# Patient Record
Sex: FEMALE | Race: WHITE | Employment: OTHER | URBAN - METROPOLITAN AREA
[De-identification: names, ages, dates, MRNs, and addresses within clinical notes are randomized per-mention and may not be internally consistent; named-entity substitution may affect disease eponyms.]

---

## 2017-04-10 ENCOUNTER — OFFICE VISIT (OUTPATIENT)
Dept: INTERNAL MEDICINE CLINIC | Age: 77
End: 2017-04-10

## 2017-04-10 VITALS
DIASTOLIC BLOOD PRESSURE: 69 MMHG | BODY MASS INDEX: 37.56 KG/M2 | WEIGHT: 220 LBS | OXYGEN SATURATION: 94 % | HEART RATE: 77 BPM | SYSTOLIC BLOOD PRESSURE: 132 MMHG | TEMPERATURE: 97.6 F | RESPIRATION RATE: 16 BRPM | HEIGHT: 64 IN

## 2017-04-10 DIAGNOSIS — R05.9 COUGH: ICD-10-CM

## 2017-04-10 DIAGNOSIS — J01.00 ACUTE NON-RECURRENT MAXILLARY SINUSITIS: Primary | ICD-10-CM

## 2017-04-10 PROCEDURE — 4040F PNEUMOC VAC/ADMIN/RCVD: CPT | Performed by: NURSE PRACTITIONER

## 2017-04-10 PROCEDURE — G8417 CALC BMI ABV UP PARAM F/U: HCPCS | Performed by: NURSE PRACTITIONER

## 2017-04-10 PROCEDURE — 1123F ACP DISCUSS/DSCN MKR DOCD: CPT | Performed by: NURSE PRACTITIONER

## 2017-04-10 PROCEDURE — 99213 OFFICE O/P EST LOW 20 MIN: CPT | Performed by: NURSE PRACTITIONER

## 2017-04-10 PROCEDURE — 1036F TOBACCO NON-USER: CPT | Performed by: NURSE PRACTITIONER

## 2017-04-10 PROCEDURE — G8427 DOCREV CUR MEDS BY ELIG CLIN: HCPCS | Performed by: NURSE PRACTITIONER

## 2017-04-10 PROCEDURE — 1090F PRES/ABSN URINE INCON ASSESS: CPT | Performed by: NURSE PRACTITIONER

## 2017-04-10 PROCEDURE — G8400 PT W/DXA NO RESULTS DOC: HCPCS | Performed by: NURSE PRACTITIONER

## 2017-04-10 RX ORDER — BENZONATATE 100 MG/1
100 CAPSULE ORAL 3 TIMES DAILY PRN
Qty: 24 CAPSULE | Refills: 1 | Status: SHIPPED | OUTPATIENT
Start: 2017-04-10 | End: 2017-04-18

## 2017-04-10 RX ORDER — AMOXICILLIN AND CLAVULANATE POTASSIUM 875; 125 MG/1; MG/1
1 TABLET, FILM COATED ORAL 2 TIMES DAILY
Qty: 20 TABLET | Refills: 0 | Status: SHIPPED | OUTPATIENT
Start: 2017-04-10 | End: 2017-04-20

## 2017-04-10 ASSESSMENT — ENCOUNTER SYMPTOMS
NAUSEA: 0
WHEEZING: 0
DIARRHEA: 0
COUGH: 1
SINUS PRESSURE: 1
VOMITING: 1

## 2017-04-24 ENCOUNTER — TELEPHONE (OUTPATIENT)
Dept: FAMILY MEDICINE CLINIC | Age: 77
End: 2017-04-24

## 2017-04-24 DIAGNOSIS — J32.9 CHRONIC SINUSITIS, UNSPECIFIED LOCATION: Primary | ICD-10-CM

## 2017-04-24 RX ORDER — TIZANIDINE 4 MG/1
4 TABLET ORAL EVERY 6 HOURS PRN
Qty: 20 TABLET | Refills: 0 | Status: SHIPPED | OUTPATIENT
Start: 2017-04-24 | End: 2017-05-02 | Stop reason: SDUPTHER

## 2017-04-24 RX ORDER — PREDNISONE 20 MG/1
20 TABLET ORAL DAILY
Qty: 5 TABLET | Refills: 0 | Status: SHIPPED | OUTPATIENT
Start: 2017-04-24 | End: 2017-04-29

## 2017-05-03 RX ORDER — TIZANIDINE 4 MG/1
TABLET ORAL
Qty: 20 TABLET | Refills: 0 | Status: SHIPPED | OUTPATIENT
Start: 2017-05-03 | End: 2017-05-05 | Stop reason: SDUPTHER

## 2017-05-06 RX ORDER — PROPRANOLOL HYDROCHLORIDE 120 MG/1
CAPSULE, EXTENDED RELEASE ORAL
Qty: 90 CAPSULE | Refills: 0 | Status: SHIPPED | OUTPATIENT
Start: 2017-05-06 | End: 2017-08-03 | Stop reason: SDUPTHER

## 2017-05-06 RX ORDER — LISINOPRIL AND HYDROCHLOROTHIAZIDE 12.5; 1 MG/1; MG/1
TABLET ORAL
Qty: 90 TABLET | Refills: 0 | Status: SHIPPED | OUTPATIENT
Start: 2017-05-06 | End: 2017-08-03 | Stop reason: SDUPTHER

## 2017-05-06 RX ORDER — TIZANIDINE 4 MG/1
TABLET ORAL
Qty: 20 TABLET | Refills: 0 | Status: SHIPPED | OUTPATIENT
Start: 2017-05-06 | End: 2017-05-08 | Stop reason: SDUPTHER

## 2017-05-08 RX ORDER — TIZANIDINE 4 MG/1
4 TABLET ORAL EVERY 6 HOURS PRN
Qty: 20 TABLET | Refills: 0 | Status: SHIPPED | OUTPATIENT
Start: 2017-05-08 | End: 2017-05-16 | Stop reason: SDUPTHER

## 2017-05-17 RX ORDER — TIZANIDINE 4 MG/1
TABLET ORAL
Qty: 20 TABLET | Refills: 0 | Status: SHIPPED | OUTPATIENT
Start: 2017-05-17 | End: 2017-08-22

## 2017-05-22 ENCOUNTER — OFFICE VISIT (OUTPATIENT)
Dept: FAMILY MEDICINE CLINIC | Age: 77
End: 2017-05-22

## 2017-05-22 ENCOUNTER — TELEPHONE (OUTPATIENT)
Dept: FAMILY MEDICINE CLINIC | Age: 77
End: 2017-05-22

## 2017-05-22 VITALS
RESPIRATION RATE: 18 BRPM | HEART RATE: 76 BPM | BODY MASS INDEX: 36.37 KG/M2 | SYSTOLIC BLOOD PRESSURE: 116 MMHG | TEMPERATURE: 97.7 F | DIASTOLIC BLOOD PRESSURE: 70 MMHG | WEIGHT: 213 LBS | HEIGHT: 64 IN

## 2017-05-22 DIAGNOSIS — Z00.00 MEDICARE ANNUAL WELLNESS VISIT, SUBSEQUENT: Primary | ICD-10-CM

## 2017-05-22 DIAGNOSIS — L98.9 SKIN LESION OF FACE: ICD-10-CM

## 2017-05-22 DIAGNOSIS — I48.0 PAROXYSMAL A-FIB (HCC): ICD-10-CM

## 2017-05-22 DIAGNOSIS — E78.2 MIXED HYPERLIPIDEMIA: ICD-10-CM

## 2017-05-22 DIAGNOSIS — R73.9 HYPERGLYCEMIA: ICD-10-CM

## 2017-05-22 DIAGNOSIS — K21.9 GASTROESOPHAGEAL REFLUX DISEASE WITHOUT ESOPHAGITIS: ICD-10-CM

## 2017-05-22 DIAGNOSIS — J30.89 PERENNIAL ALLERGIC RHINITIS, UNSPECIFIED ALLERGIC RHINITIS TRIGGER: ICD-10-CM

## 2017-05-22 DIAGNOSIS — G25.0 BENIGN ESSENTIAL TREMOR: ICD-10-CM

## 2017-05-22 DIAGNOSIS — Z23 NEED FOR PROPHYLACTIC VACCINATION AGAINST STREPTOCOCCUS PNEUMONIAE (PNEUMOCOCCUS): ICD-10-CM

## 2017-05-22 DIAGNOSIS — I10 ESSENTIAL HYPERTENSION: ICD-10-CM

## 2017-05-22 PROBLEM — C44.310 BCC (BASAL CELL CARCINOMA), FACE: Status: ACTIVE | Noted: 2017-05-22

## 2017-05-22 PROBLEM — C44.310 BCC (BASAL CELL CARCINOMA), FACE: Status: RESOLVED | Noted: 2017-05-22 | Resolved: 2017-05-22

## 2017-05-22 PROCEDURE — 90670 PCV13 VACCINE IM: CPT | Performed by: FAMILY MEDICINE

## 2017-05-22 PROCEDURE — G8417 CALC BMI ABV UP PARAM F/U: HCPCS | Performed by: FAMILY MEDICINE

## 2017-05-22 PROCEDURE — G8400 PT W/DXA NO RESULTS DOC: HCPCS | Performed by: FAMILY MEDICINE

## 2017-05-22 PROCEDURE — 1090F PRES/ABSN URINE INCON ASSESS: CPT | Performed by: FAMILY MEDICINE

## 2017-05-22 PROCEDURE — G0439 PPPS, SUBSEQ VISIT: HCPCS | Performed by: FAMILY MEDICINE

## 2017-05-22 PROCEDURE — G0009 ADMIN PNEUMOCOCCAL VACCINE: HCPCS | Performed by: FAMILY MEDICINE

## 2017-05-22 PROCEDURE — 1123F ACP DISCUSS/DSCN MKR DOCD: CPT | Performed by: FAMILY MEDICINE

## 2017-05-22 PROCEDURE — 4040F PNEUMOC VAC/ADMIN/RCVD: CPT | Performed by: FAMILY MEDICINE

## 2017-05-22 PROCEDURE — 99214 OFFICE O/P EST MOD 30 MIN: CPT | Performed by: FAMILY MEDICINE

## 2017-05-22 PROCEDURE — G8427 DOCREV CUR MEDS BY ELIG CLIN: HCPCS | Performed by: FAMILY MEDICINE

## 2017-05-22 PROCEDURE — 1036F TOBACCO NON-USER: CPT | Performed by: FAMILY MEDICINE

## 2017-05-22 RX ORDER — LORATADINE 10 MG/1
10 TABLET ORAL DAILY
Qty: 90 TABLET | Refills: 3 | Status: SHIPPED | OUTPATIENT
Start: 2017-05-22 | End: 2019-03-12

## 2017-05-22 RX ORDER — FLUTICASONE PROPIONATE 50 MCG
1 SPRAY, SUSPENSION (ML) NASAL DAILY
Qty: 1 BOTTLE | Refills: 5 | Status: SHIPPED | OUTPATIENT
Start: 2017-05-22 | End: 2021-09-16

## 2017-05-22 ASSESSMENT — LIFESTYLE VARIABLES
HOW OFTEN DURING THE LAST YEAR HAVE YOU NEEDED AN ALCOHOLIC DRINK FIRST THING IN THE MORNING TO GET YOURSELF GOING AFTER A NIGHT OF HEAVY DRINKING: 0
AUDIT TOTAL SCORE: 3
HOW OFTEN DO YOU HAVE A DRINK CONTAINING ALCOHOL: 2
HOW MANY STANDARD DRINKS CONTAINING ALCOHOL DO YOU HAVE ON A TYPICAL DAY: 0
HAS A RELATIVE, FRIEND, DOCTOR, OR ANOTHER HEALTH PROFESSIONAL EXPRESSED CONCERN ABOUT YOUR DRINKING OR SUGGESTED YOU CUT DOWN: 0
HOW OFTEN DURING THE LAST YEAR HAVE YOU BEEN UNABLE TO REMEMBER WHAT HAPPENED THE NIGHT BEFORE BECAUSE YOU HAD BEEN DRINKING: 0
HAVE YOU OR SOMEONE ELSE BEEN INJURED AS A RESULT OF YOUR DRINKING: 0
HOW OFTEN DURING THE LAST YEAR HAVE YOU FOUND THAT YOU WERE NOT ABLE TO STOP DRINKING ONCE YOU HAD STARTED: 0
HOW OFTEN DURING THE LAST YEAR HAVE YOU FAILED TO DO WHAT WAS NORMALLY EXPECTED FROM YOU BECAUSE OF DRINKING: 0
AUDIT-C TOTAL SCORE: 3
HOW OFTEN DO YOU HAVE SIX OR MORE DRINKS ON ONE OCCASION: 1
HOW OFTEN DURING THE LAST YEAR HAVE YOU HAD A FEELING OF GUILT OR REMORSE AFTER DRINKING: 0

## 2017-05-22 ASSESSMENT — PATIENT HEALTH QUESTIONNAIRE - PHQ9: SUM OF ALL RESPONSES TO PHQ QUESTIONS 1-9: 0

## 2017-05-22 ASSESSMENT — ANXIETY QUESTIONNAIRES: GAD7 TOTAL SCORE: 0

## 2017-05-23 DIAGNOSIS — R73.9 HYPERGLYCEMIA: ICD-10-CM

## 2017-05-23 DIAGNOSIS — E78.2 MIXED HYPERLIPIDEMIA: ICD-10-CM

## 2017-05-23 DIAGNOSIS — R73.03 PREDIABETES: ICD-10-CM

## 2017-05-23 DIAGNOSIS — I10 ESSENTIAL HYPERTENSION: ICD-10-CM

## 2017-05-24 ENCOUNTER — PATIENT MESSAGE (OUTPATIENT)
Dept: FAMILY MEDICINE CLINIC | Age: 77
End: 2017-05-24

## 2017-05-24 DIAGNOSIS — R42 VERTIGO: ICD-10-CM

## 2017-05-24 PROBLEM — R73.03 PREDIABETES: Status: ACTIVE | Noted: 2017-05-24

## 2017-05-24 LAB
A/G RATIO: 1.4 (ref 1.1–2.2)
ALBUMIN SERPL-MCNC: 3.9 G/DL (ref 3.4–5)
ALP BLD-CCNC: 91 U/L (ref 40–129)
ALT SERPL-CCNC: 14 U/L (ref 10–40)
ANION GAP SERPL CALCULATED.3IONS-SCNC: 17 MMOL/L (ref 3–16)
AST SERPL-CCNC: 15 U/L (ref 15–37)
BILIRUB SERPL-MCNC: <0.2 MG/DL (ref 0–1)
BUN BLDV-MCNC: 14 MG/DL (ref 7–20)
CALCIUM SERPL-MCNC: 9.1 MG/DL (ref 8.3–10.6)
CHLORIDE BLD-SCNC: 101 MMOL/L (ref 99–110)
CHOLESTEROL, TOTAL: 173 MG/DL (ref 0–199)
CO2: 24 MMOL/L (ref 21–32)
CREAT SERPL-MCNC: 0.7 MG/DL (ref 0.6–1.2)
ESTIMATED AVERAGE GLUCOSE: 119.8 MG/DL
GFR AFRICAN AMERICAN: >60
GFR NON-AFRICAN AMERICAN: >60
GLOBULIN: 2.8 G/DL
GLUCOSE BLD-MCNC: 112 MG/DL (ref 70–99)
HBA1C MFR BLD: 5.8 %
HDLC SERPL-MCNC: 38 MG/DL (ref 40–60)
LDL CHOLESTEROL CALCULATED: 96 MG/DL
POTASSIUM SERPL-SCNC: 4.6 MMOL/L (ref 3.5–5.1)
SODIUM BLD-SCNC: 142 MMOL/L (ref 136–145)
TOTAL PROTEIN: 6.7 G/DL (ref 6.4–8.2)
TRIGL SERPL-MCNC: 197 MG/DL (ref 0–150)
VLDLC SERPL CALC-MCNC: 39 MG/DL

## 2017-05-24 RX ORDER — MECLIZINE HYDROCHLORIDE 25 MG/1
25 TABLET ORAL 3 TIMES DAILY PRN
Qty: 30 TABLET | Refills: 1 | Status: SHIPPED | OUTPATIENT
Start: 2017-05-24 | End: 2019-05-30 | Stop reason: SDUPTHER

## 2017-05-31 ENCOUNTER — OFFICE VISIT (OUTPATIENT)
Dept: SURGERY | Age: 77
End: 2017-05-31

## 2017-05-31 DIAGNOSIS — D48.5 NEOPLASM OF UNCERTAIN BEHAVIOR OF SKIN: Primary | ICD-10-CM

## 2017-05-31 PROCEDURE — 1036F TOBACCO NON-USER: CPT | Performed by: DERMATOLOGY

## 2017-05-31 PROCEDURE — 1090F PRES/ABSN URINE INCON ASSESS: CPT | Performed by: DERMATOLOGY

## 2017-05-31 PROCEDURE — G8427 DOCREV CUR MEDS BY ELIG CLIN: HCPCS | Performed by: DERMATOLOGY

## 2017-05-31 PROCEDURE — 99202 OFFICE O/P NEW SF 15 MIN: CPT | Performed by: DERMATOLOGY

## 2017-05-31 PROCEDURE — 11101 PR BIOPSY, EACH ADDED LESION: CPT | Performed by: DERMATOLOGY

## 2017-05-31 PROCEDURE — 4040F PNEUMOC VAC/ADMIN/RCVD: CPT | Performed by: DERMATOLOGY

## 2017-05-31 PROCEDURE — G8400 PT W/DXA NO RESULTS DOC: HCPCS | Performed by: DERMATOLOGY

## 2017-05-31 PROCEDURE — 1123F ACP DISCUSS/DSCN MKR DOCD: CPT | Performed by: DERMATOLOGY

## 2017-05-31 PROCEDURE — G8417 CALC BMI ABV UP PARAM F/U: HCPCS | Performed by: DERMATOLOGY

## 2017-05-31 PROCEDURE — 11100 PR BIOPSY OF SKIN LESION: CPT | Performed by: DERMATOLOGY

## 2017-06-05 ENCOUNTER — TELEPHONE (OUTPATIENT)
Dept: SURGERY | Age: 77
End: 2017-06-05

## 2017-06-07 ENCOUNTER — PROCEDURE VISIT (OUTPATIENT)
Dept: SURGERY | Age: 77
End: 2017-06-07

## 2017-06-07 VITALS
DIASTOLIC BLOOD PRESSURE: 80 MMHG | BODY MASS INDEX: 37.25 KG/M2 | HEART RATE: 61 BPM | OXYGEN SATURATION: 93 % | TEMPERATURE: 97.4 F | SYSTOLIC BLOOD PRESSURE: 128 MMHG | WEIGHT: 217 LBS

## 2017-06-07 DIAGNOSIS — C44.311 BASAL CELL CARCINOMA OF RIGHT NASAL SIDEWALL: Primary | ICD-10-CM

## 2017-06-07 PROCEDURE — 13151 CMPLX RPR E/N/E/L 1.1-2.5 CM: CPT | Performed by: DERMATOLOGY

## 2017-06-07 PROCEDURE — 17311 MOHS 1 STAGE H/N/HF/G: CPT | Performed by: DERMATOLOGY

## 2017-06-07 PROCEDURE — 17312 MOHS ADDL STAGE: CPT | Performed by: DERMATOLOGY

## 2017-06-08 ENCOUNTER — TELEPHONE (OUTPATIENT)
Dept: SURGERY | Age: 77
End: 2017-06-08

## 2017-06-14 ENCOUNTER — PROCEDURE VISIT (OUTPATIENT)
Dept: SURGERY | Age: 77
End: 2017-06-14

## 2017-06-14 VITALS
HEART RATE: 74 BPM | TEMPERATURE: 97.5 F | WEIGHT: 213 LBS | BODY MASS INDEX: 36.56 KG/M2 | OXYGEN SATURATION: 96 % | SYSTOLIC BLOOD PRESSURE: 138 MMHG | DIASTOLIC BLOOD PRESSURE: 82 MMHG

## 2017-06-14 DIAGNOSIS — C44.319 BASAL CELL CARCINOMA OF RIGHT MEDIAL CHEEK: Primary | ICD-10-CM

## 2017-06-14 DIAGNOSIS — Z48.02 VISIT FOR SUTURE REMOVAL: ICD-10-CM

## 2017-06-14 PROCEDURE — 13132 CMPLX RPR F/C/C/M/N/AX/G/H/F: CPT | Performed by: DERMATOLOGY

## 2017-06-14 PROCEDURE — 17312 MOHS ADDL STAGE: CPT | Performed by: DERMATOLOGY

## 2017-06-14 PROCEDURE — 17311 MOHS 1 STAGE H/N/HF/G: CPT | Performed by: DERMATOLOGY

## 2017-06-15 ENCOUNTER — TELEPHONE (OUTPATIENT)
Dept: SURGERY | Age: 77
End: 2017-06-15

## 2017-08-03 RX ORDER — PROPRANOLOL HYDROCHLORIDE 120 MG/1
CAPSULE, EXTENDED RELEASE ORAL
Qty: 90 CAPSULE | Refills: 1 | Status: SHIPPED | OUTPATIENT
Start: 2017-08-03 | End: 2018-02-01 | Stop reason: SDUPTHER

## 2017-08-03 RX ORDER — LISINOPRIL AND HYDROCHLOROTHIAZIDE 12.5; 1 MG/1; MG/1
TABLET ORAL
Qty: 90 TABLET | Refills: 1 | Status: SHIPPED | OUTPATIENT
Start: 2017-08-03 | End: 2018-02-01 | Stop reason: SDUPTHER

## 2017-08-22 RX ORDER — TIZANIDINE 4 MG/1
TABLET ORAL
Qty: 20 TABLET | Refills: 0 | Status: SHIPPED | OUTPATIENT
Start: 2017-08-22 | End: 2018-05-14 | Stop reason: SDUPTHER

## 2017-09-26 RX ORDER — ATORVASTATIN CALCIUM 20 MG/1
TABLET, FILM COATED ORAL
Qty: 90 TABLET | Refills: 0 | Status: SHIPPED | OUTPATIENT
Start: 2017-09-26 | End: 2018-08-06

## 2017-11-22 ENCOUNTER — OFFICE VISIT (OUTPATIENT)
Dept: FAMILY MEDICINE CLINIC | Age: 77
End: 2017-11-22

## 2017-11-22 VITALS
BODY MASS INDEX: 38.27 KG/M2 | TEMPERATURE: 97.8 F | HEART RATE: 68 BPM | RESPIRATION RATE: 18 BRPM | DIASTOLIC BLOOD PRESSURE: 70 MMHG | WEIGHT: 216 LBS | HEIGHT: 63 IN | SYSTOLIC BLOOD PRESSURE: 124 MMHG

## 2017-11-22 DIAGNOSIS — N39.41 URGE INCONTINENCE: ICD-10-CM

## 2017-11-22 DIAGNOSIS — I25.10 CHRONIC CORONARY ARTERY DISEASE: ICD-10-CM

## 2017-11-22 DIAGNOSIS — R30.0 DYSURIA: ICD-10-CM

## 2017-11-22 DIAGNOSIS — Z23 NEEDS FLU SHOT: ICD-10-CM

## 2017-11-22 DIAGNOSIS — E78.2 MIXED HYPERLIPIDEMIA: ICD-10-CM

## 2017-11-22 DIAGNOSIS — I10 ESSENTIAL HYPERTENSION: Primary | ICD-10-CM

## 2017-11-22 DIAGNOSIS — N30.00 ACUTE CYSTITIS WITHOUT HEMATURIA: ICD-10-CM

## 2017-11-22 DIAGNOSIS — I48.0 PAROXYSMAL A-FIB (HCC): ICD-10-CM

## 2017-11-22 DIAGNOSIS — R73.03 PREDIABETES: ICD-10-CM

## 2017-11-22 LAB
BILIRUBIN, POC: NEGATIVE
BLOOD URINE, POC: ABNORMAL
CLARITY, POC: CLEAR
COLOR, POC: ABNORMAL
GLUCOSE URINE, POC: NEGATIVE
KETONES, POC: NEGATIVE
LEUKOCYTE EST, POC: ABNORMAL
NITRITE, POC: NEGATIVE
PH, POC: 5.5
PROTEIN, POC: ABNORMAL
SPECIFIC GRAVITY, POC: 1.02
UROBILINOGEN, POC: ABNORMAL

## 2017-11-22 PROCEDURE — 0509F URINE INCON PLAN DOCD: CPT | Performed by: FAMILY MEDICINE

## 2017-11-22 PROCEDURE — G8400 PT W/DXA NO RESULTS DOC: HCPCS | Performed by: FAMILY MEDICINE

## 2017-11-22 PROCEDURE — 1123F ACP DISCUSS/DSCN MKR DOCD: CPT | Performed by: FAMILY MEDICINE

## 2017-11-22 PROCEDURE — G0008 ADMIN INFLUENZA VIRUS VAC: HCPCS | Performed by: FAMILY MEDICINE

## 2017-11-22 PROCEDURE — G8484 FLU IMMUNIZE NO ADMIN: HCPCS | Performed by: FAMILY MEDICINE

## 2017-11-22 PROCEDURE — 99214 OFFICE O/P EST MOD 30 MIN: CPT | Performed by: FAMILY MEDICINE

## 2017-11-22 PROCEDURE — 90662 IIV NO PRSV INCREASED AG IM: CPT | Performed by: FAMILY MEDICINE

## 2017-11-22 PROCEDURE — 81002 URINALYSIS NONAUTO W/O SCOPE: CPT | Performed by: FAMILY MEDICINE

## 2017-11-22 PROCEDURE — 1036F TOBACCO NON-USER: CPT | Performed by: FAMILY MEDICINE

## 2017-11-22 PROCEDURE — 1090F PRES/ABSN URINE INCON ASSESS: CPT | Performed by: FAMILY MEDICINE

## 2017-11-22 PROCEDURE — G8427 DOCREV CUR MEDS BY ELIG CLIN: HCPCS | Performed by: FAMILY MEDICINE

## 2017-11-22 PROCEDURE — 4040F PNEUMOC VAC/ADMIN/RCVD: CPT | Performed by: FAMILY MEDICINE

## 2017-11-22 PROCEDURE — G8599 NO ASA/ANTIPLAT THER USE RNG: HCPCS | Performed by: FAMILY MEDICINE

## 2017-11-22 PROCEDURE — G8417 CALC BMI ABV UP PARAM F/U: HCPCS | Performed by: FAMILY MEDICINE

## 2017-11-22 RX ORDER — CIPROFLOXACIN 500 MG/1
500 TABLET, FILM COATED ORAL 2 TIMES DAILY
Qty: 20 TABLET | Refills: 0 | Status: SHIPPED | OUTPATIENT
Start: 2017-11-22 | End: 2017-12-02

## 2017-11-22 NOTE — PROGRESS NOTES
K 4.6 2017    CREATININE 0.7 2017     Lab Results   Component Value Date    WBC 9.3 2016    HGB 11.8 (L) 2016    HCT 35.8 (L) 2016    MCV 90.4 2016     2016     Lab Results   Component Value Date    ALT 14 2017    AST 15 2017    ALKPHOS 91 2017    BILITOT <0.2 2017     TSH (uIU/mL)   Date Value   2015 1.06     Lab Results   Component Value Date    LABA1C 5.8 2017     The 10-year ASCVD risk score (Lashon Prado, et al., 2013) is: 21.4%    Values used to calculate the score:      Age: 68 years      Sex: Female      Is Non- : No      Diabetic: No      Tobacco smoker: No      Systolic Blood Pressure: 680 mmHg      Is BP treated: Yes      HDL Cholesterol: 38 mg/dL      Total Cholesterol: 173 mg/dL  VISIT NOTE   Subjective:   HPI CHRONIC:   Chief Complaint   Patient presents with    Check-Up     6 month check up     Urinary Tract Infection    Patient here for follow-up of multiple chronic conditions includin. Essential hypertension    2. Needs flu shot    3. Prediabetes    4. Mixed hyperlipidemia    5. Paroxysmal a-fib (HCC)    6. Urge incontinence    7. Chronic coronary artery disease    8. Dysuria    9. Acute cystitis without hematuria      · Following appropriate diet? Yes  · Exercise: walking intermittently  · Taking medicines daily as directed? Yes  · Any side effects of medications?    No    ROS:  General ROS: negative for - chills, fatigue or fever  Hematological and Lymphatic ROS: negative for - blood clots or weight loss  Respiratory ROS: no cough, shortness of breath, or wheezing  Cardiovascular ROS: no chest pain or dyspnea on exertion  Gastrointestinal ROS: no abdominal pain, change in bowel habits, or black or bloody stools  Genito-Urinary ROS: no dysuria, trouble voiding, or hematuria  Neurological ROS: no TIA or stroke symptoms    HISTORY:  Patient's medications, allergies, past medical, surgical, social and family histories were reviewed and updated as appropriate (See above). Objective:   PHYSICAL EXAM   /70 (Site: Right Arm, Position: Sitting, Cuff Size: Large Adult)   Pulse 68   Temp 97.8 °F (36.6 °C) (Oral)   Resp 18   Ht 5' 3\" (1.6 m)   Wt 216 lb (98 kg)   BMI 38.26 kg/m²   Blood pressure is Excellent. BP Readings from Last 5 Encounters:   11/22/17 124/70   08/18/17 (!) 111/50   06/14/17 138/82   06/07/17 128/80   05/22/17 116/70     Weight is decreased. Wt Readings from Last 5 Encounters:   11/22/17 216 lb (98 kg)   08/17/17 224 lb 13.9 oz (102 kg)   06/14/17 213 lb (96.6 kg)   06/07/17 217 lb (98.4 kg)   05/22/17 213 lb (96.6 kg)      GENERAL:   · well-developed, well-nourished, obese, alert, no distress. EYES: glasses  · External findings: lids and lashes normal and conjunctivae and sclerae normal  LUNGS:    · Breathing unlabored  · clear to auscultation bilaterally and good air movement  · Palpation: Normal  CARDIOVASC:   · regular rate and rhythm, S1, S2 normal. No murmur, click, rub or gallop  · Apical impulse normal  · LEGS:  Lower extremity edema: none    SKIN: warm and dry  PSYCH:    · Alert and oriented  · Normal reasoning, insight good  · Facial expressions full, mood appropriate  · No memory disturbance noted     Assessment and Plan:     1. Essential hypertension     2. Needs flu shot  INFLUENZA, HIGH DOSE, 65 YRS +, IM, PF, PREFILL SYR, 0.5ML (FLUZONE HD)   3. Prediabetes     4. Mixed hyperlipidemia     5. Paroxysmal a-fib (HCC)     6. Urge incontinence     7. Chronic coronary artery disease     8. Dysuria  Urine Culture    POCT Urinalysis no Micro   9. Acute cystitis without hematuria  ciprofloxacin (CIPRO) 500 MG tablet     RISK FACTORS AND COUNSELLING  · Behavioral Risks- :\"None identified\". Counseling provided on the following healthy behaviors: N/A. INSTRUCTIONS  · NEXT APPOINTMENT: Please schedule check-up in 3 months.     · PLEASE TAKE THIS FORM TO CHECK-OUT WINDOW TO SCHEDULE NEXT VISIT.   · REFILL POLICY:  If not getting refills today, then PLEASE make next appointment on way out today. Will need to see that future appointment scheudled when pharmmcy contacts Dr. Velma Chatman for a refill.

## 2017-11-22 NOTE — TELEPHONE ENCOUNTER
The pharmacy is calling because they have many questions in regards to the pt current Rx. Please contact the pharmacy. [phone]      Thanks.

## 2017-11-22 NOTE — PATIENT INSTRUCTIONS
INSTRUCTIONS  · NEXT APPOINTMENT: Please schedule check-up in 3 months. · PLEASE TAKE THIS FORM TO CHECK-OUT WINDOW TO SCHEDULE NEXT VISIT.   · REFILL POLICY:  If not getting refills today, then PLEASE make next appointment on way out today. Will need to see that future appointment scheudled when pharmmcy contacts Dr. Velma Chatman for a refill.

## 2017-11-24 LAB
ORGANISM: ABNORMAL
URINE CULTURE, ROUTINE: ABNORMAL
URINE CULTURE, ROUTINE: ABNORMAL

## 2017-11-28 ENCOUNTER — OFFICE VISIT (OUTPATIENT)
Dept: DERMATOLOGY | Age: 77
End: 2017-11-28

## 2017-11-28 DIAGNOSIS — L71.9 ROSACEA: Primary | ICD-10-CM

## 2017-11-28 DIAGNOSIS — Z85.828 HISTORY OF BASAL CELL CARCINOMA: ICD-10-CM

## 2017-11-28 DIAGNOSIS — L82.1 SK (SEBORRHEIC KERATOSIS): ICD-10-CM

## 2017-11-28 DIAGNOSIS — L21.9 SEBORRHEIC DERMATITIS: ICD-10-CM

## 2017-11-28 PROCEDURE — G8484 FLU IMMUNIZE NO ADMIN: HCPCS | Performed by: DERMATOLOGY

## 2017-11-28 PROCEDURE — 99213 OFFICE O/P EST LOW 20 MIN: CPT | Performed by: DERMATOLOGY

## 2017-11-28 PROCEDURE — G8417 CALC BMI ABV UP PARAM F/U: HCPCS | Performed by: DERMATOLOGY

## 2017-11-28 PROCEDURE — G8599 NO ASA/ANTIPLAT THER USE RNG: HCPCS | Performed by: DERMATOLOGY

## 2017-11-28 PROCEDURE — 4040F PNEUMOC VAC/ADMIN/RCVD: CPT | Performed by: DERMATOLOGY

## 2017-11-28 PROCEDURE — G8427 DOCREV CUR MEDS BY ELIG CLIN: HCPCS | Performed by: DERMATOLOGY

## 2017-11-28 PROCEDURE — 1036F TOBACCO NON-USER: CPT | Performed by: DERMATOLOGY

## 2017-11-28 PROCEDURE — 1123F ACP DISCUSS/DSCN MKR DOCD: CPT | Performed by: DERMATOLOGY

## 2017-11-28 PROCEDURE — 1090F PRES/ABSN URINE INCON ASSESS: CPT | Performed by: DERMATOLOGY

## 2017-11-28 PROCEDURE — G8400 PT W/DXA NO RESULTS DOC: HCPCS | Performed by: DERMATOLOGY

## 2017-11-28 RX ORDER — FLUOCINONIDE TOPICAL SOLUTION USP, 0.05% 0.5 MG/ML
SOLUTION TOPICAL
Qty: 20 ML | Refills: 2 | Status: SHIPPED | OUTPATIENT
Start: 2017-11-28 | End: 2019-09-16

## 2017-11-28 NOTE — PROGRESS NOTES
[facility]  Pedro Ellison MD  [phone]      Laura Carter  Antionette Sharlene Radha  1940    68 y.o. female     Date of Visit: 11/28/2017    Chief Complaint: skin lesion on the nose    History of Present Illness:    1. She complains of a 1 month history of a persistent red lesions on the nose. 2.  She also complains of gradually accumulating lesions on the scalp, face and trunk. 3.  Follow-up for 2 recent BCCsdenies any signs of recurrence. Infiltrating BCC of the right medial cheektreated with Mohs by Dr. Gayathri Flores on 6/14/2017. Infiltrating BCC of the right nasal sidewalltreated with Mohs by Dr. Gayathri Flores on 6/7/17.    4.  She also complains of itching on the back of the scalp that comes and goes. Review of Systems:  Gen: Feels well, good sense of health. Skin: No new or changing moles, no history of keloids or hypertrophic scars. Heme: No abnormal bruising or bleeding. Past Medical History, Family History, Surgical History, Medications and Allergies reviewed.     Past Medical History:   Diagnosis Date    Allergic rhinitis     Benign essential tremor 1/5/2012    Cancer ([address]     skin cancer    Chronic back pain     Chronic sinusitis     Edema     Generalized osteoarthritis     GERD (gastroesophageal reflux disease)     Herpes zoster 2010    Hyperlipidemia     Hypertriglyceridemia, essential     Keloid scar     Nonspecific (abnormal) findings on radiological and other examination of skull and head 2010    neg bone scan    Osteoporosis     DEXA Scan: T -3.7 12/02; -0.5 10/05, 8/08 -0.9    Paroxysmal a-fib ([address] 2/2012    once    PONV (postoperative nausea and vomiting)     Rotator cuff tear- right     Screening for cardiovascular condition     4/26 mild diastolic dysfxn    Tinnitus     Urge incontinence     Vertigo      Past Surgical History:   Procedure Laterality Date    BACK SURGERY  1982    BACK SURGERY  2000    epidural    CHEST TUBE INSERTION  2014    Right VATS drainage of loculated effusion, decortication    HYSTERECTOMY  1983    both ovaries intact    KNEE ARTHROSCOPY  2003    Right    MOHS SURGERY      ROTATOR CUFF REPAIR  2011    right    TOTAL HIP ARTHROPLASTY  2012    left       Allergies   Allergen Reactions    Codeine Nausea And Vomiting    Myrbetriq [Mirabegron] Nausea And Vomiting    Vicodin [Hydrocodone-Acetaminophen] Nausea And Vomiting     Outpatient Prescriptions Marked as Taking for the 11/28/17 encounter (Office Visit) with Sarah Montana MD   Medication Sig Dispense Refill    fluocinonide (LIDEX) 0.05 % external solution Apply sparingly to scalp daily if needed for itching. 20 mL 2    ciprofloxacin (CIPRO) 500 MG tablet Take 1 tablet by mouth 2 times daily for 10 days 20 tablet 0    atorvastatin (LIPITOR) 20 MG tablet TAKE 1 TABLET BY MOUTH EVERY EVENING 90 tablet 0    tiZANidine (ZANAFLEX) 4 MG tablet TAKE 1 TABLET BY MOUTH EVERY 6 HOURS AS NEEDED FOR MUSCLES 20 tablet 0    lisinopril-hydrochlorothiazide (PRINZIDE;ZESTORETIC) 10-12.5 MG per tablet TAKE 1 TABLET BY MOUTH EVERY DAY 90 tablet 1    propranolol (INDERAL LA) 120 MG extended release capsule TAKE ONE CAPSULE BY MOUTH EVERY DAY 90 capsule 1    meclizine (ANTIVERT) 25 MG tablet Take 1 tablet by mouth 3 times daily as needed for Dizziness or Nausea 30 tablet 1    loratadine (CLARITIN) 10 MG tablet Take 1 tablet by mouth daily 90 tablet 3    fluticasone (FLONASE) 50 MCG/ACT nasal spray 1 spray by Nasal route daily 1 Bottle 5    BIOTIN PO Take by mouth      celecoxib (CELEBREX) 200 MG capsule Take 1 capsule by mouth daily 90 capsule 0    aspirin EC 81 MG EC tablet Take 1 tablet by mouth daily. 30 tablet 11       Physical Examination       The following were examined and determined to be normal: Psych/Neuro, Head/face, Conjunctivae/eyelids, Gums/teeth/lips, Neck, Breast/axilla/chest, Abdomen, Back, RUE, LUE, RLE, LLE and Nails/digits.     The following were examined and determined to be abnormal: Scalp/hair. Well appearing. 1.  Nose and cheeks with multiple telangiectasias. 2.  Forehead, scalp and trunk - stuck-on appearing tan-brown verrucous papules and plaques. 3.  Clear. 4.  Lower lateral portions of the occipital scalp with ill-defined scaly erythematous patches. Assessment and Plan     1. Rosacea - erythematotelangiectatic     Reassurance. 2. SK (seborrheic keratosis) - multiple    Reassurance. 3. History of basal cell carcinomas - clear today    Encouraged sun protective behaviors. Call for any concerning lesions. 4. Seborrheic dermatitis of the scalp - mild and focal today    Lidex solution daily until improved and then as needed for recurrence. Return in about 6 months (around 5/28/2018).

## 2017-12-07 RX ORDER — DEXTROMETHORPHAN HYDROBROMIDE AND PROMETHAZINE HYDROCHLORIDE 15; 6.25 MG/5ML; MG/5ML
5 SYRUP ORAL 4 TIMES DAILY PRN
Qty: 240 ML | Refills: 0 | Status: SHIPPED | OUTPATIENT
Start: 2017-12-07 | End: 2017-12-14

## 2018-02-01 RX ORDER — PROPRANOLOL HYDROCHLORIDE 120 MG/1
CAPSULE, EXTENDED RELEASE ORAL
Qty: 90 CAPSULE | Refills: 1 | Status: SHIPPED | OUTPATIENT
Start: 2018-02-01 | End: 2018-08-03 | Stop reason: SDUPTHER

## 2018-02-01 RX ORDER — LISINOPRIL AND HYDROCHLOROTHIAZIDE 12.5; 1 MG/1; MG/1
TABLET ORAL
Qty: 90 TABLET | Refills: 1 | Status: SHIPPED | OUTPATIENT
Start: 2018-02-01 | End: 2018-08-03 | Stop reason: SDUPTHER

## 2018-03-26 ENCOUNTER — OFFICE VISIT (OUTPATIENT)
Dept: FAMILY MEDICINE CLINIC | Age: 78
End: 2018-03-26

## 2018-03-26 VITALS
HEART RATE: 58 BPM | RESPIRATION RATE: 20 BRPM | OXYGEN SATURATION: 93 % | BODY MASS INDEX: 38.27 KG/M2 | HEIGHT: 63 IN | SYSTOLIC BLOOD PRESSURE: 126 MMHG | WEIGHT: 216 LBS | TEMPERATURE: 97.9 F | DIASTOLIC BLOOD PRESSURE: 80 MMHG

## 2018-03-26 DIAGNOSIS — J20.9 ACUTE BRONCHITIS, UNSPECIFIED ORGANISM: Primary | ICD-10-CM

## 2018-03-26 PROCEDURE — G8417 CALC BMI ABV UP PARAM F/U: HCPCS | Performed by: FAMILY MEDICINE

## 2018-03-26 PROCEDURE — G8482 FLU IMMUNIZE ORDER/ADMIN: HCPCS | Performed by: FAMILY MEDICINE

## 2018-03-26 PROCEDURE — 4040F PNEUMOC VAC/ADMIN/RCVD: CPT | Performed by: FAMILY MEDICINE

## 2018-03-26 PROCEDURE — 1123F ACP DISCUSS/DSCN MKR DOCD: CPT | Performed by: FAMILY MEDICINE

## 2018-03-26 PROCEDURE — 1036F TOBACCO NON-USER: CPT | Performed by: FAMILY MEDICINE

## 2018-03-26 PROCEDURE — G8400 PT W/DXA NO RESULTS DOC: HCPCS | Performed by: FAMILY MEDICINE

## 2018-03-26 PROCEDURE — G8599 NO ASA/ANTIPLAT THER USE RNG: HCPCS | Performed by: FAMILY MEDICINE

## 2018-03-26 PROCEDURE — G8427 DOCREV CUR MEDS BY ELIG CLIN: HCPCS | Performed by: FAMILY MEDICINE

## 2018-03-26 PROCEDURE — 99214 OFFICE O/P EST MOD 30 MIN: CPT | Performed by: FAMILY MEDICINE

## 2018-03-26 PROCEDURE — 1090F PRES/ABSN URINE INCON ASSESS: CPT | Performed by: FAMILY MEDICINE

## 2018-03-26 RX ORDER — AZITHROMYCIN 250 MG/1
TABLET, FILM COATED ORAL
Qty: 1 PACKET | Refills: 0 | Status: SHIPPED | OUTPATIENT
Start: 2018-03-26 | End: 2018-03-31

## 2018-03-26 RX ORDER — DEXTROMETHORPHAN HYDROBROMIDE AND PROMETHAZINE HYDROCHLORIDE 15; 6.25 MG/5ML; MG/5ML
5 SYRUP ORAL 4 TIMES DAILY PRN
Qty: 240 ML | Refills: 0 | Status: SHIPPED | OUTPATIENT
Start: 2018-03-26 | End: 2018-04-02

## 2018-03-26 RX ORDER — PREDNISONE 20 MG/1
20 TABLET ORAL 2 TIMES DAILY
Qty: 10 TABLET | Refills: 0 | Status: SHIPPED | OUTPATIENT
Start: 2018-03-26 | End: 2018-03-31

## 2018-03-26 NOTE — PROGRESS NOTES
Reactions    Codeine Nausea And Vomiting    Myrbetriq [Mirabegron] Nausea And Vomiting    Vicodin [Hydrocodone-Acetaminophen] Nausea And Vomiting     Past Medical History:   Diagnosis Date    Allergic rhinitis     Benign essential tremor 1/5/2012    Cancer ([facility]     skin cancer    Chronic back pain     Chronic sinusitis     Edema     Generalized osteoarthritis     GERD (gastroesophageal reflux disease)     Herpes zoster 2010    Hyperlipidemia     Hypertriglyceridemia, essential     Keloid scar     Nonspecific (abnormal) findings on radiological and other examination of skull and head 2010    neg bone scan    Osteoporosis     DEXA Scan: T -3.7 12/02; -0.5 10/05, 8/08 -0.9    Paroxysmal a-fib ([facility] 2/2012    once    PONV (postoperative nausea and vomiting)     Rotator cuff tear- right     Screening for cardiovascular condition     3/60 mild diastolic dysfxn    Tinnitus     Urge incontinence     Vertigo      Past Surgical History:   Procedure Laterality Date    BACK SURGERY  1982    BACK SURGERY  2000    epidural    CHEST TUBE INSERTION  2014    Right VATS drainage of loculated effusion, decortication    HYSTERECTOMY  1983    both ovaries intact    KNEE ARTHROSCOPY  2003    Right    MOHS SURGERY      ROTATOR CUFF REPAIR  2011    right    TOTAL HIP ARTHROPLASTY  2012    left     Social History     Social History    Marital status:      Spouse name: Edison Mi Number of children: 3    Years of education: N/A     Occupational History          Social History Main Topics    Smoking status: Former Smoker     Quit date: 9/1/2014    Smokeless tobacco: Never Used      Comment: Advised not to resume    Alcohol use Yes      Comment: socially    Drug use: No    Sexual activity: Not on file     Other Topics Concern    Not on file     Social History Narrative    Self-breast exams: yes. Lives with spouse. Exercise: stretching daily. Seatbelt use: Always.   Living will: no, additional information provided     Family History   Problem Relation Age of Onset    Diabetes Mother     Stroke Mother     Coronary Art Dis Mother     Coronary Art Dis Father     Diabetes Father     Breast Cancer Sister     Hypertension Sister     Hypertension Brother     Prostate Cancer Brother      Health Maintenance   Topic Date Due    Shingles Vaccine (1 of 2 - 2 Dose Series) 12/24/1990    Potassium monitoring  05/23/2018    Creatinine monitoring  05/23/2018    Lipid screen  05/23/2022    DTaP/Tdap/Td vaccine (3 - Td) 01/28/2025    DEXA (modify frequency per FRAX score)  Completed    Flu vaccine  Completed    Pneumococcal low/med risk  Completed       Objective:   PHYSICAL EXAM  /80 (Site: Right Arm, Position: Sitting, Cuff Size: Large Adult)   Pulse 58   Temp 97.9 °F (36.6 °C) (Oral)   Resp 20   Ht 5' 3\" (1.6 m)   Wt 216 lb (98 kg)   SpO2 93%   BMI 38.26 kg/m²   Wt Readings from Last 3 Encounters:   03/26/18 216 lb (98 kg)   11/22/17 216 lb (98 kg)   08/17/17 224 lb 13.9 oz (102 kg)     BP Readings from Last 3 Encounters:   03/26/18 126/80   11/22/17 124/70   08/18/17 (!) 111/50     GENERAL: well-developed, well-nourished, alert, no distress  EYES: negative findings: lids and lashes normal and conjunctivae and sclerae normal  ENT: normal TM's and external ear canals both ears  · External nose and ears appear normal  · Pharynx: red, inflamed. Exudates: None  · Lips, mucosa, and tongue normal and teeth normal  · Hearing grossly normal  NECK: Supple, symmetrical, trachea midline  · Thyroid not enlarged, symmetric, no tenderness/mass/nodules  · no cervical nodes, no supraclavicular nodes  LUNGS:  Breathing unlabored  · clear to auscultation bilaterally,  good air movement  CARDIOVASC: regular rate and rhythm    Assessment/Plan:     1.  Acute bronchitis, unspecified organism  promethazine-dextromethorphan (PROMETHAZINE-DM) 6.25-15 MG/5ML syrup    azithromycin (ZITHROMAX Z-LION) 250 MG tablet    predniSONE (DELTASONE) 20 MG tablet   Please finish taking ALL of the antibiotics and prednisone. May take Mucinex (guaifenisen) and Robitussin DM (guafenisen, dextromethorphan) for cough and congestion. May also try Vicks Vaporub. May take acetaminophen (Tylenol) as instructed on packaging. Please call if symptoms getting worse.

## 2018-04-16 ENCOUNTER — TELEPHONE (OUTPATIENT)
Dept: FAMILY MEDICINE CLINIC | Age: 78
End: 2018-04-16

## 2018-05-07 ENCOUNTER — TELEPHONE (OUTPATIENT)
Dept: FAMILY MEDICINE CLINIC | Age: 78
End: 2018-05-07

## 2018-05-08 ENCOUNTER — TELEPHONE (OUTPATIENT)
Dept: FAMILY MEDICINE CLINIC | Age: 78
End: 2018-05-08

## 2018-05-08 ENCOUNTER — OFFICE VISIT (OUTPATIENT)
Dept: FAMILY MEDICINE CLINIC | Age: 78
End: 2018-05-08

## 2018-05-08 ENCOUNTER — PATIENT MESSAGE (OUTPATIENT)
Dept: FAMILY MEDICINE CLINIC | Age: 78
End: 2018-05-08

## 2018-05-08 VITALS
HEART RATE: 66 BPM | RESPIRATION RATE: 20 BRPM | TEMPERATURE: 98.7 F | OXYGEN SATURATION: 97 % | BODY MASS INDEX: 38.45 KG/M2 | DIASTOLIC BLOOD PRESSURE: 68 MMHG | WEIGHT: 217 LBS | HEIGHT: 63 IN | SYSTOLIC BLOOD PRESSURE: 110 MMHG

## 2018-05-08 DIAGNOSIS — I10 ESSENTIAL HYPERTENSION: ICD-10-CM

## 2018-05-08 DIAGNOSIS — I48.0 PAROXYSMAL A-FIB (HCC): Primary | ICD-10-CM

## 2018-05-08 DIAGNOSIS — D64.9 ANEMIA, UNSPECIFIED TYPE: Primary | ICD-10-CM

## 2018-05-08 DIAGNOSIS — N39.41 URGE INCONTINENCE: ICD-10-CM

## 2018-05-08 PROCEDURE — 1090F PRES/ABSN URINE INCON ASSESS: CPT | Performed by: FAMILY MEDICINE

## 2018-05-08 PROCEDURE — 1036F TOBACCO NON-USER: CPT | Performed by: FAMILY MEDICINE

## 2018-05-08 PROCEDURE — G8417 CALC BMI ABV UP PARAM F/U: HCPCS | Performed by: FAMILY MEDICINE

## 2018-05-08 PROCEDURE — 4040F PNEUMOC VAC/ADMIN/RCVD: CPT | Performed by: FAMILY MEDICINE

## 2018-05-08 PROCEDURE — G8427 DOCREV CUR MEDS BY ELIG CLIN: HCPCS | Performed by: FAMILY MEDICINE

## 2018-05-08 PROCEDURE — 0509F URINE INCON PLAN DOCD: CPT | Performed by: FAMILY MEDICINE

## 2018-05-08 PROCEDURE — 99214 OFFICE O/P EST MOD 30 MIN: CPT | Performed by: FAMILY MEDICINE

## 2018-05-08 PROCEDURE — G8598 ASA/ANTIPLAT THER USED: HCPCS | Performed by: FAMILY MEDICINE

## 2018-05-08 PROCEDURE — 93000 ELECTROCARDIOGRAM COMPLETE: CPT | Performed by: FAMILY MEDICINE

## 2018-05-08 PROCEDURE — G8400 PT W/DXA NO RESULTS DOC: HCPCS | Performed by: FAMILY MEDICINE

## 2018-05-08 PROCEDURE — 1123F ACP DISCUSS/DSCN MKR DOCD: CPT | Performed by: FAMILY MEDICINE

## 2018-05-08 RX ORDER — MELATONIN 10 MG
1 CAPSULE ORAL
COMMUNITY
End: 2019-03-12

## 2018-05-08 RX ORDER — TOLTERODINE 4 MG/1
4 CAPSULE, EXTENDED RELEASE ORAL DAILY
Qty: 90 CAPSULE | Refills: 3 | Status: SHIPPED | OUTPATIENT
Start: 2018-05-08 | End: 2019-03-12

## 2018-05-08 RX ORDER — TRAMADOL HYDROCHLORIDE 50 MG/1
50-100 TABLET ORAL
COMMUNITY
Start: 2012-12-12 | End: 2019-09-16

## 2018-05-08 RX ORDER — TOLTERODINE 4 MG/1
4 CAPSULE, EXTENDED RELEASE ORAL DAILY
Qty: 30 CAPSULE | Refills: 3 | Status: SHIPPED | OUTPATIENT
Start: 2018-05-08 | End: 2018-05-08 | Stop reason: SDUPTHER

## 2018-05-09 PROBLEM — D64.9 ANEMIA: Status: ACTIVE | Noted: 2018-05-09

## 2018-05-09 RX ORDER — FERROUS SULFATE 325(65) MG
325 TABLET ORAL
Qty: 90 TABLET | Refills: 3 | Status: SHIPPED | OUTPATIENT
Start: 2018-05-09 | End: 2018-09-12

## 2018-05-10 DIAGNOSIS — D64.9 ANEMIA, UNSPECIFIED TYPE: ICD-10-CM

## 2018-05-10 LAB
FERRITIN: 49.6 NG/ML (ref 15–150)
FOLATE: 9.98 NG/ML (ref 4.78–24.2)
HCT VFR BLD CALC: 32.9 % (ref 36–48)
HEMOGLOBIN: 11.1 G/DL (ref 12–16)
IRON SATURATION: 16 % (ref 15–50)
IRON: 47 UG/DL (ref 37–145)
TOTAL IRON BINDING CAPACITY: 290 UG/DL (ref 260–445)
VITAMIN B-12: 437 PG/ML (ref 211–911)

## 2018-05-14 RX ORDER — TIZANIDINE 4 MG/1
4 TABLET ORAL EVERY 6 HOURS PRN
Qty: 20 TABLET | Refills: 0 | Status: SHIPPED | OUTPATIENT
Start: 2018-05-14 | End: 2018-05-14 | Stop reason: SDUPTHER

## 2018-05-15 ENCOUNTER — TELEPHONE (OUTPATIENT)
Dept: FAMILY MEDICINE CLINIC | Age: 78
End: 2018-05-15

## 2018-05-15 PROCEDURE — 93228 REMOTE 30 DAY ECG REV/REPORT: CPT | Performed by: INTERNAL MEDICINE

## 2018-05-15 RX ORDER — TIZANIDINE 4 MG/1
TABLET ORAL
Qty: 360 TABLET | Refills: 0 | Status: SHIPPED | OUTPATIENT
Start: 2018-05-15 | End: 2018-05-22

## 2018-05-16 DIAGNOSIS — I48.0 PAROXYSMAL A-FIB (HCC): ICD-10-CM

## 2018-05-22 ENCOUNTER — PATIENT MESSAGE (OUTPATIENT)
Dept: FAMILY MEDICINE CLINIC | Age: 78
End: 2018-05-22

## 2018-05-22 RX ORDER — TIZANIDINE 4 MG/1
TABLET ORAL
Qty: 360 TABLET | Refills: 1 | Status: SHIPPED | OUTPATIENT
Start: 2018-05-22 | End: 2018-12-22 | Stop reason: SDUPTHER

## 2018-06-12 ENCOUNTER — OFFICE VISIT (OUTPATIENT)
Dept: FAMILY MEDICINE CLINIC | Age: 78
End: 2018-06-12

## 2018-06-12 VITALS
HEIGHT: 63 IN | RESPIRATION RATE: 18 BRPM | TEMPERATURE: 98 F | BODY MASS INDEX: 38.41 KG/M2 | WEIGHT: 216.8 LBS | DIASTOLIC BLOOD PRESSURE: 78 MMHG | OXYGEN SATURATION: 98 % | SYSTOLIC BLOOD PRESSURE: 122 MMHG | HEART RATE: 68 BPM

## 2018-06-12 DIAGNOSIS — I10 ESSENTIAL HYPERTENSION: ICD-10-CM

## 2018-06-12 DIAGNOSIS — Z96.642 HISTORY OF TOTAL LEFT HIP REPLACEMENT: ICD-10-CM

## 2018-06-12 DIAGNOSIS — I25.10 CHRONIC CORONARY ARTERY DISEASE: ICD-10-CM

## 2018-06-12 DIAGNOSIS — M17.11 PRIMARY OSTEOARTHRITIS OF RIGHT KNEE: Primary | ICD-10-CM

## 2018-06-12 DIAGNOSIS — R73.03 PREDIABETES: ICD-10-CM

## 2018-06-12 DIAGNOSIS — I48.0 PAROXYSMAL A-FIB (HCC): ICD-10-CM

## 2018-06-12 DIAGNOSIS — Z01.810 PREOP CARDIOVASCULAR EXAM: ICD-10-CM

## 2018-06-12 DIAGNOSIS — J45.20 MILD INTERMITTENT ASTHMATIC BRONCHITIS WITHOUT COMPLICATION: ICD-10-CM

## 2018-06-12 DIAGNOSIS — G47.33 OBSTRUCTIVE APNEA: ICD-10-CM

## 2018-06-12 PROBLEM — Z48.02 VISIT FOR SUTURE REMOVAL: Status: RESOLVED | Noted: 2017-06-14 | Resolved: 2018-06-12

## 2018-06-12 PROBLEM — L98.9 SKIN LESION OF FACE: Status: RESOLVED | Noted: 2017-05-22 | Resolved: 2018-06-12

## 2018-06-12 PROBLEM — D48.5 NEOPLASM OF UNCERTAIN BEHAVIOR OF SKIN: Status: RESOLVED | Noted: 2017-05-31 | Resolved: 2018-06-12

## 2018-06-12 PROCEDURE — G8598 ASA/ANTIPLAT THER USED: HCPCS | Performed by: FAMILY MEDICINE

## 2018-06-12 PROCEDURE — 1090F PRES/ABSN URINE INCON ASSESS: CPT | Performed by: FAMILY MEDICINE

## 2018-06-12 PROCEDURE — G8427 DOCREV CUR MEDS BY ELIG CLIN: HCPCS | Performed by: FAMILY MEDICINE

## 2018-06-12 PROCEDURE — 1123F ACP DISCUSS/DSCN MKR DOCD: CPT | Performed by: FAMILY MEDICINE

## 2018-06-12 PROCEDURE — 4040F PNEUMOC VAC/ADMIN/RCVD: CPT | Performed by: FAMILY MEDICINE

## 2018-06-12 PROCEDURE — G8417 CALC BMI ABV UP PARAM F/U: HCPCS | Performed by: FAMILY MEDICINE

## 2018-06-12 PROCEDURE — G8400 PT W/DXA NO RESULTS DOC: HCPCS | Performed by: FAMILY MEDICINE

## 2018-06-12 PROCEDURE — 99214 OFFICE O/P EST MOD 30 MIN: CPT | Performed by: FAMILY MEDICINE

## 2018-06-12 PROCEDURE — 1036F TOBACCO NON-USER: CPT | Performed by: FAMILY MEDICINE

## 2018-07-31 DIAGNOSIS — I48.0 PAROXYSMAL A-FIB (HCC): ICD-10-CM

## 2018-08-01 RX ORDER — RIVAROXABAN 20 MG/1
TABLET, FILM COATED ORAL
Qty: 30 TABLET | Refills: 5 | Status: SHIPPED | OUTPATIENT
Start: 2018-08-01 | End: 2019-04-23 | Stop reason: SDUPTHER

## 2018-08-03 RX ORDER — PROPRANOLOL HYDROCHLORIDE 120 MG/1
CAPSULE, EXTENDED RELEASE ORAL
Qty: 90 CAPSULE | Refills: 0 | Status: SHIPPED | OUTPATIENT
Start: 2018-08-03 | End: 2018-11-01 | Stop reason: SDUPTHER

## 2018-08-03 RX ORDER — LISINOPRIL AND HYDROCHLOROTHIAZIDE 12.5; 1 MG/1; MG/1
TABLET ORAL
Qty: 90 TABLET | Refills: 0 | Status: SHIPPED | OUTPATIENT
Start: 2018-08-03 | End: 2018-11-01 | Stop reason: SDUPTHER

## 2018-08-06 RX ORDER — ATORVASTATIN CALCIUM 20 MG/1
TABLET, FILM COATED ORAL
Qty: 90 TABLET | Refills: 0 | Status: SHIPPED | OUTPATIENT
Start: 2018-08-06 | End: 2018-11-08 | Stop reason: SDUPTHER

## 2018-08-14 ENCOUNTER — OFFICE VISIT (OUTPATIENT)
Dept: DERMATOLOGY | Age: 78
End: 2018-08-14

## 2018-08-14 DIAGNOSIS — L71.9 ROSACEA: ICD-10-CM

## 2018-08-14 DIAGNOSIS — Z85.828 HISTORY OF BASAL CELL CARCINOMA: ICD-10-CM

## 2018-08-14 DIAGNOSIS — L21.9 SEBORRHEIC DERMATITIS: Primary | ICD-10-CM

## 2018-08-14 PROCEDURE — G8417 CALC BMI ABV UP PARAM F/U: HCPCS | Performed by: DERMATOLOGY

## 2018-08-14 PROCEDURE — 4040F PNEUMOC VAC/ADMIN/RCVD: CPT | Performed by: DERMATOLOGY

## 2018-08-14 PROCEDURE — G8400 PT W/DXA NO RESULTS DOC: HCPCS | Performed by: DERMATOLOGY

## 2018-08-14 PROCEDURE — 1090F PRES/ABSN URINE INCON ASSESS: CPT | Performed by: DERMATOLOGY

## 2018-08-14 PROCEDURE — 99213 OFFICE O/P EST LOW 20 MIN: CPT | Performed by: DERMATOLOGY

## 2018-08-14 PROCEDURE — 1101F PT FALLS ASSESS-DOCD LE1/YR: CPT | Performed by: DERMATOLOGY

## 2018-08-14 PROCEDURE — 1036F TOBACCO NON-USER: CPT | Performed by: DERMATOLOGY

## 2018-08-14 PROCEDURE — G8427 DOCREV CUR MEDS BY ELIG CLIN: HCPCS | Performed by: DERMATOLOGY

## 2018-08-14 PROCEDURE — G8598 ASA/ANTIPLAT THER USED: HCPCS | Performed by: DERMATOLOGY

## 2018-08-14 PROCEDURE — 1123F ACP DISCUSS/DSCN MKR DOCD: CPT | Performed by: DERMATOLOGY

## 2018-08-14 NOTE — PROGRESS NOTES
REPAIR  2011    right    TOTAL HIP ARTHROPLASTY  2012    left       Allergies   Allergen Reactions    Codeine Nausea And Vomiting    Myrbetriq [Mirabegron] Nausea And Vomiting    Vicodin [Hydrocodone-Acetaminophen] Nausea And Vomiting     Outpatient Prescriptions Marked as Taking for the 8/14/18 encounter (Office Visit) with Regis Briscoe MD   Medication Sig Dispense Refill    atorvastatin (LIPITOR) 20 MG tablet TAKE 1 TABLET BY MOUTH EVERY EVENING 90 tablet 0    propranolol (INDERAL LA) 120 MG extended release capsule TAKE ONE CAPSULE BY MOUTH EVERY DAY 90 capsule 0    lisinopril-hydrochlorothiazide (PRINZIDE;ZESTORETIC) 10-12.5 MG per tablet TAKE 1 TABLET BY MOUTH EVERY DAY 90 tablet 0    XARELTO 20 MG TABS tablet TAKE 1 TABLET BY MOUTH DAILY WITH BREAKFAST 30 tablet 5    tiZANidine (ZANAFLEX) 4 MG tablet TAKE 1 TABLET BY MOUTH EVERY 6 HOURS AS NEEDED( MUSCLES) 360 tablet 1    ferrous sulfate 325 (65 Fe) MG tablet Take 1 tablet by mouth daily (with breakfast) 90 tablet 3    traMADol (ULTRAM) 50 MG tablet Take  mg by mouth. Orval Opitz Melatonin 10 MG CAPS Take 1 capsule by mouth      tolterodine (DETROL LA) 4 MG extended release capsule TAKE 1 CAPSULE BY MOUTH DAILY 90 capsule 3    BIOTIN PO Take by mouth         Physical Examination       The following were examined and determined to be normal: Psych/Neuro, Scalp/hair, Conjunctivae/eyelids, Gums/teeth/lips, Neck, Back, RUE, LUE, RLE and LLE. The following were examined and determined to be abnormal: Head/face. Well appearing. 1.  Scalp clear today. 2.  Nose and medial cheeks - mild erythema and telangiectasias. 3.  Clear. Assessment and Plan     1. Seborrheic dermatitis of the scalp - under good control    Continue intermittent use of Lidex solution for recurrences. 2. Rosacea - erythematotelangiectatic, stable    Observe.       3. History of basal cell carcinomas - clear    Sun protective behaviors and self skin

## 2018-08-24 DIAGNOSIS — R04.0 EPISTAXIS: Primary | ICD-10-CM

## 2018-08-24 NOTE — PROGRESS NOTES
Subjective:      Patient ID: Gab Gray is a 68 y.o. female.     HPI    Review of Systems    Objective:   Physical Exam    Assessment:      ***      Plan:      ***        Shaan Rogers MD

## 2018-09-12 ENCOUNTER — OFFICE VISIT (OUTPATIENT)
Dept: FAMILY MEDICINE CLINIC | Age: 78
End: 2018-09-12

## 2018-09-12 VITALS
SYSTOLIC BLOOD PRESSURE: 130 MMHG | WEIGHT: 214 LBS | HEART RATE: 76 BPM | BODY MASS INDEX: 37.92 KG/M2 | HEIGHT: 63 IN | TEMPERATURE: 97.8 F | RESPIRATION RATE: 18 BRPM | DIASTOLIC BLOOD PRESSURE: 70 MMHG

## 2018-09-12 DIAGNOSIS — D64.9 ANEMIA, UNSPECIFIED TYPE: ICD-10-CM

## 2018-09-12 DIAGNOSIS — K21.9 GASTROESOPHAGEAL REFLUX DISEASE WITHOUT ESOPHAGITIS: ICD-10-CM

## 2018-09-12 DIAGNOSIS — Z23 NEED FOR VACCINATION FOR ZOSTER: ICD-10-CM

## 2018-09-12 DIAGNOSIS — Z96.651 HISTORY OF KNEE REPLACEMENT, TOTAL, RIGHT: ICD-10-CM

## 2018-09-12 DIAGNOSIS — R73.03 PREDIABETES: ICD-10-CM

## 2018-09-12 DIAGNOSIS — I10 ESSENTIAL HYPERTENSION: ICD-10-CM

## 2018-09-12 DIAGNOSIS — M15.9 GENERALIZED OSTEOARTHRITIS: ICD-10-CM

## 2018-09-12 DIAGNOSIS — Z00.00 MEDICARE ANNUAL WELLNESS VISIT, SUBSEQUENT: Primary | ICD-10-CM

## 2018-09-12 DIAGNOSIS — E78.2 MIXED HYPERLIPIDEMIA: ICD-10-CM

## 2018-09-12 DIAGNOSIS — Z23 NEEDS FLU SHOT: ICD-10-CM

## 2018-09-12 DIAGNOSIS — I48.0 PAROXYSMAL A-FIB (HCC): ICD-10-CM

## 2018-09-12 DIAGNOSIS — Z85.828 HISTORY OF BASAL CELL CARCINOMA (BCC): ICD-10-CM

## 2018-09-12 PROBLEM — C44.311 BASAL CELL CARCINOMA OF RIGHT NASAL SIDEWALL: Status: RESOLVED | Noted: 2017-06-07 | Resolved: 2018-09-12

## 2018-09-12 LAB
A/G RATIO: 1.4 (ref 1.1–2.2)
ALBUMIN SERPL-MCNC: 4.2 G/DL (ref 3.4–5)
ALP BLD-CCNC: 90 U/L (ref 40–129)
ALT SERPL-CCNC: 15 U/L (ref 10–40)
ANION GAP SERPL CALCULATED.3IONS-SCNC: 16 MMOL/L (ref 3–16)
AST SERPL-CCNC: 19 U/L (ref 15–37)
BASOPHILS ABSOLUTE: 0 K/UL (ref 0–0.2)
BASOPHILS RELATIVE PERCENT: 0.2 %
BILIRUB SERPL-MCNC: 0.4 MG/DL (ref 0–1)
BUN BLDV-MCNC: 16 MG/DL (ref 7–20)
CALCIUM SERPL-MCNC: 10.1 MG/DL (ref 8.3–10.6)
CHLORIDE BLD-SCNC: 103 MMOL/L (ref 99–110)
CHOLESTEROL, TOTAL: 199 MG/DL (ref 0–199)
CO2: 24 MMOL/L (ref 21–32)
CREAT SERPL-MCNC: 0.7 MG/DL (ref 0.6–1.2)
EOSINOPHILS ABSOLUTE: 0.1 K/UL (ref 0–0.6)
EOSINOPHILS RELATIVE PERCENT: 1.3 %
GFR AFRICAN AMERICAN: >60
GFR NON-AFRICAN AMERICAN: >60
GLOBULIN: 3 G/DL
GLUCOSE BLD-MCNC: 97 MG/DL (ref 70–99)
HBA1C MFR BLD: 5.6 %
HCT VFR BLD CALC: 38.5 % (ref 36–48)
HDLC SERPL-MCNC: 40 MG/DL (ref 40–60)
HEMOGLOBIN: 12.3 G/DL (ref 12–16)
LDL CHOLESTEROL CALCULATED: 116 MG/DL
LYMPHOCYTES ABSOLUTE: 2.8 K/UL (ref 1–5.1)
LYMPHOCYTES RELATIVE PERCENT: 26.7 %
MCH RBC QN AUTO: 28.7 PG (ref 26–34)
MCHC RBC AUTO-ENTMCNC: 32 G/DL (ref 31–36)
MCV RBC AUTO: 89.7 FL (ref 80–100)
MONOCYTES ABSOLUTE: 0.8 K/UL (ref 0–1.3)
MONOCYTES RELATIVE PERCENT: 7.5 %
NEUTROPHILS ABSOLUTE: 6.8 K/UL (ref 1.7–7.7)
NEUTROPHILS RELATIVE PERCENT: 64.3 %
PDW BLD-RTO: 14.8 % (ref 12.4–15.4)
PLATELET # BLD: 278 K/UL (ref 135–450)
PMV BLD AUTO: 9.7 FL (ref 5–10.5)
POTASSIUM SERPL-SCNC: 4.4 MMOL/L (ref 3.5–5.1)
RBC # BLD: 4.29 M/UL (ref 4–5.2)
SODIUM BLD-SCNC: 143 MMOL/L (ref 136–145)
TOTAL PROTEIN: 7.2 G/DL (ref 6.4–8.2)
TRIGL SERPL-MCNC: 214 MG/DL (ref 0–150)
VLDLC SERPL CALC-MCNC: 43 MG/DL
WBC # BLD: 10.6 K/UL (ref 4–11)

## 2018-09-12 PROCEDURE — G8417 CALC BMI ABV UP PARAM F/U: HCPCS | Performed by: FAMILY MEDICINE

## 2018-09-12 PROCEDURE — 1090F PRES/ABSN URINE INCON ASSESS: CPT | Performed by: FAMILY MEDICINE

## 2018-09-12 PROCEDURE — 99214 OFFICE O/P EST MOD 30 MIN: CPT | Performed by: FAMILY MEDICINE

## 2018-09-12 PROCEDURE — 4040F PNEUMOC VAC/ADMIN/RCVD: CPT | Performed by: FAMILY MEDICINE

## 2018-09-12 PROCEDURE — G8427 DOCREV CUR MEDS BY ELIG CLIN: HCPCS | Performed by: FAMILY MEDICINE

## 2018-09-12 PROCEDURE — G8400 PT W/DXA NO RESULTS DOC: HCPCS | Performed by: FAMILY MEDICINE

## 2018-09-12 PROCEDURE — G8598 ASA/ANTIPLAT THER USED: HCPCS | Performed by: FAMILY MEDICINE

## 2018-09-12 PROCEDURE — G0008 ADMIN INFLUENZA VIRUS VAC: HCPCS | Performed by: FAMILY MEDICINE

## 2018-09-12 PROCEDURE — 83036 HEMOGLOBIN GLYCOSYLATED A1C: CPT | Performed by: FAMILY MEDICINE

## 2018-09-12 PROCEDURE — 90662 IIV NO PRSV INCREASED AG IM: CPT | Performed by: FAMILY MEDICINE

## 2018-09-12 PROCEDURE — G0439 PPPS, SUBSEQ VISIT: HCPCS | Performed by: FAMILY MEDICINE

## 2018-09-12 PROCEDURE — 1036F TOBACCO NON-USER: CPT | Performed by: FAMILY MEDICINE

## 2018-09-12 PROCEDURE — 1101F PT FALLS ASSESS-DOCD LE1/YR: CPT | Performed by: FAMILY MEDICINE

## 2018-09-12 PROCEDURE — 1123F ACP DISCUSS/DSCN MKR DOCD: CPT | Performed by: FAMILY MEDICINE

## 2018-09-12 RX ORDER — OMEPRAZOLE 20 MG/1
20 CAPSULE, DELAYED RELEASE ORAL DAILY
Qty: 30 CAPSULE | Refills: 3 | COMMUNITY
Start: 2018-09-12 | End: 2021-09-16 | Stop reason: DRUGHIGH

## 2018-09-12 ASSESSMENT — PATIENT HEALTH QUESTIONNAIRE - PHQ9
SUM OF ALL RESPONSES TO PHQ QUESTIONS 1-9: 0
SUM OF ALL RESPONSES TO PHQ QUESTIONS 1-9: 0
1. LITTLE INTEREST OR PLEASURE IN DOING THINGS: 0
2. FEELING DOWN, DEPRESSED OR HOPELESS: 0
SUM OF ALL RESPONSES TO PHQ9 QUESTIONS 1 & 2: 0

## 2018-09-12 ASSESSMENT — LIFESTYLE VARIABLES
HOW OFTEN DURING THE LAST YEAR HAVE YOU BEEN UNABLE TO REMEMBER WHAT HAPPENED THE NIGHT BEFORE BECAUSE YOU HAD BEEN DRINKING: 0
HAVE YOU OR SOMEONE ELSE BEEN INJURED AS A RESULT OF YOUR DRINKING: 0
AUDIT TOTAL SCORE: 2
HOW OFTEN DO YOU HAVE A DRINK CONTAINING ALCOHOL: 2
AUDIT-C TOTAL SCORE: 2
HOW OFTEN DURING THE LAST YEAR HAVE YOU FOUND THAT YOU WERE NOT ABLE TO STOP DRINKING ONCE YOU HAD STARTED: 0
HOW OFTEN DURING THE LAST YEAR HAVE YOU NEEDED AN ALCOHOLIC DRINK FIRST THING IN THE MORNING TO GET YOURSELF GOING AFTER A NIGHT OF HEAVY DRINKING: 0
HOW MANY STANDARD DRINKS CONTAINING ALCOHOL DO YOU HAVE ON A TYPICAL DAY: 0
HAS A RELATIVE, FRIEND, DOCTOR, OR ANOTHER HEALTH PROFESSIONAL EXPRESSED CONCERN ABOUT YOUR DRINKING OR SUGGESTED YOU CUT DOWN: 0
HOW OFTEN DURING THE LAST YEAR HAVE YOU HAD A FEELING OF GUILT OR REMORSE AFTER DRINKING: 0
HOW OFTEN DURING THE LAST YEAR HAVE YOU FAILED TO DO WHAT WAS NORMALLY EXPECTED FROM YOU BECAUSE OF DRINKING: 0
HOW OFTEN DO YOU HAVE SIX OR MORE DRINKS ON ONE OCCASION: 0

## 2018-09-12 ASSESSMENT — ANXIETY QUESTIONNAIRES: GAD7 TOTAL SCORE: 1

## 2018-09-12 NOTE — PATIENT INSTRUCTIONS
FREE \"Exercise & Physical Activity: Your Everyday Guide\" from [employer]. Call 1-215.329.7116 or search [employer] online. · You need 2070-1183 mg of calcium and 9562-0160 IU of vitamin D per day. It is possible to meet your calcium requirement with diet alone, but a vitamin D supplement is usually necessary to meet this goal.  · When exposed to the sun, use a sunscreen that protects against both UVA and UVB radiation with an SPF of 30 or greater. Reapply every 2 to 3 hours or after sweating, drying off with a towel, or swimming. · Always wear a seat belt when traveling in a car. Always wear a helmet when riding a bicycle or motorcycle.

## 2018-09-12 NOTE — PROGRESS NOTES
Medicare Annual Wellness Visit  Name: Gab Gray  YOB: 1940  Age: 68 y.o. Sex: female  MRN: [id number]     Date of Service:  9/12/2018    Scribe documentation: Shantel Padilla am scribing for Shaan Rogers MD. Electronically signed by Marisela Galeazzi  on 9/12/2018 at 10:43 AM.  MD Attestation: Hakan Luna,  personally performed the services described in this documentation, as scribed by the user listed above, and it is both accurate and complete.      LAST LABS   Cholesterol, Total   Date Value Ref Range Status   05/23/2017 173 0 - 199 mg/dL Final     LDL Calculated   Date Value Ref Range Status   05/23/2017 96 <100 mg/dL Final     HDL   Date Value Ref Range Status   05/23/2017 38 (L) 40 - 60 mg/dL Final     Triglycerides   Date Value Ref Range Status   05/23/2017 197 (H) 0 - 150 mg/dL Final     Lab Results   Component Value Date    GLUCOSE 112 (H) 05/23/2017     Lab Results   Component Value Date     05/23/2017    K 4.6 05/23/2017    CREATININE 0.7 05/23/2017     Lab Results   Component Value Date    ALT 14 05/23/2017    AST 15 05/23/2017    ALKPHOS 91 05/23/2017    BILITOT <0.2 05/23/2017      Lab Results   Component Value Date    WBC 9.3 09/08/2016    HGB 11.1 (L) 05/10/2018    HCT 32.9 (L) 05/10/2018    MCV 90.4 09/08/2016     09/08/2016     TSH (uIU/mL)   Date Value   01/28/2015 1.06     Lab Results   Component Value Date    LABA1C 5.6 09/12/2018     CHART REVIEW  Patient Active Problem List   Diagnosis    GERD (gastroesophageal reflux disease)    Urge incontinence    Hyperlipidemia    Chronic back pain    Osteoporosis- last DEXA nl 1/2012    Allergic rhinitis    Edema    Generalized osteoarthritis    Vertigo    Tinnitus    Rotator cuff tear- right    Screen for colon cancer    Screening mammogram, encounter for    Benign essential tremor    [facility] [address]    HTN (hypertension)    Obstructive apnea    Asthmatic bronchitis    Medicare annual no distress, calm   EYES: negative findings: lids and lashes normal and conjunctivae and sclerae normal  ENT: normal TM's and external ear canals both ears  · External nose and ears appear normal  · Pharynx: normal. Exudates: None  · Lips, mucosa, and tongue normal and teeth normal  · Hearing grossly normal.     NECK: No adenopathy, supple, symmetrical, trachea midline  · Thyroid not enlarged, symmetric, no tenderness/mass/nodules  · no cervical nodes, no supraclavicular nodes  LUNGS:  Breathing unlabored  · clear to auscultation bilaterally and good air movement  CARDIOVASC: regular rate and rhythm, S1, S2 normal, no murmur, click, rub or gallop  LEGS:  Lower extremity edema: none    · No carotid bruits  SKIN: warm and dry  · No rashes or suspicious lesions  · R knee scar well healed except 1 small open area and inferior end  PSYCH:  Alert and oriented  · Normal reasoning, insight good  · Facial expressions full, mood appropriate  · No memory disturbance noted  MUSCULOSKEL:  No significant finger or nail findings  · Spine symmetric, no deformities, kyposis present moderate  GAIT: UP and Go test: <30 seconds with gait: stiff-legged. Speed Decreased. No significant balance checks. No extra steps on turn around. Assistive device: cane        Assessment and Plan:      Diagnosis Orders   1. Medicare annual wellness visit, subsequent     2. Paroxysmal A-fib ([address]  Pt encouraged to stay on Xarelto until after event monitor    3. Prediabetes  Doing well with diet  POCT glycosylated hemoglobin (Hb A1C)   4. Essential hypertension  Good control. Current treatment plan is effective, continue same. Lipid Panel   5. Mixed hyperlipidemia  Good control. Current treatment plan is effective, continue same. COMPREHENSIVE METABOLIC PANEL   6. Gastroesophageal reflux disease without esophagitis  Stable with current medications. omeprazole (PRILOSEC) 20 MG delayed release capsule   7.  History of basal cell carcinoma (BCC)

## 2018-09-26 PROBLEM — Z00.00 MEDICARE ANNUAL WELLNESS VISIT, SUBSEQUENT: Status: RESOLVED | Noted: 2017-05-22 | Resolved: 2018-09-26

## 2018-11-01 ENCOUNTER — TELEPHONE (OUTPATIENT)
Dept: FAMILY MEDICINE CLINIC | Age: 78
End: 2018-11-01

## 2018-11-01 ENCOUNTER — PATIENT MESSAGE (OUTPATIENT)
Dept: FAMILY MEDICINE CLINIC | Age: 78
End: 2018-11-01

## 2018-11-01 DIAGNOSIS — J01.90 ACUTE NON-RECURRENT SINUSITIS, UNSPECIFIED LOCATION: Primary | ICD-10-CM

## 2018-11-01 PROCEDURE — 99444 PR PHYSICIAN ONLINE EVALUATION & MANAGEMENT SERVICE: CPT | Performed by: FAMILY MEDICINE

## 2018-11-01 RX ORDER — CEFUROXIME AXETIL 500 MG/1
500 TABLET ORAL 2 TIMES DAILY
Qty: 20 TABLET | Refills: 0 | Status: SHIPPED | OUTPATIENT
Start: 2018-11-01 | End: 2018-11-11

## 2018-11-01 RX ORDER — PROPRANOLOL HYDROCHLORIDE 120 MG/1
CAPSULE, EXTENDED RELEASE ORAL
Qty: 90 CAPSULE | Refills: 0 | Status: SHIPPED | OUTPATIENT
Start: 2018-11-01 | End: 2019-01-27 | Stop reason: SDUPTHER

## 2018-11-01 RX ORDER — LISINOPRIL AND HYDROCHLOROTHIAZIDE 12.5; 1 MG/1; MG/1
TABLET ORAL
Qty: 90 TABLET | Refills: 0 | Status: SHIPPED | OUTPATIENT
Start: 2018-11-01 | End: 2019-01-27 | Stop reason: SDUPTHER

## 2018-11-08 RX ORDER — ATORVASTATIN CALCIUM 20 MG/1
TABLET, FILM COATED ORAL
Qty: 90 TABLET | Refills: 1 | Status: SHIPPED | OUTPATIENT
Start: 2018-11-08 | End: 2019-03-12 | Stop reason: SDUPTHER

## 2018-12-24 RX ORDER — TIZANIDINE 4 MG/1
TABLET ORAL
Qty: 360 TABLET | Refills: 1 | Status: SHIPPED | OUTPATIENT
Start: 2018-12-24 | End: 2019-03-12 | Stop reason: SDUPTHER

## 2019-01-28 RX ORDER — PROPRANOLOL HYDROCHLORIDE 120 MG/1
CAPSULE, EXTENDED RELEASE ORAL
Qty: 90 CAPSULE | Refills: 0 | Status: SHIPPED | OUTPATIENT
Start: 2019-01-28 | End: 2019-03-12 | Stop reason: SDUPTHER

## 2019-01-28 RX ORDER — LISINOPRIL AND HYDROCHLOROTHIAZIDE 12.5; 1 MG/1; MG/1
TABLET ORAL
Qty: 90 TABLET | Refills: 0 | Status: SHIPPED | OUTPATIENT
Start: 2019-01-28 | End: 2019-03-12 | Stop reason: SDUPTHER

## 2019-02-11 ENCOUNTER — PATIENT MESSAGE (OUTPATIENT)
Dept: FAMILY MEDICINE CLINIC | Age: 79
End: 2019-02-11

## 2019-02-28 ENCOUNTER — TELEPHONE (OUTPATIENT)
Dept: FAMILY MEDICINE CLINIC | Age: 79
End: 2019-02-28

## 2019-03-12 ENCOUNTER — OFFICE VISIT (OUTPATIENT)
Dept: FAMILY MEDICINE CLINIC | Age: 79
End: 2019-03-12
Payer: MEDICARE

## 2019-03-12 VITALS
WEIGHT: 203 LBS | HEIGHT: 63 IN | TEMPERATURE: 97.6 F | HEART RATE: 58 BPM | SYSTOLIC BLOOD PRESSURE: 118 MMHG | BODY MASS INDEX: 35.97 KG/M2 | DIASTOLIC BLOOD PRESSURE: 80 MMHG | RESPIRATION RATE: 16 BRPM

## 2019-03-12 DIAGNOSIS — E78.2 MIXED HYPERLIPIDEMIA: ICD-10-CM

## 2019-03-12 DIAGNOSIS — Z12.39 SCREENING FOR BREAST CANCER: ICD-10-CM

## 2019-03-12 DIAGNOSIS — M54.50 CHRONIC BILATERAL LOW BACK PAIN WITHOUT SCIATICA: ICD-10-CM

## 2019-03-12 DIAGNOSIS — I48.0 PAROXYSMAL A-FIB (HCC): ICD-10-CM

## 2019-03-12 DIAGNOSIS — G89.29 CHRONIC BILATERAL LOW BACK PAIN WITHOUT SCIATICA: ICD-10-CM

## 2019-03-12 DIAGNOSIS — J45.20 MILD INTERMITTENT ASTHMATIC BRONCHITIS WITHOUT COMPLICATION: ICD-10-CM

## 2019-03-12 DIAGNOSIS — R73.03 PREDIABETES: ICD-10-CM

## 2019-03-12 DIAGNOSIS — G25.0 BENIGN ESSENTIAL TREMOR: ICD-10-CM

## 2019-03-12 DIAGNOSIS — Z80.3 FAMILY HISTORY OF BREAST CANCER IN SISTER: ICD-10-CM

## 2019-03-12 DIAGNOSIS — I10 ESSENTIAL HYPERTENSION: Primary | ICD-10-CM

## 2019-03-12 PROCEDURE — G8482 FLU IMMUNIZE ORDER/ADMIN: HCPCS | Performed by: FAMILY MEDICINE

## 2019-03-12 PROCEDURE — G8598 ASA/ANTIPLAT THER USED: HCPCS | Performed by: FAMILY MEDICINE

## 2019-03-12 PROCEDURE — 4040F PNEUMOC VAC/ADMIN/RCVD: CPT | Performed by: FAMILY MEDICINE

## 2019-03-12 PROCEDURE — 99214 OFFICE O/P EST MOD 30 MIN: CPT | Performed by: FAMILY MEDICINE

## 2019-03-12 PROCEDURE — 1123F ACP DISCUSS/DSCN MKR DOCD: CPT | Performed by: FAMILY MEDICINE

## 2019-03-12 PROCEDURE — 1036F TOBACCO NON-USER: CPT | Performed by: FAMILY MEDICINE

## 2019-03-12 PROCEDURE — G8400 PT W/DXA NO RESULTS DOC: HCPCS | Performed by: FAMILY MEDICINE

## 2019-03-12 PROCEDURE — 1101F PT FALLS ASSESS-DOCD LE1/YR: CPT | Performed by: FAMILY MEDICINE

## 2019-03-12 PROCEDURE — 1090F PRES/ABSN URINE INCON ASSESS: CPT | Performed by: FAMILY MEDICINE

## 2019-03-12 PROCEDURE — G8427 DOCREV CUR MEDS BY ELIG CLIN: HCPCS | Performed by: FAMILY MEDICINE

## 2019-03-12 PROCEDURE — G8417 CALC BMI ABV UP PARAM F/U: HCPCS | Performed by: FAMILY MEDICINE

## 2019-03-12 RX ORDER — PROPRANOLOL HYDROCHLORIDE 120 MG/1
CAPSULE, EXTENDED RELEASE ORAL
Qty: 90 CAPSULE | Refills: 1 | Status: SHIPPED | OUTPATIENT
Start: 2019-03-12 | End: 2019-04-29

## 2019-03-12 RX ORDER — ATORVASTATIN CALCIUM 20 MG/1
TABLET, FILM COATED ORAL
Qty: 90 TABLET | Refills: 1 | Status: SHIPPED | OUTPATIENT
Start: 2019-03-12 | End: 2019-09-16

## 2019-03-12 RX ORDER — TIZANIDINE 4 MG/1
TABLET ORAL
Qty: 360 TABLET | Refills: 1 | Status: SHIPPED | OUTPATIENT
Start: 2019-03-12 | End: 2019-06-24 | Stop reason: SDUPTHER

## 2019-03-12 RX ORDER — LISINOPRIL AND HYDROCHLOROTHIAZIDE 12.5; 1 MG/1; MG/1
TABLET ORAL
Qty: 90 TABLET | Refills: 1 | Status: SHIPPED | OUTPATIENT
Start: 2019-03-12 | End: 2019-04-29

## 2019-03-12 ASSESSMENT — PATIENT HEALTH QUESTIONNAIRE - PHQ9
SUM OF ALL RESPONSES TO PHQ QUESTIONS 1-9: 0
2. FEELING DOWN, DEPRESSED OR HOPELESS: 0
1. LITTLE INTEREST OR PLEASURE IN DOING THINGS: 0
SUM OF ALL RESPONSES TO PHQ9 QUESTIONS 1 & 2: 0
SUM OF ALL RESPONSES TO PHQ QUESTIONS 1-9: 0

## 2019-03-13 ENCOUNTER — HOSPITAL ENCOUNTER (OUTPATIENT)
Dept: WOMENS IMAGING | Age: 79
Discharge: HOME | End: 2019-03-13
Payer: MEDICARE

## 2019-03-13 DIAGNOSIS — Z12.39 BREAST CANCER SCREENING: ICD-10-CM

## 2019-03-13 PROCEDURE — 77063 BREAST TOMOSYNTHESIS BI: CPT

## 2019-04-11 PROBLEM — Z12.39 SCREENING FOR BREAST CANCER: Status: RESOLVED | Noted: 2019-03-12 | Resolved: 2019-04-11

## 2019-04-23 DIAGNOSIS — I48.0 PAROXYSMAL A-FIB (HCC): ICD-10-CM

## 2019-04-23 RX ORDER — RIVAROXABAN 20 MG/1
TABLET, FILM COATED ORAL
Qty: 90 TABLET | Refills: 1 | Status: SHIPPED | OUTPATIENT
Start: 2019-04-23 | End: 2019-09-16 | Stop reason: SDUPTHER

## 2019-04-29 RX ORDER — PROPRANOLOL HYDROCHLORIDE 120 MG/1
CAPSULE, EXTENDED RELEASE ORAL
Qty: 90 CAPSULE | Refills: 1 | Status: SHIPPED | OUTPATIENT
Start: 2019-04-29 | End: 2019-09-16 | Stop reason: SDUPTHER

## 2019-04-29 RX ORDER — LISINOPRIL AND HYDROCHLOROTHIAZIDE 12.5; 1 MG/1; MG/1
TABLET ORAL
Qty: 90 TABLET | Refills: 1 | Status: SHIPPED | OUTPATIENT
Start: 2019-04-29 | End: 2019-09-16

## 2019-05-30 ENCOUNTER — OFFICE VISIT (OUTPATIENT)
Dept: FAMILY MEDICINE CLINIC | Age: 79
End: 2019-05-30
Payer: MEDICARE

## 2019-05-30 VITALS
HEART RATE: 62 BPM | DIASTOLIC BLOOD PRESSURE: 60 MMHG | HEIGHT: 63 IN | TEMPERATURE: 97.6 F | SYSTOLIC BLOOD PRESSURE: 88 MMHG | WEIGHT: 204 LBS | BODY MASS INDEX: 36.14 KG/M2 | RESPIRATION RATE: 16 BRPM

## 2019-05-30 DIAGNOSIS — R42 VERTIGO: ICD-10-CM

## 2019-05-30 DIAGNOSIS — I95.9 HYPOTENSION, UNSPECIFIED HYPOTENSION TYPE: Primary | ICD-10-CM

## 2019-05-30 PROCEDURE — G8417 CALC BMI ABV UP PARAM F/U: HCPCS | Performed by: FAMILY MEDICINE

## 2019-05-30 PROCEDURE — G8427 DOCREV CUR MEDS BY ELIG CLIN: HCPCS | Performed by: FAMILY MEDICINE

## 2019-05-30 PROCEDURE — 99213 OFFICE O/P EST LOW 20 MIN: CPT | Performed by: FAMILY MEDICINE

## 2019-05-30 PROCEDURE — 4040F PNEUMOC VAC/ADMIN/RCVD: CPT | Performed by: FAMILY MEDICINE

## 2019-05-30 PROCEDURE — G8598 ASA/ANTIPLAT THER USED: HCPCS | Performed by: FAMILY MEDICINE

## 2019-05-30 PROCEDURE — 1123F ACP DISCUSS/DSCN MKR DOCD: CPT | Performed by: FAMILY MEDICINE

## 2019-05-30 PROCEDURE — 1036F TOBACCO NON-USER: CPT | Performed by: FAMILY MEDICINE

## 2019-05-30 PROCEDURE — G8400 PT W/DXA NO RESULTS DOC: HCPCS | Performed by: FAMILY MEDICINE

## 2019-05-30 PROCEDURE — 1090F PRES/ABSN URINE INCON ASSESS: CPT | Performed by: FAMILY MEDICINE

## 2019-05-30 RX ORDER — MECLIZINE HCL 12.5 MG/1
6.25-12.5 TABLET ORAL 3 TIMES DAILY PRN
Qty: 30 TABLET | Refills: 1 | Status: SHIPPED | OUTPATIENT
Start: 2019-05-30 | End: 2020-06-10

## 2019-05-30 NOTE — PATIENT INSTRUCTIONS
INSTRUCTIONS   HOLD lisinopril HCT for now.  Continue propranolol.  Call with BP readings next week.  Get lots of rest and fluids.  For allergies, patient may take OTC antihistamines such as Claritin/Allovert/loratidine or Zyrtec/certrizine.  Use flonase every day.  Get the smaller strength meclizine.  Blood pressure should never be over 140/90. Blood pressure is too low if top number is under 90 AND you feel dizzy. Patient Education       BENIGN PAROXYSMAL VERTIGO    Overview   What is benign paroxysmal positional vertigo? Benign paroxysmal positional vertigo (BPPV) is a problem with the nerves and structure of the inner ear that causes you to suddenly feel dizzy. Symptoms   What are the symptoms of BPPV? You might feel like the room is spinning around in circles or that your surroundings are moving. This feeling is called \"vertigo.  BPPV is associated with feelings of vertigo when you move a certain way (such as turning your head, standing up, rolling over in bed or lying down). You might also feel nauseous (sick to your stomach) at the same time. The nausea and dizziness go away in a few seconds. BPPV is bothersome, but it's rarely serious. Causes & Risk Factors   What causes BPPV? Your inner ear contains tiny calcium particles that help you keep your balance. Normally, these particles are distributed evenly in the inner ears 3 canals. When you move your head, the calcium particles stimulate nerve cells inside the canals. The nerve cells then send your brain a signal telling it which direction your head is moving. Sometimes, the particles can break loose and clump together in one of the canals. When this happens, the nerve cells tell your brain that your head has moved more than it actually has. This incorrect signal results in vertigo.   BPPV is most often associated with aging, but it can also occur after you hit your head or (in more rare cases) you develop a virus in the inner ear.    Diagnosis & Tests   How is BPPV diagnosed? Your doctor may suspect BPPV if you feel dizzy when you move your head or body in certain ways. Several tests can help your doctor tell if your dizziness is caused by BPPV, such as a magnetic resonance imaging (MRI) scan or an electronystagmography (ENG). Treatment   How is BPPV treated? Your doctor can show you some easy head movements to help move the particles out of the inner ear canals and into areas where they will not cause episodes of vertigo. Doing these movements can stop the symptoms and may keep the dizziness from coming back. Your doctor may also give you medicine to treat the nausea and dizziness. In more severe cases, surgery may be necessary to fix the problem. HOME TREATMENT OF BPPV:  MUSA-DAROFF EXERCISES       The Musa-Daroff Exercises are a home method of treating BPPV, usually used when the side of BPPV is unclear. Their use has been declining in recent years, as the home Epley maneuver (see below) is considerably more effective. They succeed in 95% of cases but are more arduous than the office treatments. These exercises also may take longer than the other maneuvers -- the response rate at one week is only about 25% Supa et al, 1999). These exercises are performed in three sets per day for two weeks. In each set, one performs the maneuver as shown five times. 1 repetition = maneuver done to each side in turn (takes 2 minutes)   Suggested Schedule for Musa-Daroff exercises   Time Exercise Duration   Morning 5 repetitions 10 minutes   Noon 5 repetitions 10 minutes   Evening 5 repetitions 10 minutes   Start sitting upright (position 1). Then move into the side-lying position (position 2), with the head angled upward about shelter. An easy way to remember this is to imagine someone standing about 6 feet in front of you, and just keep looking at their head at all times.  Stay in the side-lying position for 30 seconds, or until the dizziness subsides if this is longer, then go back to the sitting position (position 3). Stay there for 30 seconds, and then go to the opposite side (position 4) and follow the same routine. These exercises should be performed for two weeks, three times per day, or for three weeks, twice per day. This adds up to 42 sets in total. In most persons, complete relief from symptoms is obtained after 30 sets, or about 10 days. In approximately 30 percent of patients, BPPV will recur within one year. HOME EPLEY MANEUVER        The Epley and/or Semont maneuvers as described above can be done at home (Barbara et al, 1999; Nga Acosta and Darrin, 2004). We often recommend the home-Epley to our patients who have a clear diagnosis. This procedure seems to be even more effective than the in-office procedure, perhaps because it is repeated every night for a week. The method (for the left side) is performed as shown on the figure to the right. One stays in each of the supine (lying down) positions for 30 seconds, and in the sitting upright position (top) for 1 minute. Thus, once cycle takes 2 1/2 minutes. Typically 3 cycles are performed just prior to going to sleep. It is best to do them at night rather than in the morning or midday, as if one becomes dizzy following the exercises, then it can resolve while one is sleeping. The mirror image of this procedure is used for the right ear.

## 2019-05-30 NOTE — PROGRESS NOTES
DIZZINESS VISIT   Subjective:     Chief Complaint   Patient presents with   Ghassan Brooks is a 66 y.o. female for evaluation of dizziness. The dizziness has been present for 4 days. The patient describes the symptoms as vertigo and lightheadedness. Has =had some spells last few weeks but sudden worse yesterday. Exacerbated by rapid head movements rolling over in bed rising from supine position rising from squatting or sitting position bending motion laying down, arising from seated or lying position. Worse to roll over laying down. Relieved by nothing in particular. Associated symtoms of none, nausea and vomiting a couple times. Denies none, loss of consciousness. Slept last night with meclizine but still dizzy today  Ears itchy L>R    Review of Systems   General ROS: fever? Possibly    Night sweats? No  Ophthalmic ROS:blurry vision or decreased vision? Yes  ENT ROS: headaches? Yes   Sore throat? No  Respiratory ROS: cough? No   Shortness of breath? No  Cardiovascular ROS:chest pain? No   Shortness of breath with exertion? No  Genito-Urinary ROS: painful urination? No   Trouble voiding? No  Neurological ROS: TIA or stroke symptoms? No   Numbness/tingling in feet? No    * Documentation provided by medical assistant reviewed and updated by provider. HISTORY:  Patient's medications, allergies, past medical, and social histories were reviewed and updated as appropriate. CHART REVIEW  Health Maintenance   Topic Date Due    DTaP/Tdap/Td vaccine (2 - Td) 04/14/2014    Shingles Vaccine (2 of 3) 12/06/2016    Potassium monitoring  09/12/2019    Creatinine monitoring  09/12/2019    Breast cancer screen  03/13/2020    DEXA (modify frequency per FRAX score)  Completed    Flu vaccine  Completed    Pneumococcal 65+ years Vaccine  Completed     The ASCVD Risk score (Vera Mi., et al., 2013) failed to calculate for the following reasons:     The valid systolic blood pressure range is 90 to 200 mmHg  Prior to Visit Medications    Medication Sig Taking? Authorizing Provider   meclizine (ANTIVERT) 12.5 MG tablet Take 0.5-1 tablets by mouth 3 times daily as needed for Dizziness or Nausea Yes Nestor Pastor MD   lisinopril-hydrochlorothiazide (PRINZIDE;ZESTORETIC) 10-12.5 MG per tablet TAKE 1 TABLET BY MOUTH EVERY DAY Yes Nestor Pastor MD   propranolol (INDERAL LA) 120 MG extended release capsule TAKE ONE CAPSULE BY MOUTH EVERY DAY Yes Nestor Pastor MD   XARELTO 20 MG TABS tablet TAKE 1 TABLET BY MOUTH DAILY WITH BREAKFAST Yes Nestor Pastor MD   tiZANidine (ZANAFLEX) 4 MG tablet TAKE 1 TABLET BY MOUTH EVERY 6 HOURS AS NEEDED( MUSCLES) Yes Nestor Pastor MD   atorvastatin (LIPITOR) 20 MG tablet TAKE 1 TABLET BY MOUTH EVERY EVENING Yes Nestor Pastor MD   omeprazole (PRILOSEC) 20 MG delayed release capsule Take 1 capsule by mouth Daily Yes Nestor Pastor MD   traMADol (ULTRAM) 50 MG tablet Take  mg by mouth. . Yes Historical Provider, MD   fluocinonide (LIDEX) 0.05 % external solution Apply sparingly to scalp daily if needed for itching.  Yes Amor Lock MD   fluticasone [facility]) 50 MCG/ACT nasal spray 1 spray by Nasal route daily Yes Nestor Pastor MD   BIOTIN PO Take by mouth Yes Historical Provider, MD      Family History   Problem Relation Age of Onset    Diabetes Mother     Stroke Mother     Coronary Art Dis Mother     Coronary Art Dis Father     Diabetes Father     Breast Cancer Sister     Hypertension Sister     Hypertension Brother     Prostate Cancer Brother      Social History     Tobacco Use    Smoking status: Former Smoker     Packs/day: 0.25     Years: 20.00     Pack years: 5.00     Types: Cigarettes     Last attempt to quit: 2014     Years since quittin.7    Smokeless tobacco: Never Used    Tobacco comment: Advised not to resume   Substance Use Topics    Alcohol use: Yes     Comment: socially    Drug use: No      LAST LABS  Cholesterol, Total   Date Value Ref Range auscultation bilaterally and good air movement  CARDIOVASC: regular rate and rhythm, S1, S2 normal, no murmur, click, rub or gallop  · Apical impulse normal   LEGS:  Lower extremity edema: none       Assessment and Plan:      Diagnosis Orders   1. Hypotension, unspecified hypotension type     2. Vertigo  meclizine (ANTIVERT) 12.5 MG tablet    Ambulatory referral to ENT   worsened. Plan as above and below. INSTRUCTIONS   HOLD lisinopril HCT for now.  Continue propranolol.  Call with BP readings next week.  Get lots of rest and fluids.  For allergies, patient may take OTC antihistamines such as Claritin/Allovert/loratidine or Zyrtec/certrizine.  Use flonase every day.  Get the smaller strength meclizine.

## 2019-07-01 ENCOUNTER — PATIENT MESSAGE (OUTPATIENT)
Dept: FAMILY MEDICINE CLINIC | Age: 79
End: 2019-07-01

## 2019-07-01 RX ORDER — PREDNISONE 20 MG/1
20 TABLET ORAL 2 TIMES DAILY
Qty: 10 TABLET | Refills: 0 | Status: SHIPPED | OUTPATIENT
Start: 2019-07-01 | End: 2019-07-06

## 2019-07-01 NOTE — TELEPHONE ENCOUNTER
From: Higinio Jordan  To: Steven Damon MD  Sent: 7/1/2019 11:10 AM EDT  Subject: Non-Urgent Medical Question    Dr. Seth Churchill,  I am still wheezing and I have been on the albuterol for a week now. My  nose is dripping and I have a cough. Mainly coughing at night when I am  laying down. I get short of breath just doing mundane things. Do I need a  steroid or should I make an appointment to come in. Please let me know.   Thank Keith Barrera

## 2019-07-08 RX ORDER — ALBUTEROL SULFATE 90 UG/1
AEROSOL, METERED RESPIRATORY (INHALATION)
Qty: 6.7 G | Refills: 3 | Status: SHIPPED | OUTPATIENT
Start: 2019-07-08 | End: 2020-04-30 | Stop reason: SDUPTHER

## 2019-09-16 ENCOUNTER — OFFICE VISIT (OUTPATIENT)
Dept: FAMILY MEDICINE CLINIC | Age: 79
End: 2019-09-16
Payer: MEDICARE

## 2019-09-16 VITALS
RESPIRATION RATE: 16 BRPM | BODY MASS INDEX: 37.39 KG/M2 | DIASTOLIC BLOOD PRESSURE: 74 MMHG | HEART RATE: 86 BPM | SYSTOLIC BLOOD PRESSURE: 106 MMHG | WEIGHT: 211 LBS | HEIGHT: 63 IN | OXYGEN SATURATION: 94 %

## 2019-09-16 DIAGNOSIS — I48.0 PAROXYSMAL A-FIB (HCC): ICD-10-CM

## 2019-09-16 DIAGNOSIS — E78.2 MIXED HYPERLIPIDEMIA: ICD-10-CM

## 2019-09-16 DIAGNOSIS — J45.20 MILD INTERMITTENT ASTHMATIC BRONCHITIS WITHOUT COMPLICATION: ICD-10-CM

## 2019-09-16 DIAGNOSIS — R73.03 PREDIABETES: ICD-10-CM

## 2019-09-16 DIAGNOSIS — I10 ESSENTIAL HYPERTENSION: ICD-10-CM

## 2019-09-16 DIAGNOSIS — Z00.00 ROUTINE GENERAL MEDICAL EXAMINATION AT A HEALTH CARE FACILITY: Primary | ICD-10-CM

## 2019-09-16 DIAGNOSIS — L65.9 HAIR LOSS: ICD-10-CM

## 2019-09-16 DIAGNOSIS — J44.9 CHRONIC OBSTRUCTIVE PULMONARY DISEASE, UNSPECIFIED COPD TYPE (HCC): ICD-10-CM

## 2019-09-16 LAB
A/G RATIO: 1.5 (ref 1.1–2.2)
ALBUMIN SERPL-MCNC: 4 G/DL (ref 3.4–5)
ALP BLD-CCNC: 99 U/L (ref 40–129)
ALT SERPL-CCNC: 11 U/L (ref 10–40)
ANION GAP SERPL CALCULATED.3IONS-SCNC: 17 MMOL/L (ref 3–16)
AST SERPL-CCNC: 15 U/L (ref 15–37)
BASOPHILS ABSOLUTE: 0 K/UL (ref 0–0.2)
BASOPHILS RELATIVE PERCENT: 0.4 %
BILIRUB SERPL-MCNC: 0.4 MG/DL (ref 0–1)
BUN BLDV-MCNC: 11 MG/DL (ref 7–20)
CALCIUM SERPL-MCNC: 9.3 MG/DL (ref 8.3–10.6)
CHLORIDE BLD-SCNC: 107 MMOL/L (ref 99–110)
CHOLESTEROL, TOTAL: 160 MG/DL (ref 0–199)
CO2: 23 MMOL/L (ref 21–32)
CREAT SERPL-MCNC: 0.6 MG/DL (ref 0.6–1.2)
EOSINOPHILS ABSOLUTE: 0.2 K/UL (ref 0–0.6)
EOSINOPHILS RELATIVE PERCENT: 2.5 %
GFR AFRICAN AMERICAN: >60
GFR NON-AFRICAN AMERICAN: >60
GLOBULIN: 2.6 G/DL
GLUCOSE BLD-MCNC: 101 MG/DL (ref 70–99)
HCT VFR BLD CALC: 35.9 % (ref 36–48)
HDLC SERPL-MCNC: 47 MG/DL (ref 40–60)
HEMOGLOBIN: 11.6 G/DL (ref 12–16)
LDL CHOLESTEROL CALCULATED: 78 MG/DL
LYMPHOCYTES ABSOLUTE: 1.9 K/UL (ref 1–5.1)
LYMPHOCYTES RELATIVE PERCENT: 24.8 %
MCH RBC QN AUTO: 28.3 PG (ref 26–34)
MCHC RBC AUTO-ENTMCNC: 32.3 G/DL (ref 31–36)
MCV RBC AUTO: 87.4 FL (ref 80–100)
MONOCYTES ABSOLUTE: 0.5 K/UL (ref 0–1.3)
MONOCYTES RELATIVE PERCENT: 7 %
NEUTROPHILS ABSOLUTE: 5 K/UL (ref 1.7–7.7)
NEUTROPHILS RELATIVE PERCENT: 65.3 %
PDW BLD-RTO: 15 % (ref 12.4–15.4)
PLATELET # BLD: 204 K/UL (ref 135–450)
PMV BLD AUTO: 9.2 FL (ref 5–10.5)
POTASSIUM SERPL-SCNC: 4.5 MMOL/L (ref 3.5–5.1)
RBC # BLD: 4.11 M/UL (ref 4–5.2)
SODIUM BLD-SCNC: 147 MMOL/L (ref 136–145)
TOTAL PROTEIN: 6.6 G/DL (ref 6.4–8.2)
TRIGL SERPL-MCNC: 177 MG/DL (ref 0–150)
TSH SERPL DL<=0.05 MIU/L-ACNC: 1.13 UIU/ML (ref 0.27–4.2)
VLDLC SERPL CALC-MCNC: 35 MG/DL
WBC # BLD: 7.7 K/UL (ref 4–11)

## 2019-09-16 PROCEDURE — G8598 ASA/ANTIPLAT THER USED: HCPCS | Performed by: FAMILY MEDICINE

## 2019-09-16 PROCEDURE — G0439 PPPS, SUBSEQ VISIT: HCPCS | Performed by: FAMILY MEDICINE

## 2019-09-16 PROCEDURE — 1123F ACP DISCUSS/DSCN MKR DOCD: CPT | Performed by: FAMILY MEDICINE

## 2019-09-16 PROCEDURE — 99214 OFFICE O/P EST MOD 30 MIN: CPT | Performed by: FAMILY MEDICINE

## 2019-09-16 PROCEDURE — 4040F PNEUMOC VAC/ADMIN/RCVD: CPT | Performed by: FAMILY MEDICINE

## 2019-09-16 RX ORDER — PROPRANOLOL HYDROCHLORIDE 120 MG/1
CAPSULE, EXTENDED RELEASE ORAL
Qty: 90 CAPSULE | Refills: 1 | Status: SHIPPED | OUTPATIENT
Start: 2019-09-16 | End: 2019-09-16

## 2019-09-16 ASSESSMENT — LIFESTYLE VARIABLES
AUDIT-C TOTAL SCORE: 2
HOW MANY STANDARD DRINKS CONTAINING ALCOHOL DO YOU HAVE ON A TYPICAL DAY: 0
AUDIT TOTAL SCORE: 2
HOW OFTEN DURING THE LAST YEAR HAVE YOU NEEDED AN ALCOHOLIC DRINK FIRST THING IN THE MORNING TO GET YOURSELF GOING AFTER A NIGHT OF HEAVY DRINKING: 0
HOW OFTEN DURING THE LAST YEAR HAVE YOU FOUND THAT YOU WERE NOT ABLE TO STOP DRINKING ONCE YOU HAD STARTED: 0
HOW OFTEN DURING THE LAST YEAR HAVE YOU BEEN UNABLE TO REMEMBER WHAT HAPPENED THE NIGHT BEFORE BECAUSE YOU HAD BEEN DRINKING: 0
HOW OFTEN DO YOU HAVE A DRINK CONTAINING ALCOHOL: 2
HAS A RELATIVE, FRIEND, DOCTOR, OR ANOTHER HEALTH PROFESSIONAL EXPRESSED CONCERN ABOUT YOUR DRINKING OR SUGGESTED YOU CUT DOWN: 0
HOW OFTEN DURING THE LAST YEAR HAVE YOU FAILED TO DO WHAT WAS NORMALLY EXPECTED FROM YOU BECAUSE OF DRINKING: 0
HAVE YOU OR SOMEONE ELSE BEEN INJURED AS A RESULT OF YOUR DRINKING: 0
HOW OFTEN DURING THE LAST YEAR HAVE YOU HAD A FEELING OF GUILT OR REMORSE AFTER DRINKING: 0
HOW OFTEN DO YOU HAVE SIX OR MORE DRINKS ON ONE OCCASION: 0

## 2019-09-16 ASSESSMENT — PATIENT HEALTH QUESTIONNAIRE - PHQ9
SUM OF ALL RESPONSES TO PHQ QUESTIONS 1-9: 0
SUM OF ALL RESPONSES TO PHQ QUESTIONS 1-9: 0

## 2019-09-16 NOTE — PATIENT INSTRUCTIONS
FYI: While Medicare provides you with a FREE ANNUAL PREVENTIVE PHYSICAL, this visit does NOT include management of chronic medical problems or physical examination. Dr. Shiloh Valentin usually does a combination visit if you have other medical problems so you don't have to come back for another visit. However, this means that there will be a co-pay. INSTRUCTIONS  NEXT APPOINTMENT: Please schedule check-up in 6 weeks. Sooner if breathing not improving. · PLEASE TAKE THIS FORM TO CHECK-OUT WINDOW TO SCHEDULE NEXT VISIT. · PLEASE GET BLOODWORK DRAWN TODAY ON FIRST FLOOR in 170. Take orders with you. RESULTS- most blood tests back in couple days. We will call you if any problems. If bloodwork good, you will get letter in mail or notified thru [address] (if signed up) within 2 weeks. If you do not, please call office. · Please get flu vaccine when available in fall. Can get either at this office or at stores such as [employer] and [employer]. · Eat less, move more! You can do it! · Due for tetanus booster which prevents lockjaw from injuries. 2 options:  Medicare only covers for injury. Call to be seen for tetanus booster if you have any cuts, punctures or other open skin injury. OR get tetanus booster at pharmacy (like [facility] or [facility]) for approximately $50.     · Use flonase daily during allergy season. · STOP propranolol. Check blood pressure at home every few days for next month. · Blood pressure should never be over 140/90. Blood pressure is too low if top number is under 90 AND you feel dizzy. ·   Patient Education   STRESS: HOW TO BETTER COPE    What causes stress? Feelings of stress are caused by the body's instinct to defend itself. This instinct is good in emergencies, such as getting out of the way of a speeding car. But stress can cause unhealthy physical symptoms if it goes on for too long, such as in response to life's daily challenges and changes.   When this happens, it's as though your body gets ready to jump out of the way of the car, but you're sitting still. Your body is working overtime, with no place to put all the extra energy. This can make you feel anxious, afraid, worried and uptight. What changes may be stressful? Any sort of change can make you feel stressed, even good change. It's not just the change or event itself, but also how you react to it that matters. What's stressful is different for each person. For example, one person may feel stressed by retiring from work, while someone else may not. Other things that may be stressful include being laid off from your job, your child leaving or returning home, the death of your spouse, divorce or marriage, an illness, an injury, a job promotion, money problems, moving, or having a baby. Can stress hurt my health? Stress can cause health problems or make health problems worse. Talk to your family doctor if you think some of your symptoms are caused by stress. It's important to make sure that your symptoms aren't caused by other health problems. Possible signs of stress  Anxiety   Back pain   Constipation or diarrhea   Depression   Fatigue   Headaches   High blood pressure   Trouble sleeping or insomnia   Problems with relationships   Shortness of breath   Stiff neck or jaw   Upset stomach   Weight gain or loss     What can I do to manage my stress? The first step is to learn to recognize when you're feeling stressed. Early warning signs of stress include tension in your shoulders and neck, or clenching your hands into fists. The next step is to choose a way to deal with your stress. One way is to avoid the event or thing that leads to your stress--but often this is not possible. A second way is to change how you react to stress. This is often the more practical way. Tips for dealing with stress  Don't worry about things you can't control, such as the weather. Solve the little problems.  This can help you gain a feeling of serious COPD. It also helps people who have trouble using handheld inhalers. Your doctor will tell you how to take your medicine. It is important to follow your doctor's instructions carefully so that your lungs receive the right amount of medicine. Complications   What are the complications of COPD? If you have COPD, you might be more likely to get colds and flu. Because your heart can be strained, it will get bigger. You might have high pressure in the vessels that bring blood to your lungs. You should have a flu shot every year. You should also have a pneumonia shot. You are less likely to get flu or pneumonia if you have these shots. Rehabilitation and exercise programs specifically for people who have COPD may also be helpful. TIPS ON WEIGHT LOSS    Weight loss maintenance is considered successful if you lose at least 10 percent of your body weight and keep that weight off for at least one year. Ideas for better weight control. Try implementing one a week. · Drink only sugar free beverages. · Drink at least 8 cups per day. · Do not eat after 7 PM.  · Snack every 2 hours during the day on 100 calorie snacks (apple, strawberry, almonds, pistachio, walnuts, cheese cubes, raw veggies like bell peppers, tomato, celery, zucchini, snow peas, broccoli). · At meals, limit portion sizes to what you could hold in your hand of a meat. · Eat all of the raw vegetables and salads that you want with vinegar or low kevin dressing. · Sleep 8 hours at night. · Minimize white starches- bread, pasta, rice potatoes. Try high fiber cereal, breads, granola. · Move more- try walking 15 minutes per day. · Use small plates and bowls to make serving look bigger. · Keeping track of calories and fat grams. Try cell phone abner called \"Lose-it\"  · Planning your meals ahead of time  · Eating breakfast every day  · Keeping your diet steady.   Eating the same on weekends,vacations and special clearly understood by everyone you have told. Personalized Preventive Plan for Shaun Madrigal - 9/16/2019  Medicare offers a range of preventive health benefits. Some of the tests and screenings are paid in full while other may be subject to a deductible, co-insurance, and/or copay. Some of these benefits include a comprehensive review of your medical history including lifestyle, illnesses that may run in your family, and various assessments and screenings as appropriate. After reviewing your medical record and screening and assessments performed today your provider may have ordered immunizations, labs, imaging, and/or referrals for you. A list of these orders (if applicable) as well as your Preventive Care list are included within your After Visit Summary for your review. Other Preventive Recommendations:    · A preventive eye exam performed by an eye specialist is recommended every 1-2 years to screen for glaucoma; cataracts, macular degeneration, and other eye disorders. · A preventive dental visit is recommended every 6 months. · Try to get at least 150 minutes of exercise per week or 10,000 steps per day on a pedometer . · Order or download the FREE \"Exercise & Physical Activity: Your Everyday Guide\" from [employer]. Call 8-621.427.9166 or search [employer] online. · You need 7458-6087 mg of calcium and 9022-8924 IU of vitamin D per day. It is possible to meet your calcium requirement with diet alone, but a vitamin D supplement is usually necessary to meet this goal.  · When exposed to the sun, use a sunscreen that protects against both UVA and UVB radiation with an SPF of 30 or greater. Reapply every 2 to 3 hours or after sweating, drying off with a towel, or swimming. · Always wear a seat belt when traveling in a car. Always wear a helmet when riding a bicycle or motorcycle.

## 2019-09-16 NOTE — PROGRESS NOTES
(Internal Medicine)  Pavithra Manzanares MD as Consulting Physician (Otolaryngology)    The following problems were reviewed today and where indicated follow up appointments were made and/or referrals ordered. Positive Risk Factor Screenings with Interventions:     General Health:  General  In general, how would you say your health is?: Very Good  In the past 7 days, have you experienced any of the following? New or Increased Pain, New or Increased Fatigue, Loneliness, Social Isolation, Stress or Anger?: (!) Stress  Do you get the social and emotional support that you need?: Yes  Do you have a Living Will?: (!) No  General Health Risk Interventions:  · Stress: patient declines any further evaluation/treatment for this issue      Health Habits/Nutrition:  Health Habits/Nutrition  Do you exercise for at least 20 minutes 2-3 times per week?: (!) No  Have you lost any weight without trying in the past 3 months?: No  Do you eat fewer than 2 meals per day?: No  Have you seen a dentist within the past year?: Yes  Body mass index is 37.38 kg/m². Health Habits/Nutrition Interventions:  · Nutritional issues:  educational materials to promote weight loss provided      Hearing/Vision:  No exam data present  Hearing/Vision  Do you or your family notice any trouble with your hearing?: (!) Yes  Do you have difficulty driving, watching TV, or doing any of your daily activities because of your eyesight?: No  Have you had an eye exam within the past year?: (!) No  Hearing/Vision Interventions:  · Hearing concerns:  patient declines any further evaluation/treatment for hearing issues  · Vision concerns:  patient encouraged to make appointment with his/her eye specialist    Current Health Maintenance Status  Recommendations for Preventive Services Due: see orders.   Recommended screening schedule for the next 5-10 years is provided to the patient in written form: see Patient Instructions/AVS.    PHYSICAL EXAM:  VITALS:  /74 (Site:

## 2019-09-17 LAB
ESTIMATED AVERAGE GLUCOSE: 111.2 MG/DL
HBA1C MFR BLD: 5.5 %

## 2019-09-19 DIAGNOSIS — M54.50 CHRONIC BILATERAL LOW BACK PAIN WITHOUT SCIATICA: ICD-10-CM

## 2019-09-19 DIAGNOSIS — G89.29 CHRONIC BILATERAL LOW BACK PAIN WITHOUT SCIATICA: ICD-10-CM

## 2019-09-20 RX ORDER — TIZANIDINE 4 MG/1
TABLET ORAL
Qty: 360 TABLET | Refills: 0 | Status: SHIPPED | OUTPATIENT
Start: 2019-09-20 | End: 2020-01-09

## 2019-10-15 DIAGNOSIS — I48.0 PAROXYSMAL A-FIB (HCC): ICD-10-CM

## 2019-10-15 DIAGNOSIS — I10 ESSENTIAL HYPERTENSION: ICD-10-CM

## 2019-10-15 RX ORDER — PROPRANOLOL HYDROCHLORIDE 120 MG/1
CAPSULE, EXTENDED RELEASE ORAL
Qty: 90 CAPSULE | Refills: 1 | COMMUNITY
Start: 2019-10-15 | End: 2019-10-28 | Stop reason: SDUPTHER

## 2019-10-16 PROBLEM — Z00.00 ROUTINE GENERAL MEDICAL EXAMINATION AT A HEALTH CARE FACILITY: Chronic | Status: RESOLVED | Noted: 2019-03-12 | Resolved: 2019-10-16

## 2019-10-28 ENCOUNTER — OFFICE VISIT (OUTPATIENT)
Dept: FAMILY MEDICINE CLINIC | Age: 79
End: 2019-10-28
Payer: MEDICARE

## 2019-10-28 VITALS
RESPIRATION RATE: 16 BRPM | BODY MASS INDEX: 36.68 KG/M2 | WEIGHT: 207 LBS | HEIGHT: 63 IN | SYSTOLIC BLOOD PRESSURE: 126 MMHG | DIASTOLIC BLOOD PRESSURE: 82 MMHG | HEART RATE: 81 BPM

## 2019-10-28 DIAGNOSIS — Z23 NEEDS FLU SHOT: ICD-10-CM

## 2019-10-28 DIAGNOSIS — I48.0 PAROXYSMAL A-FIB (HCC): ICD-10-CM

## 2019-10-28 DIAGNOSIS — I10 ESSENTIAL HYPERTENSION: Primary | ICD-10-CM

## 2019-10-28 DIAGNOSIS — R73.03 PREDIABETES: ICD-10-CM

## 2019-10-28 DIAGNOSIS — R04.0 EPISTAXIS: ICD-10-CM

## 2019-10-28 DIAGNOSIS — H11.31 SUBCONJUNCTIVAL HEMORRHAGE OF RIGHT EYE: ICD-10-CM

## 2019-10-28 DIAGNOSIS — G25.0 BENIGN ESSENTIAL TREMOR: ICD-10-CM

## 2019-10-28 PROCEDURE — 1090F PRES/ABSN URINE INCON ASSESS: CPT | Performed by: FAMILY MEDICINE

## 2019-10-28 PROCEDURE — 1036F TOBACCO NON-USER: CPT | Performed by: FAMILY MEDICINE

## 2019-10-28 PROCEDURE — G8400 PT W/DXA NO RESULTS DOC: HCPCS | Performed by: FAMILY MEDICINE

## 2019-10-28 PROCEDURE — G8427 DOCREV CUR MEDS BY ELIG CLIN: HCPCS | Performed by: FAMILY MEDICINE

## 2019-10-28 PROCEDURE — G8598 ASA/ANTIPLAT THER USED: HCPCS | Performed by: FAMILY MEDICINE

## 2019-10-28 PROCEDURE — 4040F PNEUMOC VAC/ADMIN/RCVD: CPT | Performed by: FAMILY MEDICINE

## 2019-10-28 PROCEDURE — G0008 ADMIN INFLUENZA VIRUS VAC: HCPCS | Performed by: FAMILY MEDICINE

## 2019-10-28 PROCEDURE — 99214 OFFICE O/P EST MOD 30 MIN: CPT | Performed by: FAMILY MEDICINE

## 2019-10-28 PROCEDURE — G8417 CALC BMI ABV UP PARAM F/U: HCPCS | Performed by: FAMILY MEDICINE

## 2019-10-28 PROCEDURE — 1123F ACP DISCUSS/DSCN MKR DOCD: CPT | Performed by: FAMILY MEDICINE

## 2019-10-28 PROCEDURE — G8482 FLU IMMUNIZE ORDER/ADMIN: HCPCS | Performed by: FAMILY MEDICINE

## 2019-10-28 PROCEDURE — 90653 IIV ADJUVANT VACCINE IM: CPT | Performed by: FAMILY MEDICINE

## 2019-10-28 RX ORDER — PROPRANOLOL HYDROCHLORIDE 160 MG/1
CAPSULE, EXTENDED RELEASE ORAL
Qty: 90 CAPSULE | Refills: 1 | Status: SHIPPED | OUTPATIENT
Start: 2019-10-28 | End: 2020-02-11

## 2020-01-09 ENCOUNTER — OFFICE VISIT (OUTPATIENT)
Dept: ENT CLINIC | Age: 80
End: 2020-01-09
Payer: MEDICARE

## 2020-01-09 VITALS
HEIGHT: 63 IN | SYSTOLIC BLOOD PRESSURE: 129 MMHG | WEIGHT: 206.6 LBS | BODY MASS INDEX: 36.61 KG/M2 | HEART RATE: 91 BPM | DIASTOLIC BLOOD PRESSURE: 59 MMHG | TEMPERATURE: 97.1 F

## 2020-01-09 PROCEDURE — G8427 DOCREV CUR MEDS BY ELIG CLIN: HCPCS | Performed by: OTOLARYNGOLOGY

## 2020-01-09 PROCEDURE — G8400 PT W/DXA NO RESULTS DOC: HCPCS | Performed by: OTOLARYNGOLOGY

## 2020-01-09 PROCEDURE — 4040F PNEUMOC VAC/ADMIN/RCVD: CPT | Performed by: OTOLARYNGOLOGY

## 2020-01-09 PROCEDURE — 99203 OFFICE O/P NEW LOW 30 MIN: CPT | Performed by: OTOLARYNGOLOGY

## 2020-01-09 PROCEDURE — 1036F TOBACCO NON-USER: CPT | Performed by: OTOLARYNGOLOGY

## 2020-01-09 PROCEDURE — 1090F PRES/ABSN URINE INCON ASSESS: CPT | Performed by: OTOLARYNGOLOGY

## 2020-01-09 PROCEDURE — G8417 CALC BMI ABV UP PARAM F/U: HCPCS | Performed by: OTOLARYNGOLOGY

## 2020-01-09 PROCEDURE — 1123F ACP DISCUSS/DSCN MKR DOCD: CPT | Performed by: OTOLARYNGOLOGY

## 2020-01-09 PROCEDURE — G8482 FLU IMMUNIZE ORDER/ADMIN: HCPCS | Performed by: OTOLARYNGOLOGY

## 2020-01-09 NOTE — PROGRESS NOTES
Past Medical History:   Diagnosis Date    Allergic rhinitis     Asthma     Benign essential tremor 1/5/2012    Cancer ([address]     skin cancer    Chronic back pain     Chronic sinusitis     Dizziness     Edema     Generalized osteoarthritis     GERD (gastroesophageal reflux disease)     Hearing loss     Herpes zoster 2010    Hyperlipidemia     Hypertension     Hypertriglyceridemia, essential     Keloid scar     Lung disease     Nonspecific (abnormal) findings on radiological and other examination of skull and head 2010    neg bone scan    Nosebleed     Osteoporosis     DEXA Scan: T -3.7 12/02; -0.5 10/05, 8/08 -0.9    Paroxysmal A-fib ([address] 2/2012    once    PONV (postoperative nausea and vomiting)     Rotator cuff tear- right     Screening for cardiovascular condition     2/64 mild diastolic dysfxn    Sleep apnea     Tinnitus     Urge incontinence     Vertigo                                                     Past Surgical History:   Procedure Laterality Date    BACK SURGERY  1982    BACK SURGERY  2000    epidural    CHEST TUBE INSERTION  2014    Right VATS drainage of loculated effusion, decortication    HYSTERECTOMY  1983    both ovaries intact    HYSTERECTOMY, TOTAL ABDOMINAL      KNEE ARTHROSCOPY  2003    Right    MOHS SURGERY      ROTATOR CUFF REPAIR  2011    right    TOTAL HIP ARTHROPLASTY  2012    left    TOTAL KNEE ARTHROPLASTY Right 06/2018     FAMILY HISTORY: Family history reviewed. Except as noted in history of present illness, there is no pertinent family history      REVIEW OF SYSTEMS:  All pertinent positive and negative review of systems included in HPI. Otherwise, all systems are reviewed and negative.     PHYSICAL EXAMINATION:   GENERAL: wdwn- no acute distress  RESPIRATORY:  No stridor or respiratory distress  COMMUNICATION :  Normal voice  MENTAL STATUS:  Mood and affect normal, oriented X 3  HEAD AND FACE:  No abnormalities of the skin of face or

## 2020-02-10 ENCOUNTER — TELEPHONE (OUTPATIENT)
Dept: FAMILY MEDICINE CLINIC | Age: 80
End: 2020-02-10

## 2020-02-10 NOTE — TELEPHONE ENCOUNTER
From: Jaiden Solitario  To: Juan Castillo MD  Sent: 2/10/2020 12:51 PM EST  Subject: Prescription Question    I'm calling for my wife,Erinn,we have a new computer and she tried to re-set her password and it disabled her \"My Chart\" program. If possible ,can your staff fix that? ? Thank you,  Next, for her,is there an alternative to Propranolol ? They have raised the price a lot and would very much know if there is a cheaper or generic alternative.   Thanks on both questions.

## 2020-02-11 RX ORDER — METOPROLOL SUCCINATE 50 MG/1
50 TABLET, EXTENDED RELEASE ORAL DAILY
Qty: 90 TABLET | Refills: 0 | Status: SHIPPED | OUTPATIENT
Start: 2020-02-11 | End: 2020-03-16

## 2020-02-11 NOTE — TELEPHONE ENCOUNTER
Sent in metoprolol succinate 50 mg daily to replace the propranolol. Should come in for MA visit in 2 weeks for BP and HR check.

## 2020-03-11 ENCOUNTER — TELEPHONE (OUTPATIENT)
Dept: FAMILY MEDICINE CLINIC | Age: 80
End: 2020-03-11

## 2020-03-16 RX ORDER — PROPRANOLOL HYDROCHLORIDE 160 MG/1
CAPSULE, EXTENDED RELEASE ORAL
Qty: 90 CAPSULE | Refills: 1 | Status: SHIPPED | OUTPATIENT
Start: 2020-03-16 | End: 2020-04-30

## 2020-04-30 ENCOUNTER — VIRTUAL VISIT (OUTPATIENT)
Dept: FAMILY MEDICINE CLINIC | Age: 80
End: 2020-04-30
Payer: MEDICARE

## 2020-04-30 PROCEDURE — G8400 PT W/DXA NO RESULTS DOC: HCPCS | Performed by: FAMILY MEDICINE

## 2020-04-30 PROCEDURE — 99214 OFFICE O/P EST MOD 30 MIN: CPT | Performed by: FAMILY MEDICINE

## 2020-04-30 PROCEDURE — 4040F PNEUMOC VAC/ADMIN/RCVD: CPT | Performed by: FAMILY MEDICINE

## 2020-04-30 PROCEDURE — 1090F PRES/ABSN URINE INCON ASSESS: CPT | Performed by: FAMILY MEDICINE

## 2020-04-30 PROCEDURE — G8427 DOCREV CUR MEDS BY ELIG CLIN: HCPCS | Performed by: FAMILY MEDICINE

## 2020-04-30 PROCEDURE — 1123F ACP DISCUSS/DSCN MKR DOCD: CPT | Performed by: FAMILY MEDICINE

## 2020-04-30 RX ORDER — ALBUTEROL SULFATE 90 UG/1
AEROSOL, METERED RESPIRATORY (INHALATION)
Qty: 6.7 G | Refills: 3 | Status: SHIPPED | OUTPATIENT
Start: 2020-04-30 | End: 2021-08-12 | Stop reason: SDUPTHER

## 2020-04-30 NOTE — PROGRESS NOTES
TELEHEALTH EVALUATION -- Audio/Visual (During ZDWPA-56 public health emergency)  Chief Complaint   Patient presents with    Diabetes     1. Essential hypertension    2. Mixed hyperlipidemia    3. Paroxysmal A-fib ([address]    4. Prediabetes    5. Generalized osteoarthritis      Diabetes Mellitus Follow-up  Subjective:     Denisha Diaz is an 78 y.o. female who presents for follow up of diabetes. 1. Essential hypertension    2. Mixed hyperlipidemia    3. Paroxysmal A-fib ([address]    4. Prediabetes    5. Generalized osteoarthritis    6. Mild intermittent asthmatic bronchitis without complication      Patent follows diabetic diet? Yes  Patient exercises regularly? Sometimes  Any shortness of breath? Yes and symptoms are at baseline. Any chest pain? No  Any leg numbness or tingling? No  Any leg swelling? No  Any vision changes? No      HISTORY:  Patient's medications, allergies, past medical, and social histories were reviewed and updated as appropriate.      CHART REVIEW  Health Maintenance   Topic Date Due    Breast cancer screen  03/13/2020    DTaP/Tdap/Td vaccine (2 - Td) 09/16/2020 (Originally 4/14/2014)    Shingles Vaccine (2 of 3) 09/16/2020 (Originally 12/6/2016)    Annual Wellness Visit (AWV)  09/16/2020    Potassium monitoring  09/16/2020    Creatinine monitoring  09/16/2020    DEXA (modify frequency per FRAX score)  Completed    Flu vaccine  Completed    Pneumococcal 65+ years Vaccine  Completed    Hepatitis A vaccine  Aged Out    Hepatitis B vaccine  Aged Out    Hib vaccine  Aged Out    Meningococcal (ACWY) vaccine  Aged Out     The 10-year ASCVD risk score (Vicky Lan., et al., 2013) is: 24.1%    Values used to calculate the score:      Age: 78 years      Sex: Female      Is Non- : No      Diabetic: No      Tobacco smoker: No      Systolic Blood Pressure: 295 mmHg      Is BP treated: No      HDL Cholesterol: 47 mg/dL      Total Final     Lab Results   Component Value Date    GLUCOSE 101 (H) 09/16/2019     Lab Results   Component Value Date     (H) 09/16/2019    K 4.5 09/16/2019    CREATININE 0.6 09/16/2019     Lab Results   Component Value Date    WBC 7.7 09/16/2019    HGB 11.6 (L) 09/16/2019    HCT 35.9 (L) 09/16/2019    MCV 87.4 09/16/2019     09/16/2019     Lab Results   Component Value Date    ALT 11 09/16/2019    AST 15 09/16/2019    ALKPHOS 99 09/16/2019    BILITOT 0.4 09/16/2019     TSH (uIU/mL)   Date Value   09/16/2019 1.13     Lab Results   Component Value Date    LABA1C 5.5 09/16/2019      Objective:   PHYSICAL EXAM:  Vitals (if available) 123/81, HR 58, Pulse ox 91%     GENERAL:   · well-developed, well-nourished, alert, no distress. EYES:   · External findings: lids and lashes normal and conjunctivae and sclerae normal  · Eyes: no periorbital cellulitis. HENT:   · Normocephalic, atraumatic  · External nose and ears appear normal  · Mucous membranes are moist  · Hearing grossly normal.     NECK: No visible masses  LUNGS:    · Respiratory effort normal.  · No visualized signs of difficulty breathing or respiratory distress  SKIN: warm and dry  · No significant exanthematous lesions or discoloration noted on facial skin  PSYCH:    · Alert and oriented, able to follow commands  · Normal reasoning, insight good  · Normal affect  · No memory disturbance noted  NEURO:   No Facial Asymmetry (Cranial nerve 7 motor function) (limited exam to video visit)      No gaze palsy      Assessment and Plan:      Diagnosis Orders   1. Essential hypertension     2. Mixed hyperlipidemia     3. Paroxysmal A-fib ([address]     4. Prediabetes     5. Generalized osteoarthritis     Plan below. INSTRUCTIONS  · NEXT APPOINTMENT: Please schedule check-up in 3 months. · Stable with current medications. - taking meds, no issues. Virtual Visit (video visit) encounter employed to address concerns as mentioned above.   A caregiver was

## 2020-06-10 RX ORDER — MECLIZINE HCL 12.5 MG/1
TABLET ORAL
Qty: 30 TABLET | Refills: 1 | Status: SHIPPED | OUTPATIENT
Start: 2020-06-10 | End: 2022-07-12 | Stop reason: SDUPTHER

## 2020-08-06 ENCOUNTER — OFFICE VISIT (OUTPATIENT)
Dept: FAMILY MEDICINE CLINIC | Age: 80
End: 2020-08-06
Payer: MEDICARE

## 2020-08-06 VITALS
WEIGHT: 210 LBS | TEMPERATURE: 97.1 F | DIASTOLIC BLOOD PRESSURE: 62 MMHG | SYSTOLIC BLOOD PRESSURE: 130 MMHG | BODY MASS INDEX: 37.21 KG/M2 | OXYGEN SATURATION: 97 % | HEART RATE: 89 BPM | HEIGHT: 63 IN | RESPIRATION RATE: 14 BRPM

## 2020-08-06 LAB — HBA1C MFR BLD: 5.8 %

## 2020-08-06 PROCEDURE — 1036F TOBACCO NON-USER: CPT | Performed by: FAMILY MEDICINE

## 2020-08-06 PROCEDURE — 3023F SPIROM DOC REV: CPT | Performed by: FAMILY MEDICINE

## 2020-08-06 PROCEDURE — G8427 DOCREV CUR MEDS BY ELIG CLIN: HCPCS | Performed by: FAMILY MEDICINE

## 2020-08-06 PROCEDURE — 1123F ACP DISCUSS/DSCN MKR DOCD: CPT | Performed by: FAMILY MEDICINE

## 2020-08-06 PROCEDURE — G8400 PT W/DXA NO RESULTS DOC: HCPCS | Performed by: FAMILY MEDICINE

## 2020-08-06 PROCEDURE — G8926 SPIRO NO PERF OR DOC: HCPCS | Performed by: FAMILY MEDICINE

## 2020-08-06 PROCEDURE — 1090F PRES/ABSN URINE INCON ASSESS: CPT | Performed by: FAMILY MEDICINE

## 2020-08-06 PROCEDURE — 99214 OFFICE O/P EST MOD 30 MIN: CPT | Performed by: FAMILY MEDICINE

## 2020-08-06 PROCEDURE — 83036 HEMOGLOBIN GLYCOSYLATED A1C: CPT | Performed by: FAMILY MEDICINE

## 2020-08-06 PROCEDURE — G8417 CALC BMI ABV UP PARAM F/U: HCPCS | Performed by: FAMILY MEDICINE

## 2020-08-06 PROCEDURE — 4040F PNEUMOC VAC/ADMIN/RCVD: CPT | Performed by: FAMILY MEDICINE

## 2020-08-06 RX ORDER — ATORVASTATIN CALCIUM 10 MG/1
TABLET, FILM COATED ORAL
COMMUNITY
Start: 2020-06-11

## 2020-08-06 ASSESSMENT — PATIENT HEALTH QUESTIONNAIRE - PHQ9
SUM OF ALL RESPONSES TO PHQ QUESTIONS 1-9: 0
SUM OF ALL RESPONSES TO PHQ9 QUESTIONS 1 & 2: 0
SUM OF ALL RESPONSES TO PHQ QUESTIONS 1-9: 0
1. LITTLE INTEREST OR PLEASURE IN DOING THINGS: 0
2. FEELING DOWN, DEPRESSED OR HOPELESS: 0

## 2020-08-06 NOTE — PROGRESS NOTES
Female      Is Non- : No      Diabetic: No      Tobacco smoker: No      Systolic Blood Pressure: 059 mmHg      Is BP treated: No      HDL Cholesterol: 47 mg/dL      Total Cholesterol: 160 mg/dL  Prior to Visit Medications    Medication Sig Taking?  Authorizing Provider   atorvastatin (LIPITOR) 10 MG tablet TK 1 T PO D Yes Historical Provider, MD   meclizine (ANTIVERT) 12.5 MG tablet TAKE 1/2 TO 1 TABLET BY MOUTH THREE TIMES DAILY AS NEEDED FOR DIZZINESS OR NAUSEA Yes Kane Leal MD   albuterol sulfate  (90 Base) MCG/ACT inhaler INHALE 2 PUFFS INTO THE LUNGS EVERY 4 HOURS AS NEEDED FOR WHEEZING Yes Kane Leal MD   rivaroxaban (XARELTO) 20 MG TABS tablet TAKE 1 TABLET BY MOUTH DAILY WITH BREAKFAST Yes Kane Leal MD   fluticasone-salmeterol (ADVAIR DISKUS) 250-50 MCG/DOSE AEPB Inhale 1 puff into the lungs 2 times daily Yes Kane Leal MD   omeprazole (PRILOSEC) 20 MG delayed release capsule Take 1 capsule by mouth Daily Yes Kane Leal MD   fluticasone (FLONASE) 50 MCG/ACT nasal spray 1 spray by Nasal route daily Yes Kane Leal MD   BIOTIN PO Take by mouth Yes Historical Provider, MD      Family History   Problem Relation Age of Onset    Diabetes Mother     Stroke Mother     Coronary Art Dis Mother     Coronary Art Dis Father     Diabetes Father     Breast Cancer Sister     Hypertension Sister     Hypertension Brother     Prostate Cancer Brother      Social History     Tobacco Use    Smoking status: Former Smoker     Packs/day: 0.25     Years: 20.00     Pack years: 5.00     Types: Cigarettes     Last attempt to quit: 2014     Years since quittin.9    Smokeless tobacco: Never Used    Tobacco comment: Advised not to resume   Substance Use Topics    Alcohol use: Yes     Comment: socially    Drug use: No      LAST LABS  Cholesterol, Total   Date Value Ref Range Status   2019 160 0 - 199 mg/dL Final     LDL Calculated   Date Value Ref Range Status 09/16/2019 78 <100 mg/dL Final     HDL   Date Value Ref Range Status   09/16/2019 47 40 - 60 mg/dL Final     Triglycerides   Date Value Ref Range Status   09/16/2019 177 (H) 0 - 150 mg/dL Final     Lab Results   Component Value Date    GLUCOSE 101 (H) 09/16/2019     Lab Results   Component Value Date     (H) 09/16/2019    K 4.5 09/16/2019    CREATININE 0.6 09/16/2019     Lab Results   Component Value Date    WBC 7.7 09/16/2019    HGB 11.6 (L) 09/16/2019    HCT 35.9 (L) 09/16/2019    MCV 87.4 09/16/2019     09/16/2019     Lab Results   Component Value Date    ALT 11 09/16/2019    AST 15 09/16/2019    ALKPHOS 99 09/16/2019    BILITOT 0.4 09/16/2019     TSH (uIU/mL)   Date Value   09/16/2019 1.13     Lab Results   Component Value Date    LABA1C 5.5 09/16/2019      Objective:   PHYSICAL EXAM   /62 (Site: Left Upper Arm, Position: Sitting, Cuff Size: Large Adult)   Pulse 89   Temp 97.1 °F (36.2 °C)   Resp 14   Ht 5' 3\" (1.6 m)   Wt 210 lb (95.3 kg)   SpO2 97%   BMI 37.20 kg/m²   BP Readings from Last 5 Encounters:   08/06/20 130/62   01/09/20 (!) 129/59   10/28/19 126/82   09/16/19 106/74   05/30/19 88/60     Wt Readings from Last 5 Encounters:   08/06/20 210 lb (95.3 kg)   01/09/20 206 lb 9.6 oz (93.7 kg)   10/28/19 207 lb (93.9 kg)   09/16/19 211 lb (95.7 kg)   05/30/19 204 lb (92.5 kg)      GENERAL:   · well-developed, well-nourished, alert, no distress. EYES:   · External findings: lids and lashes normal and conjunctivae and sclerae normal  LUNGS:    · Breathing unlabored  · clear to auscultation bilaterally and good air movement  CARDIOVASC:   · regular rate and rhythm, S1, S2 normal. No murmur, click, rub or gallop  · LEGS:  Lower extremity edema: none    SKIN: warm and dry  PSYCH:    · Alert and oriented  · Normal reasoning, insight good  · Facial expressions full, mood appropriate  · No memory disturbance noted     Assessment and Plan:      Diagnosis Orders   1.  Essential

## 2020-08-10 ENCOUNTER — PATIENT MESSAGE (OUTPATIENT)
Dept: FAMILY MEDICINE CLINIC | Age: 80
End: 2020-08-10

## 2020-08-10 NOTE — TELEPHONE ENCOUNTER
From: Rajwinder Murphy  To: Kenny Newman MD  Sent: 8/10/2020 9:59 AM EDT  Subject: Test Results Question    Dr. Helena Parkinsonverly,  Please explain the results from that hemoglobin test that  was given to me on Aug. 6 office visit. I can't seem to find it  on my chart.   Thank [place]

## 2020-08-31 RX ORDER — PROPRANOLOL HYDROCHLORIDE 160 MG/1
CAPSULE, EXTENDED RELEASE ORAL
Qty: 90 CAPSULE | Refills: 1 | OUTPATIENT
Start: 2020-08-31

## 2020-09-04 ENCOUNTER — TELEPHONE (OUTPATIENT)
Dept: FAMILY MEDICINE CLINIC | Age: 80
End: 2020-09-04

## 2020-09-04 DIAGNOSIS — I10 ESSENTIAL HYPERTENSION: ICD-10-CM

## 2020-09-04 DIAGNOSIS — I48.0 PAROXYSMAL A-FIB (HCC): ICD-10-CM

## 2020-09-04 RX ORDER — PROPRANOLOL HYDROCHLORIDE 160 MG/1
CAPSULE, EXTENDED RELEASE ORAL
Qty: 90 CAPSULE | Refills: 1 | Status: SHIPPED | OUTPATIENT
Start: 2020-09-04 | End: 2021-03-01

## 2020-10-26 ENCOUNTER — PATIENT MESSAGE (OUTPATIENT)
Dept: FAMILY MEDICINE CLINIC | Age: 80
End: 2020-10-26

## 2020-11-16 LAB
A/G RATIO: 1.4 (ref 1.1–2.2)
ALBUMIN SERPL-MCNC: 3.7 G/DL (ref 3.4–5)
ALP BLD-CCNC: 106 U/L (ref 40–129)
ALT SERPL-CCNC: 10 U/L (ref 10–40)
ANION GAP SERPL CALCULATED.3IONS-SCNC: 11 MMOL/L (ref 3–16)
AST SERPL-CCNC: 14 U/L (ref 15–37)
BILIRUB SERPL-MCNC: 0.5 MG/DL (ref 0–1)
BUN BLDV-MCNC: 11 MG/DL (ref 7–20)
CALCIUM SERPL-MCNC: 9 MG/DL (ref 8.3–10.6)
CHLORIDE BLD-SCNC: 103 MMOL/L (ref 99–110)
CHOLESTEROL, TOTAL: 128 MG/DL (ref 0–199)
CO2: 27 MMOL/L (ref 21–32)
CREAT SERPL-MCNC: 0.6 MG/DL (ref 0.6–1.2)
GFR AFRICAN AMERICAN: >60
GFR NON-AFRICAN AMERICAN: >60
GLOBULIN: 2.7 G/DL
GLUCOSE BLD-MCNC: 98 MG/DL (ref 70–99)
HDLC SERPL-MCNC: 38 MG/DL (ref 40–60)
LDL CHOLESTEROL CALCULATED: 62 MG/DL
POTASSIUM SERPL-SCNC: 4.3 MMOL/L (ref 3.5–5.1)
SODIUM BLD-SCNC: 141 MMOL/L (ref 136–145)
TOTAL PROTEIN: 6.4 G/DL (ref 6.4–8.2)
TRIGL SERPL-MCNC: 138 MG/DL (ref 0–150)
VLDLC SERPL CALC-MCNC: 28 MG/DL

## 2021-02-12 ENCOUNTER — TELEMEDICINE (OUTPATIENT)
Dept: FAMILY MEDICINE CLINIC | Age: 81
End: 2021-02-12
Payer: MEDICARE

## 2021-02-12 VITALS
DIASTOLIC BLOOD PRESSURE: 85 MMHG | HEART RATE: 79 BPM | SYSTOLIC BLOOD PRESSURE: 130 MMHG | BODY MASS INDEX: 36.67 KG/M2 | WEIGHT: 207 LBS | TEMPERATURE: 97.3 F | OXYGEN SATURATION: 97 %

## 2021-02-12 DIAGNOSIS — G25.0 BENIGN ESSENTIAL TREMOR: ICD-10-CM

## 2021-02-12 DIAGNOSIS — Z00.00 ROUTINE GENERAL MEDICAL EXAMINATION AT A HEALTH CARE FACILITY: Primary | ICD-10-CM

## 2021-02-12 DIAGNOSIS — I10 ESSENTIAL HYPERTENSION: Chronic | ICD-10-CM

## 2021-02-12 DIAGNOSIS — Z12.31 OTHER SCREENING MAMMOGRAM: ICD-10-CM

## 2021-02-12 PROCEDURE — G0439 PPPS, SUBSEQ VISIT: HCPCS | Performed by: FAMILY MEDICINE

## 2021-02-12 PROCEDURE — 4040F PNEUMOC VAC/ADMIN/RCVD: CPT | Performed by: FAMILY MEDICINE

## 2021-02-12 PROCEDURE — G8482 FLU IMMUNIZE ORDER/ADMIN: HCPCS | Performed by: FAMILY MEDICINE

## 2021-02-12 PROCEDURE — 1123F ACP DISCUSS/DSCN MKR DOCD: CPT | Performed by: FAMILY MEDICINE

## 2021-02-12 SDOH — ECONOMIC STABILITY: INCOME INSECURITY: HOW HARD IS IT FOR YOU TO PAY FOR THE VERY BASICS LIKE FOOD, HOUSING, MEDICAL CARE, AND HEATING?: NOT HARD AT ALL

## 2021-02-12 ASSESSMENT — PATIENT HEALTH QUESTIONNAIRE - PHQ9
1. LITTLE INTEREST OR PLEASURE IN DOING THINGS: 0
SUM OF ALL RESPONSES TO PHQ QUESTIONS 1-9: 0
SUM OF ALL RESPONSES TO PHQ QUESTIONS 1-9: 0
SUM OF ALL RESPONSES TO PHQ9 QUESTIONS 1 & 2: 0

## 2021-02-12 NOTE — PROGRESS NOTES
PHONE VISIT    Gerald Rich is a [de-identified] y.o. female evaluated via telephone on 2/12/2021    Consent:  She and/or health care decision maker is aware that that she may receive a bill for this telephone service, depending on her insurance coverage, and has provided verbal consent to proceed: Yes    I affirm this is a Patient Initiated Episode with an Established Patient who has not had a related appointment within my department in the past 7 days or scheduled within the next 24 hours. Medicare Annual Wellness Visit  Name: Gerald Rich  YOB: 1940  Age: [de-identified] y.o. Sex: female  MRN: [id number]     Date of Service:  2/12/2021    Chief Complaint:   Gerald Rich is a [de-identified] y.o. female who presents for Medicare Annual Wellness Visit and check-up for:  1. Routine general medical examination at a health care facility    2. Other screening mammogram    3. Benign essential tremor    4. Essential hypertension      HPI    Chief Complaint   Patient presents with    Medicare AWV   Complaints: pt is doing well no new issues   Getting second COVID shot March 18. Hand is shaking a little more so hard to eat soup. Using depends because of leakage. Review of Systems   General ROS: fever? No,    Night sweats? No  Ophthalmic ROS: change in vision? No  Endocrine ROS: fatigue? No   Unexpected weight changes? No  Hematologic/Lymphatic: easy bruising? No   Swollen lymph nodes? No  ENT ROS: headaches? No   Sore throat? No  Respiratory ROS: cough? No   Wheezing? No  Cardiovascular ROS: chest pain? No   Shortness of breath? No  Gastrointestinal ROS: abdominal pain? No   Change in stools? No  Genito-Urinary ROS: painful urination? No   Trouble urinating? No  Musculoskeletal ROS: trouble walking? No   Joint pain? No  Neurological ROS: TIA or stroke symptoms? No   Numbness/tingling? No  Dermatological ROS: rash? No   Changes in skin spots?   No    Health Maintenance Due   Topic Date Due  DTaP/Tdap/Td vaccine (2 - Td) 04/14/2014    Shingles Vaccine (2 of 3) 12/06/2016    Annual Wellness Visit (AWV)  05/29/2019    Breast cancer screen  03/13/2020     HISTORY:  Patient's medications, allergies, past medical, and social histories were reviewed and updated as appropriate. CHART REVIEW  Health Maintenance   Topic Date Due    DTaP/Tdap/Td vaccine (2 - Td) 04/14/2014    Shingles Vaccine (2 of 3) 12/06/2016    Annual Wellness Visit (AWV)  05/29/2019    Breast cancer screen  03/13/2020    COVID-19 Vaccine (2 of 2 - Moderna series) 02/18/2021    Lipid screen  11/16/2021    Potassium monitoring  11/16/2021    Creatinine monitoring  11/16/2021    DEXA (modify frequency per FRAX score)  Completed    Flu vaccine  Completed    Pneumococcal 65+ years Vaccine  Completed    Hepatitis A vaccine  Aged Out    Hepatitis B vaccine  Aged Out    Hib vaccine  Aged Out    Meningococcal (ACWY) vaccine  Aged Out     The ASCVD Risk score (Tk Wharton, et al., 2013) failed to calculate for the following reasons: The 2013 ASCVD risk score is only valid for ages 36 to 78  Prior to Visit Medications    Medication Sig Taking?  Authorizing Provider   propranolol (INDERAL LA) 160 MG extended release capsule TAKE ONE CAPSULE BY MOUTH EVERY DAY Yes Masha Bush MD   atorvastatin (LIPITOR) 10 MG tablet TK 1 T PO D Yes Historical Provider, MD   meclizine (ANTIVERT) 12.5 MG tablet TAKE 1/2 TO 1 TABLET BY MOUTH THREE TIMES DAILY AS NEEDED FOR DIZZINESS OR NAUSEA Yes Masha uBsh MD   albuterol sulfate  (90 Base) MCG/ACT inhaler INHALE 2 PUFFS INTO THE LUNGS EVERY 4 HOURS AS NEEDED FOR WHEEZING Yes Masha Bush MD   rivaroxaban (XARELTO) 20 MG TABS tablet TAKE 1 TABLET BY MOUTH DAILY WITH BREAKFAST Yes Masha Bush MD   fluticasone-salmeterol (ADVAIR DISKUS) 250-50 MCG/DOSE AEPB Inhale 1 puff into the lungs 2 times daily Yes Masha Bush MD omeprazole (PRILOSEC) 20 MG delayed release capsule Take 1 capsule by mouth Daily Yes Jonathan Murray MD   fluticasone (FLONASE) 50 MCG/ACT nasal spray 1 spray by Nasal route daily Yes Jonathan Murray MD   BIOTIN PO Take by mouth Yes Historical Provider, MD      Family History   Problem Relation Age of Onset    Diabetes Mother     Stroke Mother     Coronary Art Dis Mother     Coronary Art Dis Father     Diabetes Father     Breast Cancer Sister     Hypertension Sister     Hypertension Brother     Prostate Cancer Brother      Social History     Tobacco Use    Smoking status: Former Smoker     Packs/day: 0.25     Years: 20.00     Pack years: 5.00     Types: Cigarettes     Quit date: 2014     Years since quittin.4    Smokeless tobacco: Never Used    Tobacco comment: Advised not to resume   Substance Use Topics    Alcohol use: Yes     Comment: socially    Drug use: No      Immunization History   Administered Date(s) Administered    COVID-19, Moderna, 100mcg/0.5ml 2021    Influenza Virus Vaccine 10/06/2011, 2014, 10/01/2015, 10/11/2016    Influenza Whole 10/01/2015    Influenza, High Dose (Fluzone 65 yrs and older) 2014, 10/11/2016, 2017, 2018, 10/15/2020    Influenza, Intradermal, Preservative free 2013    Influenza, Quadv, IM, PF (6 mo and older Fluzone, Flulaval, Fluarix, and 3 yrs and older Afluria) 10/21/2015    Influenza, Triv, inactivated, subunit, adjuvanted, IM (Fluad 65 yrs and older) 10/28/2019    Pneumococcal Conjugate 13-valent ([id number]) 2017    Pneumococcal Polysaccharide ([id number]) 10/01/2005, 2012    Tdap (Boostrix, Adacel) 2004    Tetanus 2015    Tetanus Toxoid, absorbed 2015    Zoster Live (Zostavax) 10/11/2016     LAST LABS  Cholesterol, Total   Date Value Ref Range Status   2020 128 0 - 199 mg/dL Final     LDL Calculated   Date Value Ref Range Status   2020 62 <100 mg/dL Final     HDL Date Value Ref Range Status   11/16/2020 38 (L) 40 - 60 mg/dL Final     Triglycerides   Date Value Ref Range Status   11/16/2020 138 0 - 150 mg/dL Final     Lab Results   Component Value Date    GLUCOSE 98 11/16/2020     Lab Results   Component Value Date     11/16/2020    K 4.3 11/16/2020    CREATININE 0.6 11/16/2020     Lab Results   Component Value Date    WBC 7.7 09/16/2019    HGB 11.6 (L) 09/16/2019    HCT 35.9 (L) 09/16/2019    MCV 87.4 09/16/2019     09/16/2019     Lab Results   Component Value Date    ALT 10 11/16/2020    AST 14 (L) 11/16/2020    ALKPHOS 106 11/16/2020    BILITOT 0.5 11/16/2020     TSH (uIU/mL)   Date Value   09/16/2019 1.13     Lab Results   Component Value Date    LABA1C 5.8 08/06/2020      CareTeam (Including outside providers/suppliers regularly involved in providing care):   Patient Care Team:  Shanti Norris MD as PCP - Rocio Schaeffer MD as PCP - [facility] Provider  Ruiz Foley MD as Consulting Physician (Orthopedic Surgery)  Darius Chapin MD as Consulting Physician (Urology)  Floyd Smith as Consulting Physician (Internal Medicine)  Perla Potts MD as Consulting Physician (Otolaryngology)  The following problems were reviewed today and where indicated follow up appointments were made and/or referrals ordered. Positive Risk Factor Screenings with Interventions:         General Health and ACP:  General  In general, how would you say your health is?: Good  In the past 7 days, have you experienced any of the following?  New or Increased Pain, New or Increased Fatigue, Loneliness, Social Isolation, Stress or Anger?: None of These  Do you get the social and emotional support that you need?: Yes  Do you have a Living Will?: (!) No  Advance Directives     Power of [address] Will ACP-Advance Directive ACP-Power of     Not on File Not on File Not on File Not on File      General Health Risk Interventions: · No Living Will: Advance Care Planning addressed with patient today   · Will work on getting it done    Health Habits/Nutrition:  Health Habits/Nutrition  Do you exercise for at least 20 minutes 2-3 times per week?: (!) No  Have you lost any weight without trying in the past 3 months?: No  Do you eat only one meal per day?: No  Have you seen the dentist within the past year?: (!) No     Health Habits/Nutrition Interventions:  · Inadequate physical activity:  patient is not ready to increase his/her physical activity level at this time, some activity in home  · Dental exam overdue:  patient encouraged to make appointment with his/her dentist      Hearing/Vision:  No exam data present  Hearing/Vision  Do you or your family notice any trouble with your hearing that hasn't been managed with hearing aids?: No  Do you have difficulty driving, watching TV, or doing any of your daily activities because of your eyesight?: No  Have you had an eye exam within the past year?: (!) No  Hearing/Vision Interventions:  · Vision concerns:  patient encouraged to make appointment with his/her eye specialist    Current Health Maintenance Status  Recommendations for Preventive Services Due: see orders. Recommended screening schedule for the next 5-10 years is provided to the patient in written form: see Patient Instructions/AVS.    PHYSICAL EXAM:   Objective:   PHYSICAL EXAM:  Vitals (if available)        Assessment and Plan:      Diagnosis Orders   1. Routine general medical examination at a health care facility     2. Other screening mammogram  [facility] DIGITAL SCREEN W OR WO CAD BILATERAL   3. Benign essential tremor     4. Essential hypertension     Stable. Plan as above and below. INSTRUCTIONS  NEXT APPOINTMENT: Please schedule check-up in 6 months.     Get seconf COVID in [place] I affirm this is a Patient Initiated Episode with an Established Patient who has not had a related appointment within my department in the past 7 days or scheduled within the next 24 hours.     Total Time: minutes: 11-20 minutes    Note: not billable if this call serves to triage the patient into an appointment for the relevant concern    --Altagracia Quiros MD on 2/12/2021 at 11:32 AM    An electronic signature was used to authenticate this note

## 2021-02-12 NOTE — PATIENT INSTRUCTIONS
Personalized Preventive Plan for [place]  - 2/12/2021  Medicare offers a range of preventive health benefits. Some of the tests and screenings are paid in full while other may be subject to a deductible, co-insurance, and/or copay. Some of these benefits include a comprehensive review of your medical history including lifestyle, illnesses that may run in your family, and various assessments and screenings as appropriate. After reviewing your medical record and screening and assessments performed today your provider may have ordered immunizations, labs, imaging, and/or referrals for you. A list of these orders (if applicable) as well as your Preventive Care list are included within your After Visit Summary for your review. Other Preventive Recommendations:    · A preventive eye exam performed by an eye specialist is recommended every 1-2 years to screen for glaucoma; cataracts, macular degeneration, and other eye disorders. · A preventive dental visit is recommended every 6 months. · Try to get at least 150 minutes of exercise per week or 10,000 steps per day on a pedometer . · Order or download the FREE \"Exercise & Physical Activity: Your Everyday Guide\" from [employer]. Call 5-533.674.9962 or search [employer] online. · You need 1852-0999 mg of calcium and 0931-7622 IU of vitamin D per day. It is possible to meet your calcium requirement with diet alone, but a vitamin D supplement is usually necessary to meet this goal.  · When exposed to the sun, use a sunscreen that protects against both UVA and UVB radiation with an SPF of 30 or greater. Reapply every 2 to 3 hours or after sweating, drying off with a towel, or swimming. · Always wear a seat belt when traveling in a car. Always wear a helmet when riding a bicycle or motorcycle.

## 2021-02-27 DIAGNOSIS — I48.0 PAROXYSMAL A-FIB (HCC): ICD-10-CM

## 2021-02-27 DIAGNOSIS — I10 ESSENTIAL HYPERTENSION: ICD-10-CM

## 2021-03-01 RX ORDER — PROPRANOLOL HYDROCHLORIDE 160 MG/1
CAPSULE, EXTENDED RELEASE ORAL
Qty: 90 CAPSULE | Refills: 1 | Status: SHIPPED | OUTPATIENT
Start: 2021-03-01 | End: 2021-08-26

## 2021-07-06 ENCOUNTER — TELEPHONE (OUTPATIENT)
Dept: FAMILY MEDICINE CLINIC | Age: 81
End: 2021-07-06

## 2021-07-06 DIAGNOSIS — G89.29 CHRONIC BILATERAL LOW BACK PAIN WITHOUT SCIATICA: ICD-10-CM

## 2021-07-06 DIAGNOSIS — M54.50 CHRONIC BILATERAL LOW BACK PAIN WITHOUT SCIATICA: ICD-10-CM

## 2021-07-06 RX ORDER — TIZANIDINE 4 MG/1
4 TABLET ORAL NIGHTLY PRN
Qty: 90 TABLET | Refills: 1 | Status: SHIPPED | OUTPATIENT
Start: 2021-07-06 | End: 2022-03-16

## 2021-07-06 NOTE — TELEPHONE ENCOUNTER
Cintia Call calling from [employer] needing clarification on directions for tizanidine. Sent to [facility] [employer] on 7/01  Two sets of directions on prescription.    Please advise

## 2021-07-07 NOTE — TELEPHONE ENCOUNTER
Called and spoke with pt she wanted to make sure we were ok to fill her RX I told pt to try and not use it very often

## 2021-07-07 NOTE — TELEPHONE ENCOUNTER
Pt called in to verify that her prescription was sent over to [facility]. I advised her that it was sent over yesterday, however she would like to talk to [facility] about this.     Please Advise

## 2021-08-12 ENCOUNTER — OFFICE VISIT (OUTPATIENT)
Dept: FAMILY MEDICINE CLINIC | Age: 81
End: 2021-08-12
Payer: MEDICARE

## 2021-08-12 VITALS
DIASTOLIC BLOOD PRESSURE: 82 MMHG | HEIGHT: 63 IN | BODY MASS INDEX: 38.48 KG/M2 | SYSTOLIC BLOOD PRESSURE: 126 MMHG | TEMPERATURE: 97.8 F | RESPIRATION RATE: 16 BRPM | HEART RATE: 64 BPM | WEIGHT: 217.2 LBS | OXYGEN SATURATION: 97 %

## 2021-08-12 DIAGNOSIS — I10 ESSENTIAL HYPERTENSION: Primary | Chronic | ICD-10-CM

## 2021-08-12 DIAGNOSIS — I48.0 PAROXYSMAL A-FIB (HCC): ICD-10-CM

## 2021-08-12 DIAGNOSIS — R73.03 PREDIABETES: ICD-10-CM

## 2021-08-12 DIAGNOSIS — Z12.31 OTHER SCREENING MAMMOGRAM: ICD-10-CM

## 2021-08-12 DIAGNOSIS — E78.2 MIXED HYPERLIPIDEMIA: ICD-10-CM

## 2021-08-12 DIAGNOSIS — J45.20 MILD INTERMITTENT ASTHMATIC BRONCHITIS WITHOUT COMPLICATION: ICD-10-CM

## 2021-08-12 DIAGNOSIS — J44.9 CHRONIC OBSTRUCTIVE PULMONARY DISEASE, UNSPECIFIED COPD TYPE (HCC): ICD-10-CM

## 2021-08-12 DIAGNOSIS — E66.01 SEVERE OBESITY (BMI 35.0-35.9 WITH COMORBIDITY) (HCC): ICD-10-CM

## 2021-08-12 PROCEDURE — 3023F SPIROM DOC REV: CPT | Performed by: FAMILY MEDICINE

## 2021-08-12 PROCEDURE — 1036F TOBACCO NON-USER: CPT | Performed by: FAMILY MEDICINE

## 2021-08-12 PROCEDURE — G8417 CALC BMI ABV UP PARAM F/U: HCPCS | Performed by: FAMILY MEDICINE

## 2021-08-12 PROCEDURE — 4040F PNEUMOC VAC/ADMIN/RCVD: CPT | Performed by: FAMILY MEDICINE

## 2021-08-12 PROCEDURE — 1090F PRES/ABSN URINE INCON ASSESS: CPT | Performed by: FAMILY MEDICINE

## 2021-08-12 PROCEDURE — G8427 DOCREV CUR MEDS BY ELIG CLIN: HCPCS | Performed by: FAMILY MEDICINE

## 2021-08-12 PROCEDURE — G8926 SPIRO NO PERF OR DOC: HCPCS | Performed by: FAMILY MEDICINE

## 2021-08-12 PROCEDURE — 99214 OFFICE O/P EST MOD 30 MIN: CPT | Performed by: FAMILY MEDICINE

## 2021-08-12 PROCEDURE — G8400 PT W/DXA NO RESULTS DOC: HCPCS | Performed by: FAMILY MEDICINE

## 2021-08-12 PROCEDURE — 1123F ACP DISCUSS/DSCN MKR DOCD: CPT | Performed by: FAMILY MEDICINE

## 2021-08-12 RX ORDER — HYDROCHLOROTHIAZIDE 25 MG/1
25 TABLET ORAL DAILY
COMMUNITY

## 2021-08-12 RX ORDER — ALBUTEROL SULFATE 90 UG/1
AEROSOL, METERED RESPIRATORY (INHALATION)
Qty: 6.7 G | Refills: 3 | Status: SHIPPED | OUTPATIENT
Start: 2021-08-12

## 2021-08-12 NOTE — PROGRESS NOTES
CARDIOVASCULAR VISIT NOTE   Subjective:   HPI CHRONIC:   Chief Complaint   Patient presents with    Hypertension     had a couple days in early july that her bp was high. had tests with cardiology and results were good. bp has been good since      Patient here for follow-up of multiple chronic conditions includin. Essential hypertension    2. Chronic obstructive pulmonary disease, unspecified COPD type (HCC)    3. Paroxysmal A-fib ([address]    4. Prediabetes    5. Mixed hyperlipidemia    6. Mild intermittent asthmatic bronchitis without complication    7. Severe obesity (BMI 35.0-35.9 with comorbidity) ([facility])      Complaints: SBP got over 200 in July. Cardiology added HCTZ  · Taking medicines daily as directed? Yes  · Any side effects of medications? No  · Using inhaler infrequently. Just used up one that is a year old    Review of Systems   Respiratory ROS: cough? No   Wheezing? No  Cardiovascular ROS: chest pain? No   Shortness of breath? No    Health Maintenance Due   Topic Date Due    DTaP/Tdap/Td vaccine (2 - Td or Tdap) 2014    Shingles Vaccine (2 of 3) 2016    Breast cancer screen  2020     CHART REVIEW   reports that she quit smoking about 6 years ago. Her smoking use included cigarettes. She has a 5.00 pack-year smoking history.  She has never used smokeless tobacco.  Health Maintenance Due   Topic Date Due    DTaP/Tdap/Td vaccine (2 - Td or Tdap) 2014    Shingles Vaccine (2 of 3) 2016    Breast cancer screen  2020     Current Outpatient Medications   Medication Instructions    albuterol sulfate  (90 Base) MCG/ACT inhaler INHALE 2 PUFFS INTO THE LUNGS EVERY 4 HOURS AS NEEDED FOR WHEEZING    atorvastatin (LIPITOR) 10 MG tablet TK 1 T PO D    BIOTIN PO Oral    fluticasone (FLONASE) 50 MCG/ACT nasal spray 1 spray, Nasal, DAILY    hydroCHLOROthiazide (HYDRODIURIL) 25 mg, Oral, EVERY OTHER DAY    meclizine (ANTIVERT) 12.5 MG tablet TAKE 1/2 TO 1 TABLET BY MOUTH THREE TIMES DAILY AS NEEDED FOR DIZZINESS OR NAUSEA    omeprazole (PRILOSEC) 20 mg, Oral, DAILY    propranolol (INDERAL LA) 160 MG extended release capsule TAKE 1 CAPSULE BY MOUTH EVERY DAY    rivaroxaban (XARELTO) 20 MG TABS tablet TAKE 1 TABLET BY MOUTH DAILY WITH BREAKFAST    tiZANidine (ZANAFLEX) 4 mg, Oral, NIGHTLY PRN     LAST LABS  LDL Calculated   Date Value Ref Range Status   11/16/2020 62 <100 mg/dL Final     Lab Results   Component Value Date    HDL 38 (L) 11/16/2020     Lab Results   Component Value Date    TRIG 138 11/16/2020     Lab Results   Component Value Date     11/16/2020    K 4.3 11/16/2020    CREATININE 0.6 11/16/2020     Lab Results   Component Value Date    WBC 7.7 09/16/2019    HGB 11.6 (L) 09/16/2019     09/16/2019     Lab Results   Component Value Date    ALT 10 11/16/2020    AST 14 (L) 11/16/2020    ALKPHOS 106 11/16/2020    BILITOT 0.5 11/16/2020     TSH (uIU/mL)   Date Value   09/16/2019 1.13     Lab Results   Component Value Date    LABA1C 5.8 08/06/2020    LABA1C 5.5 09/16/2019    LABA1C 5.6 09/12/2018     Objective:   PHYSICAL EXAM   /82 (Site: Right Upper Arm, Position: Sitting, Cuff Size: Large Adult)   Pulse 64   Temp 97.8 °F (36.6 °C) (Oral)   Resp 16   Ht 5' 3\" (1.6 m)   Wt 217 lb 3.2 oz (98.5 kg)   SpO2 97%   BMI 38.48 kg/m²   BP Readings from Last 5 Encounters:   08/12/21 126/82   02/12/21 130/85   08/06/20 130/62   01/09/20 (!) 129/59   10/28/19 126/82     Wt Readings from Last 5 Encounters:   08/12/21 217 lb 3.2 oz (98.5 kg)   02/12/21 207 lb (93.9 kg)   08/06/20 210 lb (95.3 kg)   01/09/20 206 lb 9.6 oz (93.7 kg)   10/28/19 207 lb (93.9 kg)      GENERAL:   · well-developed, well-nourished, alert, no distress.      LUNGS:    · Breathing unlabored  · clear to auscultation bilaterally and good air movement  CARDIOVASC:   · regular rate and rhythm  · LEGS:  Lower extremity edema: none    SKIN: warm and dry     Assessment and Plan: Diagnosis Orders   1. Essential hypertension     2. Chronic obstructive pulmonary disease, unspecified COPD type (HCC)     3. Paroxysmal A-fib ([address]     4. Prediabetes     5. Mixed hyperlipidemia     6. Mild intermittent asthmatic bronchitis without complication  albuterol sulfate  (90 Base) MCG/ACT inhaler   7. Severe obesity (BMI 35.0-35.9 with comorbidity) (HCC)  Gained, discussed diet and activity   Stable     Continue current Tx plan. Any changes marked below. INSTRUCTIONS  NEXT APPOINTMENT: Please schedule fasting annual physical (30 minutes) in 6 months. OK to have water, black coffee and medications (except for diabetes medicines). · PLEASE TAKE THIS FORM TO CHECK-OUT WINDOW TO SCHEDULE NEXT VISIT. · If possible, it would be good idea to get blood work 2-10 working days BEFORE next visit. This way we can discuss results. HOWEVER, if having any new symptoms please wait until seen in case other tests are needed. · Please get flu vaccine when available in fall. Can get either at this office or at stores such as [employer] and [email or web address]. · Please get mammogram soon to screen for breast cancer. To schedule at a [facility], please call [phone]. · Eat less, move more! You can do it! · Get PFT (pulmonary function test).

## 2021-08-12 NOTE — PATIENT INSTRUCTIONS
INSTRUCTIONS  NEXT APPOINTMENT: Please schedule fasting annual physical (30 minutes) in 6 months. OK to have water, black coffee and medications (except for diabetes medicines). · PLEASE TAKE THIS FORM TO CHECK-OUT WINDOW TO SCHEDULE NEXT VISIT. · If possible, it would be good idea to get blood work 2-10 working days BEFORE next visit. This way we can discuss results. HOWEVER, if having any new symptoms please wait until seen in case other tests are needed. · Please get flu vaccine when available in fall. Can get either at this office or at stores such as [employer] and [address]. · Please get mammogram soon to screen for breast cancer. To schedule at a [facility], please call [phone]. · Eat less, move more! You can do it! · Get PFT (pulmonary function test).

## 2021-08-26 DIAGNOSIS — I10 ESSENTIAL HYPERTENSION: ICD-10-CM

## 2021-08-26 DIAGNOSIS — I48.0 PAROXYSMAL A-FIB (HCC): ICD-10-CM

## 2021-08-26 RX ORDER — PROPRANOLOL HYDROCHLORIDE 160 MG/1
CAPSULE, EXTENDED RELEASE ORAL
Qty: 90 CAPSULE | Refills: 1 | Status: SHIPPED | OUTPATIENT
Start: 2021-08-26 | End: 2022-02-22

## 2021-09-01 ENCOUNTER — HOSPITAL ENCOUNTER (OUTPATIENT)
Dept: PULMONOLOGY | Age: 81
Discharge: HOME | End: 2021-09-01
Payer: MEDICARE

## 2021-09-01 DIAGNOSIS — J44.9 CHRONIC OBSTRUCTIVE PULMONARY DISEASE, UNSPECIFIED COPD TYPE (HCC): ICD-10-CM

## 2021-09-01 LAB
DLCO %PRED: 54 %
DLCO PRED: NORMAL
DLCO/VA %PRED: NORMAL
DLCO/VA PRED: NORMAL
DLCO/VA: NORMAL
DLCO: NORMAL
EXPIRATORY TIME-POST: NORMAL
EXPIRATORY TIME: NORMAL
FEF 25-75% %CHNG: NORMAL
FEF 25-75% %PRED-POST: NORMAL
FEF 25-75% %PRED-PRE: NORMAL
FEF 25-75% PRED: NORMAL
FEF 25-75%-POST: NORMAL
FEF 25-75%-PRE: NORMAL
FEV1 %PRED-POST: 66 %
FEV1 %PRED-PRE: 65 %
FEV1 PRED: NORMAL
FEV1-POST: NORMAL
FEV1-PRE: NORMAL
FEV1/FVC %PRED-POST: NORMAL
FEV1/FVC %PRED-PRE: NORMAL
FEV1/FVC PRED: NORMAL
FEV1/FVC-POST: 94 %
FEV1/FVC-PRE: 93 %
FVC %PRED-POST: NORMAL
FVC %PRED-PRE: NORMAL
FVC PRED: NORMAL
FVC-POST: NORMAL
FVC-PRE: NORMAL
GAW %PRED: NORMAL
GAW PRED: NORMAL
GAW: NORMAL
IC %PRED: NORMAL
IC PRED: NORMAL
IC: NORMAL
MEP: NORMAL
MIP: NORMAL
MVV %PRED-PRE: NORMAL
MVV PRED: NORMAL
MVV-PRE: NORMAL
PEF %PRED-POST: NORMAL
PEF %PRED-PRE: NORMAL
PEF PRED: NORMAL
PEF%CHNG: NORMAL
PEF-POST: NORMAL
PEF-PRE: NORMAL
RAW %PRED: NORMAL
RAW PRED: NORMAL
RAW: NORMAL
RV %PRED: NORMAL
RV PRED: NORMAL
RV: NORMAL
SVC %PRED: NORMAL
SVC PRED: NORMAL
SVC: NORMAL
TLC %PRED: 59 %
TLC PRED: NORMAL
TLC: NORMAL
VA %PRED: NORMAL
VA PRED: NORMAL
VA: NORMAL
VTG %PRED: NORMAL
VTG PRED: NORMAL
VTG: NORMAL

## 2021-09-01 PROCEDURE — 94726 PLETHYSMOGRAPHY LUNG VOLUMES: CPT | Performed by: INTERNAL MEDICINE

## 2021-09-01 PROCEDURE — 94060 EVALUATION OF WHEEZING: CPT | Performed by: INTERNAL MEDICINE

## 2021-09-01 PROCEDURE — 94729 DIFFUSING CAPACITY: CPT | Performed by: INTERNAL MEDICINE

## 2021-09-01 PROCEDURE — 94640 AIRWAY INHALATION TREATMENT: CPT

## 2021-09-01 PROCEDURE — 94726 PLETHYSMOGRAPHY LUNG VOLUMES: CPT

## 2021-09-01 PROCEDURE — 6370000000 HC RX 637 (ALT 250 FOR IP): Performed by: FAMILY MEDICINE

## 2021-09-01 PROCEDURE — 94729 DIFFUSING CAPACITY: CPT

## 2021-09-01 PROCEDURE — 94060 EVALUATION OF WHEEZING: CPT

## 2021-09-01 RX ORDER — ALBUTEROL SULFATE 90 UG/1
4 AEROSOL, METERED RESPIRATORY (INHALATION) ONCE
Status: COMPLETED | OUTPATIENT
Start: 2021-09-01 | End: 2021-09-01

## 2021-09-01 RX ADMIN — ALBUTEROL SULFATE 4 PUFF: 90 AEROSOL, METERED RESPIRATORY (INHALATION) at 09:44

## 2021-09-01 ASSESSMENT — PULMONARY FUNCTION TESTS
FEV1_PERCENT_PREDICTED_PRE: 65
FEV1/FVC_POST: 94
FEV1_PERCENT_PREDICTED_POST: 66
FEV1/FVC_PRE: 93

## 2021-09-01 NOTE — PROCEDURES
pirometry was acceptable and reproducible by ATS standards      Spirometry/Flow volume loop: There is no airflow obstruction or statistically significant postbronchodilator response. FEV1 1.19 L 65% predicted, FVC 1.66 L 69% predicted with a ratio of 72. Lung volumes:  Lung volumes showed mild to moderate degree of restrictive lung defect in a pattern consistent with parenchymal restriction. Diffusing capacity:  Moderately reduced diffusion capacity at 54% predicted. Impression:  There is no airflow obstruction or statistically significant postbronchodilator response. There is mild to moderate degree of restrictive lung defect in a pattern consistent with parenchymal restriction clinical and radiological correlation is advised. FEV1 %Pred-Post   Date Value Ref Range Status   09/01/2021 66 % Final     FEV1/FVC-Post   Date Value Ref Range Status   09/01/2021 94 % Final     TLC %Pred   Date Value Ref Range Status   09/01/2021 59 % Final           OBSTRUCTION % Predicted FEV1   MILD >70%   MODERATE 60-69%   MODERATELY-SEVERE 50-59%   SEVERE 35-49%   VERY SEVERE <35%         RESTRICTION % Predicted TLC   MILD 66-80%   MODERATE 54-65%   MODERATELY-SEVERE <54%                 DIFFUSION CAPACITY DLCO % Pred   MILD >60% AND < LLN   MODERATE 40-60%   SEVERE <40%       PFT data will be scanned into the media tab under this encounter. Please see the scanned data for numerical values.      Polo Youngblood MD  [facility], Sleep and Critical Care Medicine

## 2021-09-10 DIAGNOSIS — J98.4 RESTRICTIVE LUNG DISEASE: Primary | ICD-10-CM

## 2021-09-13 ENCOUNTER — TELEPHONE (OUTPATIENT)
Dept: FAMILY MEDICINE CLINIC | Age: 81
End: 2021-09-13

## 2021-09-13 NOTE — TELEPHONE ENCOUNTER
Pt calling stating that she wants to see Dr. Dilcia Valera. Stated she is having stomach pain that has been going on for 4 days. Stated that she has not had any nausea or diarrhea. Stated that she is having pulmonology issues and thinks it could be related. Stated that she is taking tylenol and it seems to help but she wants checked out. Advised I could get her in with another provider sooner and Dr. Dilcia Valera is not in the office on Tuesdays. Looked and May[facility] is completely booked tomorrow. Stated she wants to talk with Major Wendie and Dr. Dilcia Valera to be squeezed in with Dr. Dilcia Valera.     Pt can be reached at [phone]

## 2021-09-14 NOTE — TELEPHONE ENCOUNTER
Pt scheduled weds at 4 pm   Pt stated she had a chest x ray done the end of June early July not done at [facility] her chest x ray was done at [facility] she stated she would get it done again but insurance may not cover FYI

## 2021-09-16 ENCOUNTER — OFFICE VISIT (OUTPATIENT)
Dept: FAMILY MEDICINE CLINIC | Age: 81
End: 2021-09-16
Payer: MEDICARE

## 2021-09-16 VITALS
RESPIRATION RATE: 16 BRPM | HEART RATE: 86 BPM | BODY MASS INDEX: 37.39 KG/M2 | OXYGEN SATURATION: 97 % | SYSTOLIC BLOOD PRESSURE: 122 MMHG | WEIGHT: 211 LBS | DIASTOLIC BLOOD PRESSURE: 84 MMHG | HEIGHT: 63 IN

## 2021-09-16 DIAGNOSIS — Z23 NEEDS FLU SHOT: ICD-10-CM

## 2021-09-16 DIAGNOSIS — J98.4 RESTRICTIVE LUNG DISEASE: ICD-10-CM

## 2021-09-16 DIAGNOSIS — K21.9 GASTROESOPHAGEAL REFLUX DISEASE WITHOUT ESOPHAGITIS: ICD-10-CM

## 2021-09-16 DIAGNOSIS — R10.11 RUQ ABDOMINAL PAIN: Primary | ICD-10-CM

## 2021-09-16 DIAGNOSIS — R10.13 EPIGASTRIC PAIN: ICD-10-CM

## 2021-09-16 PROCEDURE — G8427 DOCREV CUR MEDS BY ELIG CLIN: HCPCS | Performed by: FAMILY MEDICINE

## 2021-09-16 PROCEDURE — G8400 PT W/DXA NO RESULTS DOC: HCPCS | Performed by: FAMILY MEDICINE

## 2021-09-16 PROCEDURE — 1090F PRES/ABSN URINE INCON ASSESS: CPT | Performed by: FAMILY MEDICINE

## 2021-09-16 PROCEDURE — 1036F TOBACCO NON-USER: CPT | Performed by: FAMILY MEDICINE

## 2021-09-16 PROCEDURE — 90694 VACC AIIV4 NO PRSRV 0.5ML IM: CPT | Performed by: FAMILY MEDICINE

## 2021-09-16 PROCEDURE — G8417 CALC BMI ABV UP PARAM F/U: HCPCS | Performed by: FAMILY MEDICINE

## 2021-09-16 PROCEDURE — 99214 OFFICE O/P EST MOD 30 MIN: CPT | Performed by: FAMILY MEDICINE

## 2021-09-16 PROCEDURE — G0008 ADMIN INFLUENZA VIRUS VAC: HCPCS | Performed by: FAMILY MEDICINE

## 2021-09-16 PROCEDURE — 4040F PNEUMOC VAC/ADMIN/RCVD: CPT | Performed by: FAMILY MEDICINE

## 2021-09-16 PROCEDURE — 1123F ACP DISCUSS/DSCN MKR DOCD: CPT | Performed by: FAMILY MEDICINE

## 2021-09-16 RX ORDER — OMEPRAZOLE 20 MG/1
20 CAPSULE, DELAYED RELEASE ORAL 2 TIMES DAILY
Qty: 60 CAPSULE | Refills: 11 | Status: SHIPPED | COMMUNITY
Start: 2021-09-16

## 2021-09-16 NOTE — PATIENT INSTRUCTIONS
INSTRUCTIONS  · PLEASE GET BLOODWORK DRAWN TODAY ON FIRST FLOOR in 170. Take orders with you. RESULTS- most blood tests back in couple days. We will call you if any problems. If bloodwork good, you will get letter in mail or notified thru [address] (if signed up) within 2 weeks. If you do not, please call office. · Get ultrasound of gall bladder. · Get CT of lung. See pulmonary. · INCREASE prilosec to 20 mg twice a day   · Will need to see surgeon if looks like gall bladder. Patient Education       GALLSTONES    Overview   What are gallstones? The gallbladder is an internal organ just under your liver. It stores the digestive fluids that are made by the liver. Sometimes these fluids become solid and form stones, called gallstones. Symptoms   What problems can gallstones cause? Most people who have gallstones never experience any symptoms. They might never even know they have gallstones. Sometimes, a gallstone can leave your gallbladder and go into the passageway from your gallbladder to your intestine. If a gallstone gets stuck in that passageway and blocks it completely, you will have severe pain in the right upper part of your belly. You may also feel pain in your upper back. The pain usually starts suddenly and lasts for several hours. This is known as a gallbladder attack. Complete or partial blockage can also cause your gallbladder to get irritated and inflamed. If this happens, you will usually have pain that lasts several hours. You may also develop a fever. Your skin may turn a yellowish color, known as jaundice (say \"ofelia-ronda\"). You may vomit or feel nauseous. Causes & Risk Factors   Who gets gallstones?   You're more likely to get gallstones if:  You are a woman   You are more than 61years of age   You are of [address] or [place] descent   You have diabetes   You have a family history of gallstones   You are pregnant   You are taking birth control pills   You eat a diet high in cholesterol   You are overweight or obese   You are on a low-calorie diet and have recently lost weight very quickly     Treatment   How are gallstones usually treated? You and your doctor will talk about your situation and decide what is right for you. If you have gallstones but no pain, chances are good that the stones won't be a problem for you. Your doctor might suggest you leave them alone. Once you have one gallbladder attack, the chance of having another one is high (about 70%). Many doctors will suggest surgery to remove your gallbladder to prevent a future attack. If your gallbladder is irritated or inflamed, most doctors will want to take it out right away. The surgery is safe and effective. Without surgery, the gallbladder can get infected. It might even burst open, causing further problems. Are there other treatments? Other treatments are available for people who would have a high risk in surgery because they are elderly or have heart problems or lung disease. However, gallstones usually return when they are not treated with surgery. Your doctor might be able to use sound wave therapy to break up the stones so they can move into the intestine without problem. However, not everyone can receive this treatment. If you have more than 1 gallstone, if your gallstone is large or if you have other medical conditions, you may not be able to receive sound wave therapy. You might also take a pill to dissolve the stones. This pill does not work for all people and can be very expensive. Surgery is still the best way to cure gallstones. Talk with your doctor about what is right for you.

## 2021-09-16 NOTE — PROGRESS NOTES
PROBLEM VISIT NOTE     Subjective:     Chief Complaint   Patient presents with    Abdominal Pain     Kristi Cain is a [de-identified] y.o. female   who presents pt has been having stomach pain bilateral upper x 15 minutes, she said it would hurt like a stomach and then it would burn epigastric. Starts right after eating. She said she started watching what she was eating. diverticulitis runs in her family. Can last 24 hours. She isn't sure if it's that or gall bladder. No diarrhea no nausea just stomach pain and burning and bloated. Duration x1 week. Already on daily PPI. Denies N. Tried she took tylenol and Tums and it helped with pain. Took mylanta and it didn't do anything     Patient's medications, allergies, past medical, and social histories were reviewed and updated as appropriate (see below). Review of Systems   General ROS: fever? No,    Night sweats? No  Endocrine ROS:malaise/lethargy? No   Unexpected weight changes? No  Respiratory ROS: cough? No   Shortness of breath? No  Cardiovascular ROS:chest pain? No   Shortness of breath with exertion? No  Gastrointestinal ROS: abdominal pain? Yes   Change in stools? No  Genito-Urinary ROS: painful urination? No   Trouble voiding? No  Neurological ROS: TIA or stroke symptoms? No   Numbness/tingling in feet? No    Health Maintenance Due   Topic Date Due    DTaP/Tdap/Td vaccine (2 - Td or Tdap) 04/14/2014    Shingles Vaccine (2 of 3) 12/06/2016    Breast cancer screen  03/13/2020    Flu vaccine (1) 09/01/2021     *Chief complaint, HPI, History and ROS provided by the medical assistant has been reviewed and verified by provider- Benita Martinez MD    CHART REVIEW   reports that she quit smoking about 7 years ago. Her smoking use included cigarettes. She has a 5.00 pack-year smoking history.  She has never used smokeless tobacco.  Health Maintenance Due   Topic Date Due    DTaP/Tdap/Td vaccine (2 - Td or Tdap) 04/14/2014    Shingles Vaccine (2 of 3) 12/06/2016  Breast cancer screen  03/13/2020    Flu vaccine (1) 09/01/2021     Current Outpatient Medications   Medication Instructions    albuterol sulfate  (90 Base) MCG/ACT inhaler INHALE 2 PUFFS INTO THE LUNGS EVERY 4 HOURS AS NEEDED FOR WHEEZING    atorvastatin (LIPITOR) 10 MG tablet TK 1 T PO D    BIOTIN PO Oral    hydroCHLOROthiazide (HYDRODIURIL) 25 mg, Oral, EVERY OTHER DAY    meclizine (ANTIVERT) 12.5 MG tablet TAKE 1/2 TO 1 TABLET BY MOUTH THREE TIMES DAILY AS NEEDED FOR DIZZINESS OR NAUSEA    omeprazole (PRILOSEC) 20 mg, Oral, DAILY    propranolol (INDERAL LA) 160 MG extended release capsule TAKE 1 CAPSULE BY MOUTH EVERY DAY    rivaroxaban (XARELTO) 20 MG TABS tablet TAKE 1 TABLET BY MOUTH DAILY WITH BREAKFAST    tiZANidine (ZANAFLEX) 4 mg, Oral, NIGHTLY PRN     LAST LABS  LDL Calculated   Date Value Ref Range Status   11/16/2020 62 <100 mg/dL Final     Lab Results   Component Value Date    HDL 38 (L) 11/16/2020     Lab Results   Component Value Date    TRIG 138 11/16/2020     Lab Results   Component Value Date     11/16/2020    K 4.3 11/16/2020    CREATININE 0.6 11/16/2020     Lab Results   Component Value Date    WBC 7.7 09/16/2019    HGB 11.6 (L) 09/16/2019     09/16/2019     Lab Results   Component Value Date    ALT 10 11/16/2020    AST 14 (L) 11/16/2020    ALKPHOS 106 11/16/2020    BILITOT 0.5 11/16/2020     TSH (uIU/mL)   Date Value   09/16/2019 1.13     Lab Results   Component Value Date    GLUCOSE 98 11/16/2020     Lab Results   Component Value Date    LABA1C 5.8 08/06/2020    LABA1C 5.5 09/16/2019    LABA1C 5.6 09/12/2018     Objective:   PHYSICAL EXAM   /84 (Site: Left Upper Arm, Position: Sitting, Cuff Size: Large Adult)   Pulse 86   Resp 16   Ht 5' 3\" (1.6 m)   Wt 211 lb (95.7 kg)   SpO2 97%   BMI 37.38 kg/m²   BP Readings from Last 5 Encounters:   09/16/21 122/84   08/12/21 126/82   02/12/21 130/85   08/06/20 130/62   01/09/20 (!) 129/59     Wt Readings from Last 5 Encounters:   09/16/21 211 lb (95.7 kg)   08/12/21 217 lb 3.2 oz (98.5 kg)   02/12/21 207 lb (93.9 kg)   08/06/20 210 lb (95.3 kg)   01/09/20 206 lb 9.6 oz (93.7 kg)      GENERAL:   · well-developed, well-nourished, alert, no distress. LUNGS:    · Breathing unlabored  · clear to auscultation bilaterally and good air movement  CARDIOVASC:   · regular rate and rhythm  · LEGS:  Lower extremity edema: none    SKIN: warm and dry     Assessment and Plan:      Diagnosis Orders   1. RUQ abdominal pain  CBC Auto Differential    Comprehensive Metabolic Panel    Lipase    US GALLBLADDER RUQ   2. Epigastric pain  CBC Auto Differential    Comprehensive Metabolic Panel    Lipase    US GALLBLADDER RUQ   3. Needs flu shot     4. Gastroesophageal reflux disease without esophagitis     5. Restrictive lung disease  CT CHEST WO CONTRAST      Continue current Tx plan. Any changes marked below. INSTRUCTIONS  · PLEASE GET BLOODWORK DRAWN TODAY ON FIRST FLOOR in 170. Take orders with you. RESULTS- most blood tests back in couple days. We will call you if any problems. If bloodwork good, you will get letter in mail or notified thru [address] (if signed up) within 2 weeks. If you do not, please call office. · Get ultrasound of gall bladder. · Get CT of lung. See pulmonary. · INCREASE prilosec to 20 mg twice a day   · Will need to see surgeon if looks like gall bladder.

## 2021-09-22 ENCOUNTER — HOSPITAL ENCOUNTER (OUTPATIENT)
Dept: ULTRASOUND IMAGING | Age: 81
Discharge: HOME | End: 2021-09-22
Payer: MEDICARE

## 2021-09-22 ENCOUNTER — HOSPITAL ENCOUNTER (OUTPATIENT)
Dept: CT IMAGING | Age: 81
Discharge: HOME | End: 2021-09-22
Payer: MEDICARE

## 2021-09-22 ENCOUNTER — TELEPHONE (OUTPATIENT)
Dept: FAMILY MEDICINE CLINIC | Age: 81
End: 2021-09-22

## 2021-09-22 DIAGNOSIS — R10.13 EPIGASTRIC PAIN: ICD-10-CM

## 2021-09-22 DIAGNOSIS — J98.4 RESTRICTIVE LUNG DISEASE: ICD-10-CM

## 2021-09-22 DIAGNOSIS — R10.11 RUQ ABDOMINAL PAIN: ICD-10-CM

## 2021-09-22 DIAGNOSIS — K81.0 ACUTE CHOLECYSTITIS: Primary | ICD-10-CM

## 2021-09-22 PROCEDURE — 76705 ECHO EXAM OF ABDOMEN: CPT

## 2021-09-22 PROCEDURE — G1010 CDSM STANSON: HCPCS

## 2021-09-22 NOTE — TELEPHONE ENCOUNTER
Called pt and she needs a ref for general surgery. No fevers or worsening pain. Can she has a ref for [facility] general surgeon.

## 2021-09-22 NOTE — TELEPHONE ENCOUNTER
Based on her ultrasound and CT scan it appears that Adeline Jesus may have an inflamed gallbladder called cholecystitis. I would recommend she see our surgeon to get a consultation to see if she needs to have this removed or if they can give this some time depending on her symptoms. Please give her a call to let her know.   If she is having severe or worsening pain or fevers this would warrant an ER visit for her to be treated more promptly

## 2021-09-29 ENCOUNTER — OFFICE VISIT (OUTPATIENT)
Dept: PULMONOLOGY | Age: 81
End: 2021-09-29
Payer: MEDICARE

## 2021-09-29 VITALS
BODY MASS INDEX: 37.74 KG/M2 | HEIGHT: 63 IN | OXYGEN SATURATION: 99 % | DIASTOLIC BLOOD PRESSURE: 80 MMHG | SYSTOLIC BLOOD PRESSURE: 120 MMHG | TEMPERATURE: 97.7 F | HEART RATE: 78 BPM | RESPIRATION RATE: 26 BRPM | WEIGHT: 213 LBS

## 2021-09-29 DIAGNOSIS — J98.4 RESTRICTIVE LUNG DISEASE: ICD-10-CM

## 2021-09-29 DIAGNOSIS — J90 LOCULATED PLEURAL EFFUSION: Primary | ICD-10-CM

## 2021-09-29 PROCEDURE — 1123F ACP DISCUSS/DSCN MKR DOCD: CPT | Performed by: INTERNAL MEDICINE

## 2021-09-29 PROCEDURE — 1036F TOBACCO NON-USER: CPT | Performed by: INTERNAL MEDICINE

## 2021-09-29 PROCEDURE — 4040F PNEUMOC VAC/ADMIN/RCVD: CPT | Performed by: INTERNAL MEDICINE

## 2021-09-29 PROCEDURE — G8417 CALC BMI ABV UP PARAM F/U: HCPCS | Performed by: INTERNAL MEDICINE

## 2021-09-29 PROCEDURE — G8427 DOCREV CUR MEDS BY ELIG CLIN: HCPCS | Performed by: INTERNAL MEDICINE

## 2021-09-29 PROCEDURE — 1090F PRES/ABSN URINE INCON ASSESS: CPT | Performed by: INTERNAL MEDICINE

## 2021-09-29 PROCEDURE — 99204 OFFICE O/P NEW MOD 45 MIN: CPT | Performed by: INTERNAL MEDICINE

## 2021-09-29 PROCEDURE — G8400 PT W/DXA NO RESULTS DOC: HCPCS | Performed by: INTERNAL MEDICINE

## 2021-09-29 NOTE — PROGRESS NOTES
Pulmonary Consult           REASON FOR CONSULTATION:  Chief Complaint   Patient presents with    Establish Care     recent ct and bronchodialtor     COPD        Consult at request of Crystal Ramirez MD     PCP: Crystal Ramirez MD        Assessment and Plan:   Diagnosis Orders   1. Loculated pleural effusion  CT CHEST WO CONTRAST   2. Restrictive lung disease  CT CHEST WO CONTRAST       Plan:  I believe patient symptoms are related to her restirctive lung disease which is two component   1. Extraparenchymal secondary to patient body habitus for which we believe exercise and wt loss will help significantly  2. Parenchymal related to previous pneumonia and effusion which is permanent, her small effusion that is present now is not associated with signs of infection and I do not recommend any intervention except monitoring with repeat CT in one year if enlarging it could be drained endobronchially   Continue albuterol as needed. HISTORY OF PRESENT ILLNESS: Helga Ramirez is very pleasant [de-identified]y.o. year old  lady with medical history stated below significant for former smoker with 55-year pack history of smoking quit about 8 years ago was referred to us for abnormal PFT. Her PFT showed FVC of 1.66 L 69% predicted, FEV1 1.1 9 L 65% predicted with a ratio of 72, ratio using SVC was 63% and no significant postbronchodilator response. Lung volumes showed mild to moderate restriction in a pattern consistent with parenchymal restriction and her DLCO was at 54% predicted. She has a history of CAP 2014 with parapneumonic effusion requiring decortication back in 2014. She c/o sob with moderate exertion and occasional wheezing for which she is on as needed AKOSUA  Her CT chest which I personally reviewed showed small loculated central fluid collection 3x4 cm, patient denied chest pain, no fever, no chills.    Multiple small lung nodules   And bilateral linear atelectasis/scarring     Past Medical History:   Diagnosis Date    Allergic rhinitis     Asthma     Benign essential tremor 1/5/2012    Cancer ([facility]     skin cancer    Chronic back pain     Chronic sinusitis     Dizziness     Edema     Generalized osteoarthritis     GERD (gastroesophageal reflux disease)     Hearing loss     Herpes zoster 2010    Hyperlipidemia     Hypertension     Hypertriglyceridemia, essential     Keloid scar     Lung disease     Nonspecific (abnormal) findings on radiological and other examination of skull and head 2010    neg bone scan    Nosebleed     Osteoporosis     DEXA Scan: T -3.7 12/02; -0.5 10/05, 8/08 -0.9    Paroxysmal A-fib ([address] 2/2012    once    PONV (postoperative nausea and vomiting)     Rotator cuff tear- right     Screening for cardiovascular condition     0/99 mild diastolic dysfxn    Sleep apnea     Tinnitus     Urge incontinence     Vertigo        Past Surgical History:   Procedure Laterality Date    BACK SURGERY  1982    BACK SURGERY  2000    epidural    CHEST TUBE INSERTION  2014    Right VATS drainage of loculated effusion, decortication    HYSTERECTOMY  1983    both ovaries intact    HYSTERECTOMY, TOTAL ABDOMINAL      KNEE ARTHROSCOPY  2003    Right    MOHS SURGERY      ROTATOR CUFF REPAIR  2011    right    TOTAL HIP ARTHROPLASTY  2012    left    TOTAL KNEE ARTHROPLASTY Right 06/2018       family history includes Breast Cancer in her sister; Coronary Art Dis in her father and mother; Diabetes in her father and mother; Hypertension in her brother and sister; Prostate Cancer in her brother; Stroke in her mother. SOCIAL HISTORY:   reports that she quit smoking about 7 years ago. Her smoking use included cigarettes. She has a 5.00 pack-year smoking history.  She has never used smokeless tobacco.      ALLERGIES:  Patient is allergic to codeine, myrbetriq [mirabegron], and vicodin 108 (90 Base) MCG/ACT inhaler INHALE 2 PUFFS INTO THE LUNGS EVERY 4 HOURS AS NEEDED FOR WHEEZING 6.7 g 3    tiZANidine (ZANAFLEX) 4 MG tablet Take 1 tablet by mouth nightly as needed (muscles) 90 tablet 1    atorvastatin (LIPITOR) 10 MG tablet TK 1 T PO D      meclizine (ANTIVERT) 12.5 MG tablet TAKE 1/2 TO 1 TABLET BY MOUTH THREE TIMES DAILY AS NEEDED FOR DIZZINESS OR NAUSEA 30 tablet 1    rivaroxaban (XARELTO) 20 MG TABS tablet TAKE 1 TABLET BY MOUTH DAILY WITH BREAKFAST 90 tablet 1    BIOTIN PO Take by mouth       No current facility-administered medications for this visit. Data Reviewed:   CBC and Renal reviewed  Last CBC  Lab Results   Component Value Date    WBC 10.0 09/16/2021    RBC 4.25 09/16/2021    HGB 12.5 09/16/2021    MCV 88.1 09/16/2021     09/16/2021     Last Renal  Lab Results   Component Value Date     09/16/2021    K 4.2 09/16/2021    CL 99 09/16/2021    CO2 20 09/16/2021    CO2 27 11/16/2020    CO2 23 09/16/2019    BUN 17 09/16/2021    CREATININE 0.8 09/16/2021    GLUCOSE 95 09/16/2021    GLUCOSE 98 06/16/2011    CALCIUM 9.4 09/16/2021       Last ABG  POC Blood Gas: No results found for: POCPH, POCPCO2, POCPO2, POCHCO3, NBEA, CWIL7DNV  No results for input(s): PH, PCO2, PO2, HCO3, BE, O2SAT in the last 72 hours. Radiology Review:  Pertinent images / reports were reviewed as a part of this visit. CT Chest w/ contrast: No results found for this or any previous visit. CT Chest w/o contrast: Results for orders placed during the hospital encounter of 09/22/21    CT CHEST WO CONTRAST    Narrative  EXAMINATION:  CT OF THE CHEST WITHOUT CONTRAST 9/22/2021 7:11 am    TECHNIQUE:  CT of the chest was performed without the administration of intravenous  contrast. Multiplanar reformatted images are provided for review.  Dose  modulation, iterative reconstruction, and/or weight based adjustment of the  mA/kV was utilized to reduce the radiation dose to as low as reasonably  achievable. COMPARISON:  11/08/2014    HISTORY:  ORDERING SYSTEM PROVIDED HISTORY: Restrictive lung disease  TECHNOLOGIST PROVIDED HISTORY:  Reason for Exam: Restrictive lung disease, former smoker  Acuity: Chronic  Type of Exam: Ongoing    FINDINGS:  Mediastinum: There is a 2.4 x 3.6 x 3.9 cm fluid collection posterior to the  trachea esophagus and anterior to the upper thoracic spine. Underlying  osseous structures demonstrate no acute abnormality on limited imaging. The heart size is within normal limits. There is coronary artery  calcification    The aorta and origin of the great vessels is grossly unremarkable on limited  noncontrast imaging    The main pulmonary artery is normal in caliber. Calcified left hilar and mediastinal lymph nodes are compatible with old  granulomatous disease    No suspicious mediastinal adenopathy noted. There is a small hiatal hernia. The esophagus appears grossly unremarkable  in appearance. Thyroid gland is somewhat heterogeneous in appearance without a discrete  nodule identified. Suspected probable loculated fluid collection is noted on remote CT in the  right hemithorax are noted. This fluid collection noted on today's study  could represent a residual chronic fluid collection however this is  indeterminate. Lungs/pleura: The central airways are patent. Pleural thickening and irregular opacities in the right hemithorax likely  represent scarring. A degree of underlying fibrosis not excluded. A few scattered 1-3 mm noncalcified as well as calcified nodules on the right  are noted. Evaluation on the right was limited secondary to consolidation and pleural  effusion. Calcified granulomas on the left posteriorly at the left base noted. Ground-glass and irregular opacities peripherally at the left base and along  lateral aspect of the left upper lobe and within the lingula are noted.   A  few scattered 1-3 mm noncalcified nodules are noted which are not definitely  seen on prior study although this may be due to differences in slice  thickness and technique. No suspicious nodules noted. Upper Abdomen: Abnormal appearance of the gallbladder with a thickened  edematous appearance of the gallbladder wall and trace amount of  pericholecystic fluid noted. Stranding is noted along the descending portion  of the duodenum. Large stones are noted within the gallbladder. No significant dilation of the common duct is noted. The spleen is unremarkable in appearance. Low-attenuation lesions within the  kidney are noted the largest measuring 2.8 cm. Findings likely represent a  cyst.  The liver is heterogeneous in appearance. Bilateral adrenal glands are unremarkable. Soft Tissues/Bones: Multilevel degenerative changes are noted of the spine. Mild levoscoliosis of the upper thoracic spine appears similar to the prior  study. Impression  Focal fluid collection along the posterior mediastinum, spine. Findings may  represent a chronic loculated pleural effusion. The sterility of this  collection is indeterminate and correlation with patient's symptoms is  recommended. If there is clinical concern, follow-up CT with IV contrast could always be  considered. The underlying osseous structures appear grossly unremarkable. However if  there is clinical concern, MRI could always be considered to further evaluate. Scarring at the lung bases bilaterally right greater than left. Scattered 1-3 mm nodules likely postinflammatory or post infectious. Follow-up could be obtained as clinically indicated. Please see below  criteria. Findings in the upper abdomen suggest incidental acute or subacute  cholecystitis. Fleischner Society guidelines for follow-up and management of incidentally  detected pulmonary nodules:    Multiple Solid Nodules:    Nodule size less than 6 mm  In a low-risk patient, no routine follow-up.   In a high-risk patient, optional CT at 12 months. Nodule size equals 6-8 mm  In a low-risk patient, CT at 3-6 months, then consider CT at 18-24 months. In a high-risk patient, CT at 3-6 months, then CT at 18-24 months. Nodule size greater than 8 mm  In a low-risk patient, CT at 3-6 months, then consider CT at 18-24 months. In a high-risk patient, CT at 3-6 months, then CT at 18-24 months. - Low risk patients include individuals with minimal or absent history of  smoking and other known risk factors. - High risk patients include individuals with a history or smoking or known  risk factors. Radiology 2017 http://pubs. rsna.org/doi/full/10.1148/radiol. 2052486945    The findings were sent to the Radiology Results Communication Center at 9:09  am on 9/22/2021to be communicated to a licensed caregiver. CTPA: No results found for this or any previous visit. CXR PA/LAT: Results for orders placed during the hospital encounter of 02/10/15    XR Chest Standard TWO VW    Narrative  INDICATIONS:  CAP (community acquired pneumonia)    COMPARISON: 12/22/2014 and previous       views : 2    FINDINGS: There is chronic elevation of the right hemidiaphragm. The heart and pulmonary vascularity are within normal limits. The  right basilar infiltrate has improved and nearly completely  resolved. Residual blunting of the right costophrenic angle likely  represents pleural scarring. Impression  Resolved right infiltrate. Blunting of the right  costophrenic angle is most consistent with pleural scar. Pulmonary function testing 09/1/21  Her PFT showed FVC of 1.66 L 69% predicted, FEV1 1.1 9 L 65% predicted with a ratio of 72, ratio using SVC was 63% and no significant postbronchodilator response. This note was transcribed using [id number] [employer]. Please disregard any translational errors.     Trinity Kruse MD  [facility] Pulmonary, Sleep and Critical Care

## 2021-09-30 ENCOUNTER — TELEPHONE (OUTPATIENT)
Dept: FAMILY MEDICINE CLINIC | Age: 81
End: 2021-09-30

## 2021-09-30 ENCOUNTER — OFFICE VISIT (OUTPATIENT)
Dept: SURGERY | Age: 81
End: 2021-09-30
Payer: COMMERCIAL

## 2021-09-30 ENCOUNTER — PATIENT MESSAGE (OUTPATIENT)
Dept: FAMILY MEDICINE CLINIC | Age: 81
End: 2021-09-30

## 2021-09-30 VITALS
SYSTOLIC BLOOD PRESSURE: 131 MMHG | BODY MASS INDEX: 36.85 KG/M2 | HEART RATE: 77 BPM | WEIGHT: 208 LBS | DIASTOLIC BLOOD PRESSURE: 86 MMHG

## 2021-09-30 DIAGNOSIS — K80.00 ACUTE CALCULOUS CHOLECYSTITIS: Primary | ICD-10-CM

## 2021-09-30 PROCEDURE — 99203 OFFICE O/P NEW LOW 30 MIN: CPT | Performed by: SURGERY

## 2021-09-30 RX ORDER — AMOXICILLIN AND CLAVULANATE POTASSIUM 875; 125 MG/1; MG/1
1 TABLET, FILM COATED ORAL 2 TIMES DAILY
Qty: 14 TABLET | Refills: 0 | Status: SHIPPED | OUTPATIENT
Start: 2021-09-30 | End: 2021-10-07

## 2021-09-30 NOTE — TELEPHONE ENCOUNTER
Adrian Bunn was seen on 9/16/21 by Dr. Foley  RUQ pain, referrd to Dr. Lucia Garrett, he saw her today. He set surgery for Tuesday 10/5/21. Needs pre-op Dr. Arleen wanted to see of her appt from 9/16/21 with Dr. Samson Prince would be okay for the preop? Samson Prince is out of the office till her surgery date.

## 2021-09-30 NOTE — LETTER
CONSENT TO OPERATION         Laparoscopic Cholecystectomy with Cholangiogram, possible open     PATIENT : Jackson Solitario  YOB: 1940             DATE : 9/30/21    1. I request and consent that Dr. Bart Driver and/or his associates or assistants perform an operation and/or procedures on the above patient at [facility], to treat the condition(s) which appear indicated by the diagnostic studies already performed. 2. It has been explained to me by the informing physician that during the course of the operation and/or procedure(s) unforeseen conditions may be revealed that necessitate an extension of the original operation and/or procedure(s) or different operation and/or procedures than those set forth in Paragraph 1. I therefore authorize and request that my physician and/or his associates or assistants perform such operations and/or procedures as are necessary and desirable in the exercise of professional judgment. The authority granted under this Paragraph 2 shall extend to all conditions that require treatment and are known to my physician at the time the operation is commenced. 3. I have been made aware by the informing physician of certain risks and consequences that are associated with the operation and/or procedure(s) described in Paragraph 1, the reasonable alternative methods or treatment, the possible consequences, the possibility that the operation and/or procedure(s) may be unsuccessful and the possibility of complications. I understand the reasonably known risks to be:  - Bleeding  - Infection  - Poor Healing  - Possible Damage to Nerve, Vessel, Tendon/Muscle or Bone  - Need for further Treatment/Surgery  - Stiffness  - Pain  - Residual or Recurrent Symptoms  - Anesthetic and/or Medical Risks     4. I have also been informed by the informing physician that there are other risks from both known and unknown causes that are attendant to the performance of any surgical procedure. I am aware that the practice of medicine and surgery is not an exact science, and that no guarantees have been made to me concerning the results of the operation and/or procedure(s). 5. I consent to the administration of anesthesia and to the use of such anesthetics as may be deemed advisable by the anesthesiologist who has been engaged by me or my physician. 6. I certify that I have read and understand the above consent to operation and/or other procedure(s); that the explanations therein referred to were made to me by the informing physician in advance of my signing this consent; that all blanks or statements requiring insertion or completion were filled in and inapplicable paragraphs, if any, were stricken before I signed; and that all questions asked by me about the operation and/or procedure(s) which I have consented to have been fully answered in a satisfactory manner.            9/30/21                    _______________________  Signature Of Patient   Date              Witness                     OR Date:  ___________

## 2021-09-30 NOTE — Clinical Note
Hi Dr. Jaiden Guerrero,    I just saw Ms. Chin Haley for her acute cholecystitis. I recommended laparoscopic cholecystectomy given her imaging findings and ongoing symptoms. Would you mind either seeing her before surgery (tentatively scheduled 10/5) or clearing her based on your last visit? She is also going to call her cardiologist as well. Thank you for allowing me to take care of Ms. Solitario with you.     Ilana Gonzalez

## 2021-09-30 NOTE — Clinical Note
Crissy Jaimes,    I just saw Ms. Deanne Nissen for her acute cholecystitis. I recommended laparoscopic cholecystectomy given her imaging findings and ongoing symptoms. Would you mind either seeing her before surgery (tentatively scheduled 10/5) or clearing her based on your last visit? She is also going to call her cardiologist as well. Thank you for allowing me to take care of Ms. Solitario with you.     Vincenzo Lassiter

## 2021-09-30 NOTE — PROGRESS NOTES
education: Not on file    Highest education level: Not on file   Occupational History    Occupation:    Tobacco Use    Smoking status: Former Smoker     Packs/day: 0.25     Years: 20.00     Pack years: 5.00     Types: Cigarettes     Quit date: 2014     Years since quittin.0    Smokeless tobacco: Never Used    Tobacco comment: Advised not to resume   Vaping Use    Vaping Use: Never used   Substance and Sexual Activity    Alcohol use: Yes     Comment: socially    Drug use: No    Sexual activity: Not on file   Other Topics Concern    Not on file   Social History Narrative    Self-breast exams: yes. Lives with spouse. Exercise: stretching daily. Seatbelt use: Always. Living will: no, additional information provided     Social Determinants of Health     Financial Resource Strain: Low Risk     Difficulty of Paying Living Expenses: Not hard at all   Food Insecurity: Unknown    Worried About [address] in the Last Year: Patient refused   [address] in the Last Year: Patient refused   Transportation Needs: Unknown    Lack of Transportation (Medical): Patient refused    Lack of Transportation (Non-Medical): Patient refused   Physical Activity:     Days of Exercise per Week:     Minutes of Exercise per Session:    Stress:     Feeling of Stress :    Social Connections:     Frequency of Communication with Friends and Family:     Frequency of Social Gatherings with Friends and Family:     Attends Restoration Services:     Active Member of Clubs or Organizations:     Attends Club or Organization Meetings:     Marital Status:    Intimate Partner Violence:     Fear of Current or Ex-Partner:     Emotionally Abused:     Physically Abused:     Sexually Abused:           Vitals:    21 1348   BP: 131/86   Pulse: 77   Weight: 208 lb (94.3 kg)     Body mass index is 36.85 kg/m².      Wt Readings from Last 3 Encounters:   21 208 lb (94.3 kg)   21 213 lb (96.6 kg) 09/16/21 211 lb (95.7 kg)     BP Readings from Last 3 Encounters:   09/30/21 131/86   09/29/21 120/80   09/16/21 122/84          REVIEW OF SYSTEMS:   Pertinent positives are in HPI, otherwise all systems reviewed and negative    PHYSICAL EXAM:    CONSTITUTIONAL:  awake, alert, no apparent distress and moderately obese  ENT:  normocephalic, without obvious abnormality  NECK:  supple, symmetrical, trachea midline   LUNGS:  Resp easy and unlabored  CARDIOVASCULAR:  regular rate and rhythm  ABDOMEN:   soft, non-distended, moderate RUQ tenderness, no peritonitis, voluntary guarding absent, and hernia absent  MUSCULOSKELETAL: No edema  NEUROLOGIC:  Mental Status Exam:  Level of Alertness:   awake  Orientation:  person, place, time        DATA:  No results for input(s): WBC, HGB, HCT, PLT, NA, K, CL, CO2, BUN, CREATININE, MG, PHOS, CALCIUM, PTT, INR, AST, ALT, BILITOT, BILIDIR, NITRU, COLORU, BACTERIA in the last 72 hours.     Invalid input(s): PT, WBCU, RBCU, LEUKOCYTESUA  CBC with Differential:    Lab Results   Component Value Date    WBC 10.0 09/16/2021    RBC 4.25 09/16/2021    HGB 12.5 09/16/2021    HCT 37.4 09/16/2021     09/16/2021    MCV 88.1 09/16/2021    MCH 29.5 09/16/2021    MCHC 33.5 09/16/2021    RDW 13.7 09/16/2021    SEGSPCT 57.9 06/16/2011    BANDSPCT 2 09/16/2021    METASPCT 2 09/16/2021    LYMPHOPCT 18.0 09/16/2021    MONOPCT 2.0 09/16/2021    EOSPCT 1.8 06/16/2011    BASOPCT 1.0 09/16/2021    MONOSABS 0.2 09/16/2021    LYMPHSABS 1.8 09/16/2021    EOSABS 0.3 09/16/2021    BASOSABS 0.1 09/16/2021     BMP:    Lab Results   Component Value Date     09/16/2021    K 4.2 09/16/2021    CL 99 09/16/2021    CO2 20 09/16/2021    BUN 17 09/16/2021    LABALBU 3.8 09/16/2021    CREATININE 0.8 09/16/2021    CALCIUM 9.4 09/16/2021    GFRAA >60 09/16/2021    GFRAA >60 05/30/2013    LABGLOM >60 09/16/2021    LABGLOM 98.8 06/16/2011    GLUCOSE 95 09/16/2021    GLUCOSE 98 06/16/2011     Hepatic Function Panel:    Lab Results   Component Value Date    ALKPHOS 108 09/16/2021    ALT 21 09/16/2021    AST 24 09/16/2021    PROT 6.8 09/16/2021    BILITOT 0.4 09/16/2021    LABALBU 3.8 09/16/2021       Imaging: RUQ US from 9/22 shows gallstones with 3 mm GB wall  CT Chest 9/22 personally reviewed, showing gallstones and GB wall thickening with pericholecystic fluid    ASSESSMENT:     Diagnosis Orders   1. Acute calculous cholecystitis  amoxicillin-clavulanate (AUGMENTIN) 875-125 MG per tablet         PLAN:    We will schedule the patient for a laparoscopic cholecystectomy with intraoperative cholangiogram, possible open for his acute cholecystitis. The technical aspects, risks, benefits and complications of the procedure were discussed with the patient. She appears to understand, asks appropriate questions, and agrees to proceed with the procedure. She will need both medical and cardiac clearance. She will also need to be off of her xarelto 2 days beforehand. Will tentatively plan for surgery on Tuesday 10/5 given her urgency with cholecystitis. Will start augmentin in the meantime to carry her through surgery.       Electronically signed by Samira Fritz MD on 9/30/2021 at 2:23 PM

## 2021-09-30 NOTE — TELEPHONE ENCOUNTER
Yes ok for this.   I sent note to Dr. Humble Barton  She needs clearance from her Cardiologist and directions from them on her blood thinner

## 2021-09-30 NOTE — TELEPHONE ENCOUNTER
From: Bernadette Casillas  To: Alethea Kilgore MD  Sent: 9/30/2021 3:45 PM EDT  Subject: Non-Urgent Medical Question    I am to have Antwon Bladder surgery Oct 5th and need an ok from your office- I was just in your office a few days ago, maybe that was soon enough??  at [facility] - POORNIMA MEHTA needs this ok so he can remove my bladder- thank you

## 2021-09-30 NOTE — TELEPHONE ENCOUNTER
Pt is having gall bladder surgery on 10/05/2021 needs a pre op she stated she is certain since she was here 2 weeks ago that Dr Stephany Augustin will fax over and sign off on the ok that the Dr Jeyson Parks fax to [phone]   Phone is [phone]    Advised pt will need a pre op   Pt also needs a test for covid not sure where to go       Please advise

## 2021-10-01 ENCOUNTER — TELEPHONE (OUTPATIENT)
Dept: FAMILY MEDICINE CLINIC | Age: 81
End: 2021-10-01

## 2021-10-01 ENCOUNTER — TELEPHONE (OUTPATIENT)
Dept: SURGERY | Age: 81
End: 2021-10-01

## 2021-10-01 NOTE — TELEPHONE ENCOUNTER
Spoke with patient regarding scheduled surgery on 10- with Dr. Humble Barton. Patient is asked to arrive by 7:00 AM @ [address] [address] after midnight. May take any Heart or Blood Pressure medication with a small sip of water to wash them down. Hold Xarelto 2 days prior to surgery date (last dose should be Saturday 10/2). You will need someone to drive you home following this procedure, and to stay with you for the remainder of the day, due to the anesthesia. Please bring a Photo ID & Insurance card with you, and check-in at the Surgery Desk down the right-hand hallway on the first floor. Patient has been cleared by  Dr. Dion Hill for Dr. Subha Luque. She has a message out to Dr. Basia Medina (Cardiology) to obtain written clearance. Patient went to Urgent Care 10/01/2021 for her Covid-19 Test.  They should be faxing us the results. Surgery is scheduled to start at approx. 8:40 AM and should take approx. 60 minutes. If the hospital needs any further information, someone will give you a call. Patient expressed a verbal understanding of these instructions and had no further questions at this time. Call ended.

## 2021-10-01 NOTE — PROGRESS NOTES
C-Difficile admission screening and protocol:       * Admitted with diarrhea? [] YES    [x]  NO     *Prior history of C-Diff. In last 3 months? [] YES    [x]  NO     *Antibiotic use in the past 6-8 weeks? []  NO    [x]  YES                 If yes, which ANTIBIOTIC AND REASON__AMOXICILLIN FOR GALLBLADDER INFLAMMED____     *Prior hospitalization or nursing home in the last month?  []  YES    [x]  NO

## 2021-10-01 NOTE — TELEPHONE ENCOUNTER
I reviewed Zara's chart. I'd like to give her clearance but I've never seen her in the clinic. She has surgery Tuesday so before her PCP comes back  Let's try to get her in for a 20 min appt on Monday for pre-op. Thanks.

## 2021-10-01 NOTE — PROGRESS NOTES
[address] [facility] time__0730-per office__________        Surgery time_____0900_______    Take the following medications with a sip of water: Follow your MD/Surgeons pre-procedure instructions regarding your medications    Do not eat or drink anything after 12:00 midnight prior to your surgery. This includes water chewing gum, mints and ice chips. You may brush your teeth and gargle the morning of your surgery, but do not swallow the water     Please see your family doctor/pediatrician for a history and physical and/or concerning medications. Bring any test results/reports from your physicians office. If you are under the care of a heart doctor or specialist doctor, please be aware that you may be asked to them for clearance    You may be asked to stop blood thinners such as Coumadin, Plavix, Fragmin, Lovenox, etc., or any anti-inflammatories such as:  Aspirin, Ibuprofen, Advil, Naproxen prior to your surgery. We also ask that you stop any OTC medications such as fish oil, vitamin E, glucosamine, garlic, Multivitamins, COQ 10, etc.stop xarelto as instructed    We ask that you do not smoke 24 hours prior to surgery  We ask that you do not  drink any alcoholic beverages 24 hours prior to surgery     You must make arrangements for a responsible adult to take you home after your surgery. For your safety you will not be allowed to leave alone or drive yourself home. Your surgery will be cancelled if you do not have a ride home. Also for your safety, it is strongly suggested that someone stay with you the first 24 hours after your surgery. A parent or legal guardian must accompany a child scheduled for surgery and plan to stay at the hospital until the child is discharged. Please do not bring other children with you. For your comfort, please wear simple loose fitting clothing to the hospital.  Please do not bring valuables.     Do not wear any make-up or nail polish on your fingers or toes      For your safety, please do not wear any jewelry or body piercing's on the day of surgery. All jewelry must be removed. If you have dentures, they will be removed before going to operating room. For your convenience, we will provide you with a container. If you wear contact lenses or glasses, they will be removed, please bring a case for them. If you have a living will and a durable power of  for healthcare, please bring in a copy. As part of our patient safety program to minimize surgical site infections, we ask you to do the following:    · Please notify your surgeon if you develop any illness between         now and the  day of your surgery. · This includes a cough, cold, fever, sore throat, nausea,         or vomiting, and diarrhea, etc.  ·  Please notify your surgeon if you experience dizziness, shortness         of breath or blurred vision between now and the time of your surgery. Do not shave your operative site 96 hours prior to surgery. For face and neck surgery, men may use an electric razor 48 hours   prior to surgery. You may shower the night before surgery or the morning of   your surgery with an antibacterial soap. You will need to bring a photo ID and insurance card    [facility] has an onsite pharmacy, would you like to utilize our pharmacy     If you will be staying overnight and use a C-pap machine, please bring   your C-pap to hospital     Our goal is to provide you with excellent care, therefore, visitors will be limited to two(2) in the room at a time so that we may focus on providing this care for you. Please contact pre-admission testing if you have any further questions.                  [facility] phone number:  [address] PAT fax number:  [phone]  Please note these are generalized instructions for all surgical cases, you may be provided with more specific instructions according to your surgery. SAFETY FIRST. .call before you fall

## 2021-10-04 ENCOUNTER — OFFICE VISIT (OUTPATIENT)
Dept: FAMILY MEDICINE CLINIC | Age: 81
End: 2021-10-04
Payer: COMMERCIAL

## 2021-10-04 ENCOUNTER — ANESTHESIA EVENT (OUTPATIENT)
Dept: OPERATING ROOM | Age: 81
End: 2021-10-04
Payer: MEDICARE

## 2021-10-04 VITALS
DIASTOLIC BLOOD PRESSURE: 80 MMHG | SYSTOLIC BLOOD PRESSURE: 125 MMHG | RESPIRATION RATE: 18 BRPM | BODY MASS INDEX: 37.74 KG/M2 | HEART RATE: 86 BPM | WEIGHT: 213 LBS | OXYGEN SATURATION: 96 % | HEIGHT: 63 IN

## 2021-10-04 DIAGNOSIS — K81.9 CHOLECYSTITIS: ICD-10-CM

## 2021-10-04 DIAGNOSIS — I25.10 CHRONIC CORONARY ARTERY DISEASE: ICD-10-CM

## 2021-10-04 DIAGNOSIS — I48.0 PAROXYSMAL A-FIB (HCC): ICD-10-CM

## 2021-10-04 DIAGNOSIS — J44.9 CHRONIC OBSTRUCTIVE PULMONARY DISEASE, UNSPECIFIED COPD TYPE (HCC): ICD-10-CM

## 2021-10-04 DIAGNOSIS — Z01.818 PREOP EXAMINATION: Primary | ICD-10-CM

## 2021-10-04 PROCEDURE — 99213 OFFICE O/P EST LOW 20 MIN: CPT | Performed by: FAMILY MEDICINE

## 2021-10-04 ASSESSMENT — ENCOUNTER SYMPTOMS
VOMITING: 0
DIARRHEA: 0
SHORTNESS OF BREATH: 0
NAUSEA: 0
EYE REDNESS: 0
COLOR CHANGE: 0
SINUS PRESSURE: 0
ABDOMINAL PAIN: 1
SORE THROAT: 0
COUGH: 0

## 2021-10-04 NOTE — PROGRESS NOTES
Subjective:     Chief Complaint   Patient presents with    Pre-op Exam     Pt is having gallbladder surgery. Dr Eliseo Cortez is preforming ther surgery. Fred Mcdaniel is a [de-identified] y.o.  female who presents to the office today for a preoperative consultation at the request of surgeon Dr. Tisha Gee who plans on performing lap cholecstectomy on 10/5/21. The problem warranting surgery is cholecystitis noted on CT imaging. Continues to have RUQ pain    Risk factors include:    Paroxysmal a fib: holding Xarelto, had clearance from Cardiology  Diabetes: no  Coronary Artery Disease: yes, clearance from Cards  COPD: yes  Tobacco use? No longer smokes (quit 2014)    Planned anesthesia is General.   History of anesthesia problems?  No, has tolerated general anesthesia well in the past (besides nausea/vomiting)  No history of bleeding disorder or clotting disorder    Patient Active Problem List   Diagnosis    GERD (gastroesophageal reflux disease)    Urge incontinence    Hyperlipidemia    Chronic back pain    Osteoporosis- last DEXA nl 1/2012    Allergic rhinitis    Edema    Generalized osteoarthritis    Vertigo    Tinnitus    Rotator cuff tear- right    Benign essential tremor    Paroxysmal A-fib (HCC)    Essential hypertension    Obstructive apnea    Asthmatic bronchitis    Prediabetes    History of basal cell carcinoma (BCC)    Chronic coronary artery disease    History of total left hip replacement    Anemia- nl iron, B12, folate 2018    History of knee replacement, total, right    Family history of breast cancer in sister    Chronic obstructive pulmonary disease, unspecified COPD type ([address]     Past Surgical History:   Procedure Laterality Date    BACK SURGERY  1982    BACK SURGERY  2000    epidural    CHEST TUBE INSERTION  2014    Right VATS drainage of loculated effusion, decortication    COLONOSCOPY      HYSTERECTOMY  1983    both ovaries intact    HYSTERECTOMY, TOTAL ABDOMINAL  KNEE ARTHROSCOPY  2003    Right    MOHS SURGERY      ROTATOR CUFF REPAIR  2011    right    TOTAL HIP ARTHROPLASTY  2012    left    TOTAL KNEE ARTHROPLASTY Right 06/2018     Family History   Problem Relation Age of Onset    Stroke Mother     Coronary Art Dis Mother     Cancer Mother     Coronary Art Dis Father     Diabetes Father     Breast Cancer Sister     Hypertension Sister     Hypertension Brother     Prostate Cancer Brother     Kidney Disease Sister     Breast Cancer Sister     Breast Cancer Sister     No Known Problems Sister     No Known Problems Sister     No Known Problems Sister     No Known Problems Sister      Allergies   Allergen Reactions    Codeine Nausea And Vomiting    Myrbetriq [Mirabegron] Nausea And Vomiting    Vicodin [Hydrocodone-Acetaminophen] Nausea And Vomiting     Prior to Visit Medications    Medication Sig Taking?  Authorizing Provider   amoxicillin-clavulanate (AUGMENTIN) 875-125 MG per tablet Take 1 tablet by mouth 2 times daily for 7 days Yes Ricardo Canas MD   omeprazole (PRILOSEC) 20 MG delayed release capsule Take 1 capsule by mouth 2 times daily  Patient taking differently: Take 20 mg by mouth Daily  Yes Arabella Stephen MD   propranolol (INDERAL LA) 160 MG extended release capsule TAKE 1 CAPSULE BY MOUTH EVERY DAY Yes Arabella Stephen MD   hydroCHLOROthiazide (HYDRODIURIL) 25 MG tablet Take 25 mg by mouth daily  Yes Historical Provider, MD   albuterol sulfate  (90 Base) MCG/ACT inhaler INHALE 2 PUFFS INTO THE LUNGS EVERY 4 HOURS AS NEEDED FOR WHEEZING Yes Arabella Stephen MD   tiZANidine (ZANAFLEX) 4 MG tablet Take 1 tablet by mouth nightly as needed (muscles) Yes Arabella Stephen MD   atorvastatin (LIPITOR) 10 MG tablet TK 1 T PO D Yes Historical Provider, MD   meclizine (ANTIVERT) 12.5 MG tablet TAKE 1/2 TO 1 TABLET BY MOUTH THREE TIMES DAILY AS NEEDED FOR DIZZINESS OR NAUSEA Yes Arabella Stephen MD   Biotin 3 MG TABS Take 1 capsule by mouth daily  Patient not taking: Reported on 10/4/2021  Historical Provider, MD   rivaroxaban (XARELTO) 20 MG TABS tablet TAKE 1 TABLET BY MOUTH DAILY WITH BREAKFAST  Patient not taking: Reported on 10/4/2021  Donna Trevino MD     Review of Systems   Constitutional: Negative for chills and fever. HENT: Negative for congestion, sinus pressure and sore throat. Eyes: Negative for redness and visual disturbance. Respiratory: Negative for cough and shortness of breath. Cardiovascular: Negative for chest pain and leg swelling. Gastrointestinal: Positive for abdominal pain. Negative for diarrhea, nausea and vomiting. Genitourinary: Negative for difficulty urinating. Skin: Negative for color change and rash. Neurological: Negative for dizziness and headaches. Psychiatric/Behavioral: Negative for confusion. Objective:     Vitals:    10/04/21 0831   BP: 125/80   Pulse: 86   Resp: 18   SpO2: 96%     Physical Exam  Constitutional:       Appearance: She is well-developed. HENT:      Head: Normocephalic and atraumatic. Eyes:      Conjunctiva/sclera: Conjunctivae normal.   Neck:      Thyroid: No thyromegaly. Cardiovascular:      Rate and Rhythm: Normal rate and regular rhythm. Heart sounds: Normal heart sounds. No murmur heard. Pulmonary:      Effort: Pulmonary effort is normal.      Breath sounds: Normal breath sounds. No wheezing. Abdominal:      General: Bowel sounds are normal.      Palpations: Abdomen is soft. There is no mass. Comments: RUQ tenderness   Musculoskeletal:         General: Normal range of motion. Cervical back: Normal range of motion and neck supple. Lymphadenopathy:      Cervical: No cervical adenopathy. Skin:     General: Skin is warm and dry. Findings: No rash. Comments: Normal turgor   Neurological:      Mental Status: She is alert and oriented to person, place, and time.       Deep Tendon Reflexes: Reflexes normal.   Psychiatric:         Thought Content: Thought content normal.         Cardiographics  ECG: clearance by Cardiology      Assessment:      Mansoor Domingoghassan Solitario with planned surgery as above. Cardiac Risk Estimation: per the Revised Cardiac Risk Index (Circ. 100:1043, 1999), the patient's risk factors for cardiac complications include \"high-risk\" surgery (intraperitoneal, intrathoracic, or suprainguinal vascular), history of ischemic heart disease, putting the patient in: RCI RISK CLASS III (2 risk factors, risk of major cardiac compl. appr. 3.6%)     Plan:   1. Preop examination    2. Cholecystitis    3. Chronic obstructive pulmonary disease, unspecified COPD type (HCC)    4. Paroxysmal A-fib ([address]    5. Chronic coronary artery disease    Ok to proceed with the procedure. - Low/moderate cardiac risk  - Clearance has been obtained from Cardiology, holding Xarelto. Normal stress imaging 7/2/21  - Has tolerated general anesthesia well in the past  - No history of bleeding disorder or DVT     Pt advised to stop all Rx except propranolol the a.m. of surgery. Patient advised to hold blood thinning medication (including aspirin and NSAIDs) 7 days prior to procedure. Prophylaxis for cardiac events with perioperative beta-blockers: already on BB     Deep vein thrombosis prophylaxis postoperatively: regimen to be chosen by surgical team if applicable. Other measures: Postoperative incentive spirometry to prevent pneumonia. Thank you for assisting me in the care of this patient.       Reviewed her CT scan results especially the lung fluid collection - she reports she has discussed this with her Pulmonologist and will follow up for repeat CT in 1 year

## 2021-10-05 ENCOUNTER — ANESTHESIA (OUTPATIENT)
Dept: OPERATING ROOM | Age: 81
End: 2021-10-05
Payer: MEDICARE

## 2021-10-05 ENCOUNTER — APPOINTMENT (OUTPATIENT)
Dept: GENERAL RADIOLOGY | Age: 81
End: 2021-10-05
Attending: SURGERY
Payer: MEDICARE

## 2021-10-05 ENCOUNTER — HOSPITAL ENCOUNTER (OUTPATIENT)
Age: 81
Setting detail: OUTPATIENT SURGERY
Discharge: HOME | End: 2021-10-05
Attending: SURGERY | Admitting: SURGERY
Payer: MEDICARE

## 2021-10-05 VITALS
RESPIRATION RATE: 13 BRPM | TEMPERATURE: 96.1 F | OXYGEN SATURATION: 98 % | SYSTOLIC BLOOD PRESSURE: 152 MMHG | DIASTOLIC BLOOD PRESSURE: 67 MMHG

## 2021-10-05 VITALS
TEMPERATURE: 97 F | BODY MASS INDEX: 37.64 KG/M2 | HEART RATE: 86 BPM | OXYGEN SATURATION: 95 % | HEIGHT: 63 IN | DIASTOLIC BLOOD PRESSURE: 76 MMHG | RESPIRATION RATE: 16 BRPM | WEIGHT: 212.41 LBS | SYSTOLIC BLOOD PRESSURE: 137 MMHG

## 2021-10-05 DIAGNOSIS — K80.00 ACUTE CALCULOUS CHOLECYSTITIS: ICD-10-CM

## 2021-10-05 PROCEDURE — 6360000002 HC RX W HCPCS

## 2021-10-05 PROCEDURE — 6370000000 HC RX 637 (ALT 250 FOR IP): Performed by: STUDENT IN AN ORGANIZED HEALTH CARE EDUCATION/TRAINING PROGRAM

## 2021-10-05 PROCEDURE — 7100000001 HC PACU RECOVERY - ADDTL 15 MIN: Performed by: SURGERY

## 2021-10-05 PROCEDURE — 7100000011 HC PHASE II RECOVERY - ADDTL 15 MIN: Performed by: SURGERY

## 2021-10-05 PROCEDURE — 88304 TISSUE EXAM BY PATHOLOGIST: CPT

## 2021-10-05 PROCEDURE — 2500000003 HC RX 250 WO HCPCS: Performed by: SURGERY

## 2021-10-05 PROCEDURE — 94761 N-INVAS EAR/PLS OXIMETRY MLT: CPT

## 2021-10-05 PROCEDURE — 3700000000 HC ANESTHESIA ATTENDED CARE: Performed by: SURGERY

## 2021-10-05 PROCEDURE — 3600000004 HC SURGERY LEVEL 4 BASE: Performed by: SURGERY

## 2021-10-05 PROCEDURE — 7100000000 HC PACU RECOVERY - FIRST 15 MIN: Performed by: SURGERY

## 2021-10-05 PROCEDURE — 2580000003 HC RX 258: Performed by: SURGERY

## 2021-10-05 PROCEDURE — 6360000002 HC RX W HCPCS: Performed by: SURGERY

## 2021-10-05 PROCEDURE — 7100000010 HC PHASE II RECOVERY - FIRST 15 MIN: Performed by: SURGERY

## 2021-10-05 PROCEDURE — 47563 LAPARO CHOLECYSTECTOMY/GRAPH: CPT | Performed by: SURGERY

## 2021-10-05 PROCEDURE — 6360000004 HC RX CONTRAST MEDICATION: Performed by: SURGERY

## 2021-10-05 PROCEDURE — 3600000014 HC SURGERY LEVEL 4 ADDTL 15MIN: Performed by: SURGERY

## 2021-10-05 PROCEDURE — 3700000001 HC ADD 15 MINUTES (ANESTHESIA): Performed by: SURGERY

## 2021-10-05 PROCEDURE — 2500000003 HC RX 250 WO HCPCS

## 2021-10-05 PROCEDURE — 6360000002 HC RX W HCPCS: Performed by: ANESTHESIOLOGY

## 2021-10-05 PROCEDURE — 94640 AIRWAY INHALATION TREATMENT: CPT

## 2021-10-05 PROCEDURE — 74300 X-RAY BILE DUCTS/PANCREAS: CPT

## 2021-10-05 PROCEDURE — 2709999900 HC NON-CHARGEABLE SUPPLY: Performed by: SURGERY

## 2021-10-05 PROCEDURE — 2580000003 HC RX 258: Performed by: ANESTHESIOLOGY

## 2021-10-05 RX ORDER — MAGNESIUM HYDROXIDE 1200 MG/15ML
LIQUID ORAL CONTINUOUS PRN
Status: COMPLETED | OUTPATIENT
Start: 2021-10-05 | End: 2021-10-05

## 2021-10-05 RX ORDER — ONDANSETRON 2 MG/ML
INJECTION INTRAMUSCULAR; INTRAVENOUS PRN
Status: DISCONTINUED | OUTPATIENT
Start: 2021-10-05 | End: 2021-10-05 | Stop reason: SDUPTHER

## 2021-10-05 RX ORDER — LIDOCAINE HYDROCHLORIDE 20 MG/ML
INJECTION, SOLUTION EPIDURAL; INFILTRATION; INTRACAUDAL; PERINEURAL PRN
Status: DISCONTINUED | OUTPATIENT
Start: 2021-10-05 | End: 2021-10-05 | Stop reason: SDUPTHER

## 2021-10-05 RX ORDER — FENTANYL CITRATE 50 UG/ML
INJECTION, SOLUTION INTRAMUSCULAR; INTRAVENOUS PRN
Status: DISCONTINUED | OUTPATIENT
Start: 2021-10-05 | End: 2021-10-05 | Stop reason: SDUPTHER

## 2021-10-05 RX ORDER — ACETAMINOPHEN 325 MG/1
650 TABLET ORAL
Status: DISCONTINUED | OUTPATIENT
Start: 2021-10-05 | End: 2021-10-05 | Stop reason: HOSPADM

## 2021-10-05 RX ORDER — DEXAMETHASONE SODIUM PHOSPHATE 4 MG/ML
INJECTION, SOLUTION INTRA-ARTICULAR; INTRALESIONAL; INTRAMUSCULAR; INTRAVENOUS; SOFT TISSUE PRN
Status: DISCONTINUED | OUTPATIENT
Start: 2021-10-05 | End: 2021-10-05 | Stop reason: SDUPTHER

## 2021-10-05 RX ORDER — FENTANYL CITRATE 50 UG/ML
25 INJECTION, SOLUTION INTRAMUSCULAR; INTRAVENOUS EVERY 5 MIN PRN
Status: DISCONTINUED | OUTPATIENT
Start: 2021-10-05 | End: 2021-10-05 | Stop reason: HOSPADM

## 2021-10-05 RX ORDER — SODIUM CHLORIDE 9 MG/ML
25 INJECTION, SOLUTION INTRAVENOUS PRN
Status: DISCONTINUED | OUTPATIENT
Start: 2021-10-05 | End: 2021-10-05 | Stop reason: HOSPADM

## 2021-10-05 RX ORDER — PROPOFOL 10 MG/ML
INJECTION, EMULSION INTRAVENOUS PRN
Status: DISCONTINUED | OUTPATIENT
Start: 2021-10-05 | End: 2021-10-05 | Stop reason: SDUPTHER

## 2021-10-05 RX ORDER — HYDROCODONE BITARTRATE AND ACETAMINOPHEN 5; 325 MG/1; MG/1
1-2 TABLET ORAL EVERY 6 HOURS PRN
Qty: 28 TABLET | Refills: 0 | Status: SHIPPED | OUTPATIENT
Start: 2021-10-05 | End: 2021-10-12

## 2021-10-05 RX ORDER — BUPIVACAINE HYDROCHLORIDE 5 MG/ML
INJECTION, SOLUTION EPIDURAL; INTRACAUDAL
Status: COMPLETED | OUTPATIENT
Start: 2021-10-05 | End: 2021-10-05

## 2021-10-05 RX ORDER — OXYCODONE HYDROCHLORIDE AND ACETAMINOPHEN 5; 325 MG/1; MG/1
2 TABLET ORAL PRN
Status: DISCONTINUED | OUTPATIENT
Start: 2021-10-05 | End: 2021-10-05 | Stop reason: HOSPADM

## 2021-10-05 RX ORDER — ONDANSETRON 2 MG/ML
4 INJECTION INTRAMUSCULAR; INTRAVENOUS
Status: COMPLETED | OUTPATIENT
Start: 2021-10-05 | End: 2021-10-05

## 2021-10-05 RX ORDER — SODIUM CHLORIDE 0.9 % (FLUSH) 0.9 %
5-40 SYRINGE (ML) INJECTION EVERY 12 HOURS SCHEDULED
Status: DISCONTINUED | OUTPATIENT
Start: 2021-10-05 | End: 2021-10-05 | Stop reason: HOSPADM

## 2021-10-05 RX ORDER — IPRATROPIUM BROMIDE AND ALBUTEROL SULFATE 2.5; .5 MG/3ML; MG/3ML
1 SOLUTION RESPIRATORY (INHALATION) ONCE
Status: COMPLETED | OUTPATIENT
Start: 2021-10-05 | End: 2021-10-05

## 2021-10-05 RX ORDER — SODIUM CHLORIDE 9 MG/ML
INJECTION, SOLUTION INTRAVENOUS CONTINUOUS
Status: DISCONTINUED | OUTPATIENT
Start: 2021-10-05 | End: 2021-10-05 | Stop reason: HOSPADM

## 2021-10-05 RX ORDER — OXYCODONE HYDROCHLORIDE AND ACETAMINOPHEN 5; 325 MG/1; MG/1
1 TABLET ORAL PRN
Status: DISCONTINUED | OUTPATIENT
Start: 2021-10-05 | End: 2021-10-05 | Stop reason: HOSPADM

## 2021-10-05 RX ORDER — APREPITANT 40 MG/1
40 CAPSULE ORAL ONCE
Status: COMPLETED | OUTPATIENT
Start: 2021-10-05 | End: 2021-10-05

## 2021-10-05 RX ORDER — ROCURONIUM BROMIDE 10 MG/ML
INJECTION, SOLUTION INTRAVENOUS PRN
Status: DISCONTINUED | OUTPATIENT
Start: 2021-10-05 | End: 2021-10-05 | Stop reason: SDUPTHER

## 2021-10-05 RX ORDER — HALOPERIDOL 5 MG/ML
1 INJECTION INTRAMUSCULAR ONCE
Status: COMPLETED | OUTPATIENT
Start: 2021-10-05 | End: 2021-10-05

## 2021-10-05 RX ORDER — SODIUM CHLORIDE 0.9 % (FLUSH) 0.9 %
5-40 SYRINGE (ML) INJECTION PRN
Status: DISCONTINUED | OUTPATIENT
Start: 2021-10-05 | End: 2021-10-05 | Stop reason: HOSPADM

## 2021-10-05 RX ORDER — DOCUSATE SODIUM 100 MG/1
100 CAPSULE, LIQUID FILLED ORAL 2 TIMES DAILY PRN
Qty: 60 CAPSULE | Refills: 0 | Status: SHIPPED | OUTPATIENT
Start: 2021-10-05 | End: 2022-06-17

## 2021-10-05 RX ADMIN — ONDANSETRON 4 MG: 2 INJECTION INTRAMUSCULAR; INTRAVENOUS at 10:26

## 2021-10-05 RX ADMIN — DEXAMETHASONE SODIUM PHOSPHATE 10 MG: 4 INJECTION, SOLUTION INTRAMUSCULAR; INTRAVENOUS at 09:14

## 2021-10-05 RX ADMIN — PROPOFOL 120 MG: 10 INJECTION, EMULSION INTRAVENOUS at 09:04

## 2021-10-05 RX ADMIN — CEFAZOLIN 2000 MG: 10 INJECTION, POWDER, FOR SOLUTION INTRAVENOUS at 09:00

## 2021-10-05 RX ADMIN — IPRATROPIUM BROMIDE AND ALBUTEROL SULFATE 1 AMPULE: .5; 2.5 SOLUTION RESPIRATORY (INHALATION) at 13:37

## 2021-10-05 RX ADMIN — HALOPERIDOL LACTATE 1 MG: 5 INJECTION, SOLUTION INTRAMUSCULAR at 12:00

## 2021-10-05 RX ADMIN — FENTANYL CITRATE 25 MCG: 50 INJECTION INTRAMUSCULAR; INTRAVENOUS at 09:00

## 2021-10-05 RX ADMIN — FENTANYL CITRATE 25 MCG: 50 INJECTION INTRAMUSCULAR; INTRAVENOUS at 09:04

## 2021-10-05 RX ADMIN — SUGAMMADEX 200 MG: 100 INJECTION, SOLUTION INTRAVENOUS at 10:08

## 2021-10-05 RX ADMIN — LIDOCAINE HYDROCHLORIDE 100 MG: 20 INJECTION, SOLUTION EPIDURAL; INFILTRATION; INTRACAUDAL; PERINEURAL at 09:04

## 2021-10-05 RX ADMIN — HYDROMORPHONE HYDROCHLORIDE 0.5 MG: 1 INJECTION, SOLUTION INTRAMUSCULAR; INTRAVENOUS; SUBCUTANEOUS at 09:38

## 2021-10-05 RX ADMIN — SODIUM CHLORIDE: 9 INJECTION, SOLUTION INTRAVENOUS at 07:39

## 2021-10-05 RX ADMIN — FENTANYL CITRATE 50 MCG: 50 INJECTION INTRAMUSCULAR; INTRAVENOUS at 09:22

## 2021-10-05 RX ADMIN — ONDANSETRON 4 MG: 2 INJECTION INTRAMUSCULAR; INTRAVENOUS at 09:39

## 2021-10-05 RX ADMIN — HYDROMORPHONE HYDROCHLORIDE 0.5 MG: 1 INJECTION, SOLUTION INTRAMUSCULAR; INTRAVENOUS; SUBCUTANEOUS at 10:01

## 2021-10-05 RX ADMIN — APREPITANT 40 MG: 40 CAPSULE ORAL at 08:01

## 2021-10-05 RX ADMIN — ROCURONIUM BROMIDE 40 MG: 10 INJECTION INTRAVENOUS at 09:04

## 2021-10-05 RX ADMIN — FENTANYL CITRATE 25 MCG: 0.05 INJECTION, SOLUTION INTRAMUSCULAR; INTRAVENOUS at 10:38

## 2021-10-05 ASSESSMENT — PULMONARY FUNCTION TESTS
PIF_VALUE: 26
PIF_VALUE: 30
PIF_VALUE: 26
PIF_VALUE: 23
PIF_VALUE: 22
PIF_VALUE: 25
PIF_VALUE: 20
PIF_VALUE: 30
PIF_VALUE: 27
PIF_VALUE: 26
PIF_VALUE: 20
PIF_VALUE: 25
PIF_VALUE: 30
PIF_VALUE: 28
PIF_VALUE: 29
PIF_VALUE: 28
PIF_VALUE: 6
PIF_VALUE: 15
PIF_VALUE: 29
PIF_VALUE: 25
PIF_VALUE: 22
PIF_VALUE: 28
PIF_VALUE: 21
PIF_VALUE: 1
PIF_VALUE: 0
PIF_VALUE: 16
PIF_VALUE: 4
PIF_VALUE: 24
PIF_VALUE: 30
PIF_VALUE: 31
PIF_VALUE: 4
PIF_VALUE: 20
PIF_VALUE: 30
PIF_VALUE: 11
PIF_VALUE: 18
PIF_VALUE: 30
PIF_VALUE: 16
PIF_VALUE: 18
PIF_VALUE: 20
PIF_VALUE: 27
PIF_VALUE: 21
PIF_VALUE: 16
PIF_VALUE: 28
PIF_VALUE: 0
PIF_VALUE: 29
PIF_VALUE: 26
PIF_VALUE: 29
PIF_VALUE: 30
PIF_VALUE: 21
PIF_VALUE: 26
PIF_VALUE: 7
PIF_VALUE: 21
PIF_VALUE: 28
PIF_VALUE: 29
PIF_VALUE: 26
PIF_VALUE: 26
PIF_VALUE: 29
PIF_VALUE: 20
PIF_VALUE: 27
PIF_VALUE: 26
PIF_VALUE: 29
PIF_VALUE: 29
PIF_VALUE: 26
PIF_VALUE: 27
PIF_VALUE: 30
PIF_VALUE: 1
PIF_VALUE: 27
PIF_VALUE: 30
PIF_VALUE: 27
PIF_VALUE: 30
PIF_VALUE: 22
PIF_VALUE: 25
PIF_VALUE: 27
PIF_VALUE: 30
PIF_VALUE: 16
PIF_VALUE: 20

## 2021-10-05 ASSESSMENT — PAIN - FUNCTIONAL ASSESSMENT: PAIN_FUNCTIONAL_ASSESSMENT: 0-10

## 2021-10-05 ASSESSMENT — PAIN SCALES - GENERAL
PAINLEVEL_OUTOF10: 0
PAINLEVEL_OUTOF10: 6

## 2021-10-05 NOTE — PROGRESS NOTES
Spoke with Dr. Roxanne Garcia. OK to transfer to phase 2. Plan is to get up in chair. See if that will help wake up more and improve oxygenation.

## 2021-10-05 NOTE — H&P
Update History & Physical    The patient's History and Physical from Dr. Joanne Curran on October 4, 2021 was reviewed with the patient and I examined the patient. There was no change. The surgical site was confirmed by the patient and me. Plan: The risks, benefits, expected outcome, and alternative to the recommended procedure have been discussed with the patient. Patient understands and wants to proceed with the procedure. Will proceed with laparoscopic cholecystectomy with cholangiogram, possible open. Ancef ordered. Bilateral SCDs.     Electronically signed by Yolanda Sibley MD on 10/5/2021 at 8:57 AM

## 2021-10-05 NOTE — PROGRESS NOTES
Dr. Dorys Bojorquez came to bedside, pt still with c/o nausea. Addl med to be ordered. IS started with pt to help with O2 sats.

## 2021-10-05 NOTE — ANESTHESIA PRE PROCEDURE
Department of Anesthesiology  Preprocedure Note       Name:  hKloe Miles   Age:  [de-identified] y.o.  :  1940                                          MRN:  [id number]         Date:  10/5/2021      Surgeon: Mare Johnson):  Adalgisa Armstrong MD    Procedure: Procedure(s):  LAPAROSCOPIC CHOLECYSTECTOMY WITH CHOLANGIOGRAM, POSSIBLE OPEN    Medications prior to admission:   Prior to Admission medications    Medication Sig Start Date End Date Taking?  Authorizing Provider   Biotin 3 MG TABS Take 1 capsule by mouth daily  Patient not taking: Reported on 10/4/2021   Yes Historical Provider, MD   amoxicillin-clavulanate (AUGMENTIN) 875-125 MG per tablet Take 1 tablet by mouth 2 times daily for 7 days 9/30/21 10/7/21 Yes Adalgisa Armstrong MD   omeprazole (PRILOSEC) 20 MG delayed release capsule Take 1 capsule by mouth 2 times daily  Patient taking differently: Take 20 mg by mouth Daily  21  Yes Luiz Dacosta MD   propranolol (INDERAL LA) 160 MG extended release capsule TAKE 1 CAPSULE BY MOUTH EVERY DAY 21  Yes Luiz Dacosta MD   hydroCHLOROthiazide (HYDRODIURIL) 25 MG tablet Take 25 mg by mouth daily    Yes Historical Provider, MD   albuterol sulfate  (90 Base) MCG/ACT inhaler INHALE 2 PUFFS INTO THE LUNGS EVERY 4 HOURS AS NEEDED FOR WHEEZING 21  Yes Luiz Dacosta MD   tiZANidine (ZANAFLEX) 4 MG tablet Take 1 tablet by mouth nightly as needed (muscles) 21  Yes Luiz Dacosta MD   atorvastatin (LIPITOR) 10 MG tablet TK 1 T PO D 20  Yes Historical Provider, MD   meclizine (ANTIVERT) 12.5 MG tablet TAKE 1/2 TO 1 TABLET BY MOUTH THREE TIMES DAILY AS NEEDED FOR DIZZINESS OR NAUSEA 6/10/20  Yes Luiz Dacosta MD   rivaroxaban (Jeanie Homero) 20 MG TABS tablet TAKE 1 TABLET BY MOUTH DAILY WITH BREAKFAST  Patient not taking: Reported on 10/4/2021 4/3/20  Yes Luiz Dacosta MD       Current medications:    Current Facility-Administered Medications   Medication Dose Route Frequency Provider Last Rate Last Admin    0.9 % sodium chloride infusion   IntraVENous Continuous Bimal Gonzalez MD 75 mL/hr at 10/05/21 0739 New Bag at 10/05/21 0739    sodium chloride flush 0.9 % injection 5-40 mL  5-40 mL IntraVENous 2 times per day Bimal Gonzalez MD        sodium chloride flush 0.9 % injection 5-40 mL  5-40 mL IntraVENous PRN Bimal Gonzalez MD        0.9 % sodium chloride infusion  25 mL IntraVENous PRN Bimal Gonzalez MD        ceFAZolin (ANCEF) 2000 mg in dextrose 5 % 100 mL IVPB  2,000 mg IntraVENous On Call to [address] MD        aprepitant (EMEND) capsule 40 mg  40 mg Oral Once Bimal Gonzalez MD        fentaNYL (SUBLIMAZE) injection 25 mcg  25 mcg IntraVENous Q5 Min PRN Bimal Gonzalez MD        HYDROmorphone (DILAUDID) injection 0.5 mg  0.5 mg IntraVENous Q5 Min PRN Bimal Gonzalez MD        fentaNYL (SUBLIMAZE) injection 25 mcg  25 mcg IntraVENous Q5 Min PRN Bimal Gonzalez MD        HYDROmorphone (DILAUDID) injection 0.5 mg  0.5 mg IntraVENous Q5 Min PRN Bimal Gonzalez MD        oxyCODONE-acetaminophen (PERCOCET) 5-325 MG per tablet 1 tablet  1 tablet Oral PRN Bimal Gonzalez MD        Or    oxyCODONE-acetaminophen (PERCOCET) 5-325 MG per tablet 2 tablet  2 tablet Oral PRN Bimal Gonzalez MD        ondansetron [facility]) injection 4 mg  4 mg IntraVENous Once PRN Bimal Gonzalez MD           Allergies:     Allergies   Allergen Reactions    Codeine Nausea And Vomiting    Myrbetriq [Mirabegron] Nausea And Vomiting    Vicodin [Hydrocodone-Acetaminophen] Nausea And Vomiting       Problem List:    Patient Active Problem List   Diagnosis Code    GERD (gastroesophageal reflux disease) K21.9    Urge incontinence N39.41    Hyperlipidemia E78.5    Chronic back pain M54.9, G89.29    Osteoporosis- last DEXA nl 1/2012 M81.0    Allergic rhinitis J30.9    Edema R60.9    Generalized osteoarthritis M15.9    Vertigo R42    Tinnitus H93.19    Rotator cuff tear- right M75.100    Benign essential tremor G25.0    Paroxysmal A-fib (HCC) I48.0    Essential hypertension I10    Pleural effusion - rpt CT scan 9/2022 J90    Obstructive apnea G47.33    Asthmatic bronchitis J45.909    Prediabetes R73.03    History of basal cell carcinoma (BCC) Z85.828    Chronic coronary artery disease I25.10    History of total left hip replacement Z96.642    Anemia- nl iron, B12, folate 2018 D64.9    History of knee replacement, total, right Z96.651    Family history of breast cancer in sister Z80.2    Chronic obstructive pulmonary disease, unspecified COPD type ([address] J44.9       Past Medical History:        Diagnosis Date    Allergic rhinitis     Asthma     Benign essential tremor 1/5/2012    Cancer (HCC)     skin cancer    Chronic back pain     Chronic sinusitis     Dizziness     Edema     Generalized osteoarthritis     GERD (gastroesophageal reflux disease)     Hearing loss     Herpes zoster 2010    Hyperlipidemia     Hypertension     Hypertriglyceridemia, essential     Keloid scar     Lung disease     Nonspecific (abnormal) findings on radiological and other examination of skull and head 2010    neg bone scan    Nosebleed     Osteoporosis     DEXA Scan: T -3.7 12/02; -0.5 10/05, 8/08 -0.9    Paroxysmal A-fib ([address] 2/2012    once    PONV (postoperative nausea and vomiting)     AND FAMILY HX ALSO    Rotator cuff tear- right     Screening for cardiovascular condition     4/47 mild diastolic dysfxn    Sleep apnea     Tinnitus     Urge incontinence     Vertigo     Wears glasses        Past Surgical History:        Procedure Laterality Date    BACK SURGERY  1982    BACK SURGERY  2000    epidural    CHEST TUBE INSERTION  2014    Right VATS drainage of loculated effusion, decortication    COLONOSCOPY      HYSTERECTOMY  1983    both ovaries intact    HYSTERECTOMY, TOTAL ABDOMINAL      KNEE ARTHROSCOPY  2003    Right  [facility]      ROTATOR CUFF REPAIR  2011    right    TOTAL HIP ARTHROPLASTY  2012    left    TOTAL KNEE ARTHROPLASTY Right 2018       Social History:    Social History     Tobacco Use    Smoking status: Former Smoker     Packs/day: 0.25     Years: 20.00     Pack years: 5.00     Types: Cigarettes     Quit date: 2014     Years since quittin.0    Smokeless tobacco: Never Used    Tobacco comment: Advised not to resume   Substance Use Topics    Alcohol use: Yes     Comment: socially                                Counseling given: Not Answered  Comment: Advised not to resume      Vital Signs (Current):   Vitals:    10/01/21 1035 10/05/21 0723   BP:  125/72   Pulse:  84   Resp:  16   Temp:  96.9 °F (36.1 °C)   TempSrc:  Temporal   SpO2:  95%   Weight: 208 lb (94.3 kg) 212 lb 6.6 oz (96.3 kg)   Height: 5' 3\" (1.6 m) 5' 3\" (1.6 m)                                              BP Readings from Last 3 Encounters:   10/05/21 125/72   10/04/21 125/80   21 131/86       NPO Status: Time of last liquid consumption:                         Time of last solid consumption:                         Date of last liquid consumption: 10/04/21                        Date of last solid food consumption: 10/04/21    BMI:   Wt Readings from Last 3 Encounters:   10/05/21 212 lb 6.6 oz (96.3 kg)   10/04/21 213 lb (96.6 kg)   21 208 lb (94.3 kg)     Body mass index is 37.63 kg/m².     CBC:   Lab Results   Component Value Date    WBC 10.0 2021    RBC 4.25 2021    HGB 12.5 2021    HCT 37.4 2021    MCV 88.1 2021    RDW 13.7 2021     2021       CMP:   Lab Results   Component Value Date     2021    K 4.2 2021    CL 99 2021    CO2 20 2021    BUN 17 2021    CREATININE 0.8 2021    GFRAA >60 2021    GFRAA >60 2013    AGRATIO 1.3 2021    LABGLOM >60 2021    LABGLOM 98.8 2011    GLUCOSE 95 09/16/2021    GLUCOSE 98 06/16/2011    PROT 6.8 09/16/2021    CALCIUM 9.4 09/16/2021    BILITOT 0.4 09/16/2021    ALKPHOS 108 09/16/2021    AST 24 09/16/2021    ALT 21 09/16/2021       POC Tests: No results for input(s): POCGLU, POCNA, POCK, POCCL, POCBUN, POCHEMO, POCHCT in the last 72 hours. Coags:   Lab Results   Component Value Date    PROTIME 14.3 11/08/2014    INR 1.31 11/08/2014    APTT 28.9 11/08/2014       HCG (If Applicable): No results found for: PREGTESTUR, PREGSERUM, HCG, HCGQUANT     ABGs:   Lab Results   Component Value Date    PHART 7.368 11/08/2014    PO2ART 65.1 11/08/2014    NXD6IKT 41.4 11/08/2014    STF5PMY 23.3 11/08/2014    BEART -1.4 11/08/2014    F5YCBNAG 91.1 11/08/2014        Type & Screen (If Applicable):  No results found for: LABABO, LABRH    Drug/Infectious Status (If Applicable):  No results found for: HIV, HEPCAB    COVID-19 Screening (If Applicable): No results found for: COVID19        Anesthesia Evaluation  Patient summary reviewed   history of anesthetic complications: PONV. Airway: Mallampati: III  TM distance: >3 FB   Neck ROM: limited  Mouth opening: > = 3 FB Dental:      Comment: No loose teeth    Pulmonary: breath sounds clear to auscultation  (+) COPD:  sleep apnea:  asthma:                           ROS comment: Patient states \"scar tissue from pneumonia\"   Cardiovascular:    (+) hypertension:, CAD:, dysrhythmias: atrial fibrillation, hyperlipidemia        Rhythm: irregular  Rate: abnormal  Echocardiogram reviewed  Stress test reviewed  Cleared by cardiology           ROS comment: Study Conclusions     - Left ventricle: Left ventricular geometry shows evidence of     concentric remodeling, with normal ventricular mass and mildly     increased relative wall thickness. Systolic function was normal.     The calculated ejection fraction was 54%. Reason unable to assess     diastolic dysfunction: Due to atrial fibrillation. The stroke     volume is 32ml.  The stroke index is 16ml/m^2. The global     longitudinal strain was -13%. - Left atrium: The atrium is mildly dilated. - Inferior vena cava: The vessel was normal in size; the     respirophasic diameter changes were in the normal range (>= 50%);     findings are consistent with normal central venous pressure. Neuro/Psych:      (-) seizures, neuromuscular disease, TIA and CVA           GI/Hepatic/Renal:   (+) GERD:,      (-) PUD, hepatitis, liver disease and no renal disease       Endo/Other:    (+) blood dyscrasia (last dose xarelto on Saturday): anticoagulation therapy, arthritis:., malignancy/cancer. (-) diabetes mellitus, hypothyroidism               Abdominal:             Vascular: Other Findings:           Anesthesia Plan      general     ASA 3       Induction: intravenous. MIPS: Postoperative opioids intended and Prophylactic antiemetics administered. Anesthetic plan and risks discussed with patient and spouse. Plan discussed with CRNA. This pre-anesthesia assessment may be used as a history and physical.    DOS STAFF ADDENDUM:    Pt seen and examined, chart reviewed (including anesthesia, drug and allergy history). No interval changes to history and physical examination. Anesthetic plan, risks, benefits, alternatives, and personnel involved discussed with patient. Patient verbalized an understanding and agrees to proceed.       Lizett Schroeder MD  October 5, 2021  7:47 AM        Lizett Schroeder MD   10/5/2021

## 2021-10-05 NOTE — PROGRESS NOTES
Pt awakens & converses, confused intermittently but easily reoriented; still c/o pain and nausea, zofran given and fentanyl however O2 sats drop to the 80s when she falls asleep on RA. Spoke with Dr. Whitney Cuevas and no addl nausea meds @ this time, pt needs to wake up more. IV pain meds will also be held until she is more awake.

## 2021-10-05 NOTE — PROGRESS NOTES
Discharge instructions given to patient and , both state understanding and deny having any further questions at this time.

## 2021-10-05 NOTE — ANESTHESIA POSTPROCEDURE EVALUATION
Department of Anesthesiology  Postprocedure Note    Patient: Conrad Stallworth  MRN: [id number]  YOB: 1940  Date of evaluation: 10/5/2021  Time:  3:42 PM     Procedure Summary     Date: 10/05/21 Room / Location: [address] [place] [address]    Anesthesia Start: 0900 Anesthesia Stop: 1025    Procedure: LAPAROSCOPIC CHOLECYSTECTOMY WITH CHOLANGIOGRAM (N/A Abdomen) Diagnosis:       Acute calculous cholecystitis      (acute calculus cholecystitis)    Surgeons: Ferdie Gilford, MD Responsible Provider: Pravin Mendez MD    Anesthesia Type: general ASA Status: 3          Anesthesia Type: general    Samantha Phase I: Samantha Score: 8    Samantha Phase II: Samantha Score: 10    Last vitals: Reviewed and per EMR flowsheets.        Anesthesia Post Evaluation    Patient location during evaluation: PACU  Level of consciousness: awake and alert  Airway patency: patent  Nausea & Vomiting: no nausea and no vomiting  Complications: no  Cardiovascular status: blood pressure returned to baseline  Respiratory status: acceptable  Hydration status: euvolemic  Comments: Postoperative Anesthesia Note    Name:    Conrad Stallworth  MRN:      [id number]    Patient Vitals in the past 12 hrs:  10/05/21 1441, BP:137/76, Temp:97 °F (36.1 °C), Temp src:Oral, Pulse:86, Resp:16, SpO2:95 %  10/05/21 1430, BP:124/72, Pulse:85, Resp:14, SpO2:93 %  10/05/21 1415, BP:135/74, Pulse:91, Resp:11, SpO2:93 %  10/05/21 1400, BP:128/84, Pulse:92, Resp:11, SpO2:92 %  10/05/21 1345, BP:(!) 104/91, Pulse:98, Resp:19, SpO2:99 %  10/05/21 1340, Resp:18, SpO2:96 %  10/05/21 1330, BP:(!) 146/89, Pulse:98, Resp:16, SpO2:95 %  10/05/21 1315, BP:129/81, Pulse:97, Resp:12, SpO2:95 %  10/05/21 1300, BP:126/74, Pulse:97, Resp:10, SpO2:94 %  10/05/21 1245, BP:122/67, Pulse:87, Resp:10, SpO2:93 %  10/05/21 1230, BP:(!) 141/60, Pulse:101, Resp:13, SpO2:94 %  10/05/21 1215, BP:134/65, Pulse:96, Resp:10, SpO2:92 %  10/05/21 1200, BP:131/80, Temp:96.7 °F (35.9 °C), Temp src:Temporal, Pulse:97, Resp:17, SpO2:95 %  10/05/21 1145, BP:123/88, Pulse:98, Resp:15, SpO2:92 %  10/05/21 1130, BP:(!) 163/85, Pulse:98, Resp:19, SpO2:96 %  10/05/21 1115, BP:123/79, Pulse:90, Resp:18, SpO2:96 %  10/05/21 1100, BP:(!) 147/80, Pulse:90, Resp:22, SpO2:98 %  10/05/21 1045, BP:(!) 162/90, Pulse:102, Resp:16, SpO2:92 %  10/05/21 1040, BP:(!) 157/79, Pulse:96, Resp:14, SpO2:93 %  10/05/21 1035, BP:(!) 155/77, Pulse:99, Resp:19, SpO2:98 %  10/05/21 1030, BP:(!) 151/90, Pulse:114, Resp:11, SpO2:98 %  10/05/21 1025, BP:(!) 154/77, Pulse:119, Resp:9, SpO2:97 %  10/05/21 1019, BP:(!) 168/89, Temp:97.2 °F (36.2 °C), Temp src:Temporal, Pulse:114, Resp:10, SpO2:97 %  10/05/21 0723, BP:125/72, Temp:96.9 °F (36.1 °C), Temp src:Temporal, Pulse:84, Resp:16, SpO2:95 %, Height:5' 3\" (1.6 m), Weight:212 lb 6.6 oz (96.3 kg)     LABS:    CBC  Lab Results       Component                Value               Date/Time                  WBC                      10.0                09/16/2021 11:52 AM        HGB                      12.5                09/16/2021 11:52 AM        HCT                      37.4                09/16/2021 11:52 AM        PLT                      284                 09/16/2021 11:52 AM   RENAL  Lab Results       Component                Value               Date/Time                  NA                       140                 09/16/2021 11:52 AM        K                        4.2                 09/16/2021 11:52 AM        CL                       99                  09/16/2021 11:52 AM        CO2                      20 (L)              09/16/2021 11:52 AM        BUN                      17                  09/16/2021 11:52 AM        CREATININE               0.8                 09/16/2021 11:52 AM        GLUCOSE                  95                  09/16/2021 11:52 AM        GLUCOSE                  98                  06/16/2011 10:30 AM   COAGS  Lab Results Component                Value               Date/Time                  PROTIME                  14.3 (H)            11/08/2014 12:25 AM        INR                      1.31 (H)            11/08/2014 12:25 AM        APTT                     28.9                11/08/2014 12:25 AM     Intake & Output:  @40DKUG@    Nausea & Vomiting:  No    Level of Consciousness:  Awake    Pain Assessment:  Adequate analgesia    Anesthesia Complications:  No apparent anesthetic complications    SUMMARY      Vital signs stable  OK to discharge from Stage I post anesthesia care.   Care transferred from Anesthesiology department on discharge from perioperative area

## 2021-10-05 NOTE — PROGRESS NOTES
Dr. Ko Martinez from anesthesia here to see. Continues with O2 at 1L.  When on room air sat drops to 88%

## 2021-10-05 NOTE — OP NOTE
Laparoscopic Cholecystectomy Operative Report      Patient: Ronak Garcia MRN: [id number]     YOB: 1940  Age: [de-identified] y.o. Sex: female        Primary Care Physician: Joaquín Salas MD         DATE OF OPERATION: 10/5/2021     PREOPERATIVE DIAGNOSIS: acute calculous cholecystitis    POSTOPERATIVE DIAGNOSIS: same    PROCEDURE PERFORMED: Laparoscopic cholecystectomy with cholangiogram    SURGEON: Rosa Maria Esteves MD    ANESTHESIA: General endotracheal    ASA CLASS: 3    DVT PROPHYLAXIS: bilateral SCDs    ANTIBIOTICS: ancef 2 g IV preoperatively    INDICATIONS: Ronak Garcia presented with a history of RUQ Pain. She had an ultrasound that showed gallstones. A CT scan showed gallstones, gallbladder wall thickening, and pericholecystic fluid. The patient's symptoms were felt to be secondary to acute calculous cholecystitis. She was treated with oral antibiotics and she presented electively for removal after the risks, benefits and alternatives were discussed with her. Procedure Details:       After informed consent was obtained, the patient was brought to the operating room and placed on the table in the supine position. Preoperative antibiotics were given. Once under general endotracheal anesthesia, the abdomen was prepped and sterilely draped in the standard fashion. A time-out was performed for patient and procedure verification. A small vertical incision was made just at the umbilicus and a Veress needle inserted into peritoneal cavity. The abdomen was insufflated with carbon dioxide to a pressure of 15 mmHg. A 5mm trocar was introduced and a camera placed. Under direct vision, a 10mm port was placed in the subxiphoid location and two 5 mm ports placed in the right upper quadrant. The dome of the gallbladder was grasped and elevated up and over the liver. The patient had significant inflammation and adhesions. Fortunately the adhesions were fairly flimsy and came down without difficulty. We bluntly took down the peritoneum over the infundibulum of the gallbladder. The infundibulum was then grasped and retracted laterally exposing the triangle of [place]ot. We performed our dissection until the anatomy was very clear as we could see two and only two structures entering the gallbladder and completing our critical view of safety. A clip was placed on the gallbladder side of the cystic duct. A small stab incision was made just below the costal margin in the RUQ and the cholangiogram catheter was introduced. A ductotomy was made in the cystic duct and the catheter inserted. Cholangiogram showed contrast flow into the duodenum. Common bile duct, common hepatic duct, cystic duct and junction of the left and right hepatic ducts were well visualized. No filling defects were noted. The catheter was removed. The cystic duct was further cleared circumferentially was clipped proximally with 3 clips and divided. The artery was likewise identified, clipped and divided. The gallbladder was removed from the liver bed with cautery and removed through the epigastric port site. The abdomen was reinspected. There was minimal oozing of the gallbladder fossa that was controlled with electrocautery. The epigastric trocar site fascia was closed with a figure of eight suture of 0-Vicryl using a laparoscopic suture passer. The trocars were withdrawn and the skin closed with 4-0 Vicryl. Skin affix was applied after injecting local anesthetic. The patient was awoken and extubated without complication. All instrument counts were said to be correct.         Findings: acute calculous cholecystitis, normal cholangiogram    Estimated Blood Loss: less than 50 ml    Complications: none           Specimen: gallbladder and contents                  Electronically signed by Ronit Mcbride MD on 10/5/2021 at 10:15 AM

## 2021-10-21 ENCOUNTER — OFFICE VISIT (OUTPATIENT)
Dept: SURGERY | Age: 81
End: 2021-10-21

## 2021-10-21 VITALS
DIASTOLIC BLOOD PRESSURE: 83 MMHG | HEART RATE: 88 BPM | WEIGHT: 211 LBS | SYSTOLIC BLOOD PRESSURE: 117 MMHG | BODY MASS INDEX: 37.38 KG/M2

## 2021-10-21 DIAGNOSIS — K80.00 ACUTE CALCULOUS CHOLECYSTITIS: Primary | ICD-10-CM

## 2021-10-21 DIAGNOSIS — Z90.49 S/P LAPAROSCOPIC CHOLECYSTECTOMY: ICD-10-CM

## 2021-10-21 PROCEDURE — 99024 POSTOP FOLLOW-UP VISIT: CPT | Performed by: SURGERY

## 2021-10-21 NOTE — PROGRESS NOTES
Post Operative Visit    [facility]  [place] [facility]   Raymond Wolf MD    [address]  [place] [address]  Phone: [phone]  Fax: [phone]   YOB: 1940    Date of Visit:  10/21/2021    No ref. provider found  Robert Jones MD    HPI:     Gallbladder: Chelsea Keller is [de-identified] y.o. female presenting for her 2 week follow up visit after laparoscopic cholecystectomy, which was performed for acute calculous cholecystitis. Overall her pain has improved. She is eating and drinking without difficulty. BMs are normal.  No jaundice. No fevers or chills. Pain Score:   6    Vitals:    10/21/21 1050   BP: 117/83   Pulse: 88   Weight: 211 lb (95.7 kg)     Body mass index is 37.38 kg/m². PHYSICAL EXAM:    CONSTITUTIONAL:  alert, no apparent distress  LUNGS:  Resp easy and unlabored  ABDOMEN:  Incisions healing well, no erythema or induration, soft, non-distended, non-tender, voluntary guarding absent, and hernia absent  MUSCULOSKELETAL: No edema  NEUROLOGIC:  Mental Status Exam:  Level of Alertness:   awake  Orientation:   person, place, time        Pathology:  FINAL DIAGNOSIS:     Gallbladder, cholecystectomy:   - Acute cholecystitis, marked. - Cholelithiasis.     JIAJA/ALICIA     ASSESSMENT:     Diagnosis Orders   1. Acute calculous cholecystitis     2. S/P laparoscopic cholecystectomy           PLAN:    Overall Chelsea Keller is doing well. Abdominal pain has improved. She is tolerating a regular diet with normal bowel function. Incisions are healing well. Continue lifting restrictions for 4 more weeks. Will have her follow up prn.       Electronically signed by Jarrell Gtz MD on 10/21/2021

## 2022-02-16 ENCOUNTER — OFFICE VISIT (OUTPATIENT)
Dept: FAMILY MEDICINE CLINIC | Age: 82
End: 2022-02-16
Payer: COMMERCIAL

## 2022-02-16 VITALS
RESPIRATION RATE: 18 BRPM | OXYGEN SATURATION: 92 % | SYSTOLIC BLOOD PRESSURE: 118 MMHG | BODY MASS INDEX: 37.56 KG/M2 | HEIGHT: 63 IN | HEART RATE: 61 BPM | WEIGHT: 212 LBS | DIASTOLIC BLOOD PRESSURE: 72 MMHG

## 2022-02-16 DIAGNOSIS — I25.10 CHRONIC CORONARY ARTERY DISEASE: ICD-10-CM

## 2022-02-16 DIAGNOSIS — R73.03 PREDIABETES: ICD-10-CM

## 2022-02-16 DIAGNOSIS — I10 ESSENTIAL HYPERTENSION: Chronic | ICD-10-CM

## 2022-02-16 DIAGNOSIS — D64.9 ANEMIA, UNSPECIFIED TYPE: ICD-10-CM

## 2022-02-16 DIAGNOSIS — J44.9 CHRONIC OBSTRUCTIVE PULMONARY DISEASE, UNSPECIFIED COPD TYPE (HCC): ICD-10-CM

## 2022-02-16 DIAGNOSIS — E78.2 MIXED HYPERLIPIDEMIA: ICD-10-CM

## 2022-02-16 DIAGNOSIS — R35.0 URINARY FREQUENCY: ICD-10-CM

## 2022-02-16 DIAGNOSIS — I48.0 PAROXYSMAL A-FIB (HCC): ICD-10-CM

## 2022-02-16 DIAGNOSIS — Z00.00 MEDICARE ANNUAL WELLNESS VISIT, SUBSEQUENT: Primary | ICD-10-CM

## 2022-02-16 LAB
BILIRUBIN, POC: NEGATIVE
BLOOD URINE, POC: NORMAL
CLARITY, POC: NORMAL
COLOR, POC: YELLOW
GLUCOSE URINE, POC: NEGATIVE
KETONES, POC: NEGATIVE
LEUKOCYTE EST, POC: NORMAL
NITRITE, POC: NEGATIVE
PH, POC: 6
PROTEIN, POC: NORMAL
SPECIFIC GRAVITY, POC: 1.02
UROBILINOGEN, POC: NORMAL

## 2022-02-16 PROCEDURE — 99214 OFFICE O/P EST MOD 30 MIN: CPT | Performed by: FAMILY MEDICINE

## 2022-02-16 PROCEDURE — G0439 PPPS, SUBSEQ VISIT: HCPCS | Performed by: FAMILY MEDICINE

## 2022-02-16 PROCEDURE — 81002 URINALYSIS NONAUTO W/O SCOPE: CPT | Performed by: FAMILY MEDICINE

## 2022-02-16 ASSESSMENT — PATIENT HEALTH QUESTIONNAIRE - PHQ9
2. FEELING DOWN, DEPRESSED OR HOPELESS: 0
1. LITTLE INTEREST OR PLEASURE IN DOING THINGS: 0
SUM OF ALL RESPONSES TO PHQ QUESTIONS 1-9: 0
SUM OF ALL RESPONSES TO PHQ9 QUESTIONS 1 & 2: 0

## 2022-02-16 ASSESSMENT — LIFESTYLE VARIABLES
HOW OFTEN DO YOU HAVE A DRINK CONTAINING ALCOHOL: MONTHLY OR LESS
HOW MANY STANDARD DRINKS CONTAINING ALCOHOL DO YOU HAVE ON A TYPICAL DAY: 1 OR 2

## 2022-02-16 NOTE — PATIENT INSTRUCTIONS
FYI: While Medicare provides you with a FREE ANNUAL PREVENTIVE PHYSICAL, this visit does NOT include management of chronic medical problems or physical examination. Dr. Moi Bello usually does a combination visit if you have other medical problems so you don't have to come back for another visit. However, this means that there will be a co-pay. INSTRUCTIONS  NEXT APPOINTMENT: Please schedule check-up in 6 months. · PLEASE TAKE THIS FORM TO CHECK-OUT WINDOW TO SCHEDULE NEXT VISIT. · Since you are taking a CONTROLLED MEDICATION, will need to be seen at least every 6 months AND will need to have a future appointment scheduled for any refills. Be sure to schedule on way out of office today to avoid denial of future refills. PLEASE GET FASTING BLOODWORK DRAWN SOON. Lab is on first floor in [address]. Hours Monday to Friday 6:30 AM to 4 PM.   · Please get mammogram soon to screen for breast cancer. To schedule at a [facility], please call [phone]. · Get back to exercise and better diet. · Look into making POA and living will. Patient Education     STRESS: HOW TO BETTER COPE    What causes stress? Feelings of stress are caused by the body's instinct to defend itself. This instinct is good in emergencies, such as getting out of the way of a speeding car. But stress can cause unhealthy physical symptoms if it goes on for too long, such as in response to life's daily challenges and changes. When this happens, it's as though your body gets ready to jump out of the way of the car, but you're sitting still. Your body is working overtime, with no place to put all the extra energy. This can make you feel anxious, afraid, worried and uptight. What changes may be stressful? Any sort of change can make you feel stressed, even good change. It's not just the change or event itself, but also how you react to it that matters. What's stressful is different for each person.  For example, one person may feel stressed by retiring from work, while someone else may not. Other things that may be stressful include being laid off from your job, your child leaving or returning home, the death of your spouse, divorce or marriage, an illness, an injury, a job promotion, money problems, moving, or having a baby. Can stress hurt my health? Stress can cause health problems or make health problems worse. Talk to your family doctor if you think some of your symptoms are caused by stress. It's important to make sure that your symptoms aren't caused by other health problems. Possible signs of stress  Anxiety   Back pain   Constipation or diarrhea   Depression   Fatigue   Headaches   High blood pressure   Trouble sleeping or insomnia   Problems with relationships   Shortness of breath   Stiff neck or jaw   Upset stomach   Weight gain or loss     What can I do to manage my stress? The first step is to learn to recognize when you're feeling stressed. Early warning signs of stress include tension in your shoulders and neck, or clenching your hands into fists. The next step is to choose a way to deal with your stress. One way is to avoid the event or thing that leads to your stress--but often this is not possible. A second way is to change how you react to stress. This is often the more practical way. Tips for dealing with stress  Don't worry about things you can't control, such as the weather. Solve the little problems. This can help you gain a feeling of control. Prepare to the best of your ability for events you know may be stressful, such as a job interview. Try to look at change as a positive challenge, not as a threat. Work to resolve conflicts with other people. Talk with a trusted friend, family member or counselor. Set realistic goals at home and at work. Avoid overscheduling. Exercise on a regular basis. Eat regular, well-balanced meals and get enough sleep. Meditate.    Participate in something you don't find stressful, such as sports, social events or hobbies. Why is exercise useful? Exercise is a good way to deal with stress because it's a healthy way to relieve your pent-up energy and tension. Exercise is known to release feel-good brain chemicals. It also helps you get in better shape, which makes you feel better overall. Steps to deep breathing  Lie down on a flat surface. Place a hand on your stomach, just above your navel. Place the other hand on your chest.   Breathe in slowly and try to make your stomach rise a little. Hold your breath for a second. Breathe out slowly and let your stomach go back down. What is meditation? Meditation is a form of guided thought. It can take many forms. You can do it with exercise that uses the same motions over and over, like walking or swimming. You can meditate by practicing relaxation training, by stretching or by breathing deeply. Relaxation training is simple. Start with one muscle. Hold it tight for a few seconds then relax the muscle. Do this with each of your muscles, beginning with the toes and feet and working your way up through the rest of your body, one muscle group at a time. Stretching can also help relieve tension. Roll your head in a gentle [place]. Reach toward the ceiling and bend side to side slowly. Roll your shoulders. Deep, relaxed breathing by itself may help relieve stress (see the box to the right). This helps you get plenty of oxygen and activates the relaxation response, the bodys antidote to stress. Personalized Preventive Plan for José Miguel Thomas - 2/16/2022  Medicare offers a range of preventive health benefits. Some of the tests and screenings are paid in full while other may be subject to a deductible, co-insurance, and/or copay.     Some of these benefits include a comprehensive review of your medical history including lifestyle, illnesses that may run in your family, and various assessments and screenings as
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
## 2022-02-18 LAB
ORGANISM: ABNORMAL
URINE CULTURE, ROUTINE: ABNORMAL

## 2022-02-22 DIAGNOSIS — I48.0 PAROXYSMAL A-FIB (HCC): ICD-10-CM

## 2022-02-22 DIAGNOSIS — I10 ESSENTIAL HYPERTENSION: ICD-10-CM

## 2022-02-22 RX ORDER — PROPRANOLOL HYDROCHLORIDE 160 MG/1
CAPSULE, EXTENDED RELEASE ORAL
Qty: 90 CAPSULE | Refills: 0 | Status: SHIPPED | OUTPATIENT
Start: 2022-02-22 | End: 2022-05-23

## 2022-03-01 DIAGNOSIS — I10 ESSENTIAL HYPERTENSION: Chronic | ICD-10-CM

## 2022-03-01 DIAGNOSIS — E78.2 MIXED HYPERLIPIDEMIA: ICD-10-CM

## 2022-03-01 DIAGNOSIS — R73.03 PREDIABETES: ICD-10-CM

## 2022-03-01 DIAGNOSIS — D64.9 ANEMIA, UNSPECIFIED TYPE: ICD-10-CM

## 2022-03-01 LAB
A/G RATIO: 1.4 (ref 1.1–2.2)
ALBUMIN SERPL-MCNC: 4 G/DL (ref 3.4–5)
ALP BLD-CCNC: 92 U/L (ref 40–129)
ALT SERPL-CCNC: 16 U/L (ref 10–40)
ANION GAP SERPL CALCULATED.3IONS-SCNC: 17 MMOL/L (ref 3–16)
AST SERPL-CCNC: 20 U/L (ref 15–37)
BASOPHILS ABSOLUTE: 0.1 K/UL (ref 0–0.2)
BASOPHILS RELATIVE PERCENT: 1 %
BILIRUB SERPL-MCNC: 0.4 MG/DL (ref 0–1)
BUN BLDV-MCNC: 17 MG/DL (ref 7–20)
CALCIUM SERPL-MCNC: 9.2 MG/DL (ref 8.3–10.6)
CHLORIDE BLD-SCNC: 102 MMOL/L (ref 99–110)
CHOLESTEROL, TOTAL: 151 MG/DL (ref 0–199)
CO2: 22 MMOL/L (ref 21–32)
CREAT SERPL-MCNC: 0.8 MG/DL (ref 0.6–1.2)
EOSINOPHILS ABSOLUTE: 0.1 K/UL (ref 0–0.6)
EOSINOPHILS RELATIVE PERCENT: 1 %
ESTIMATED AVERAGE GLUCOSE: 119.8 MG/DL
GFR AFRICAN AMERICAN: >60
GFR NON-AFRICAN AMERICAN: >60
GLUCOSE BLD-MCNC: 99 MG/DL (ref 70–99)
HBA1C MFR BLD: 5.8 %
HCT VFR BLD CALC: 39 % (ref 36–48)
HDLC SERPL-MCNC: 40 MG/DL (ref 40–60)
HEMOGLOBIN: 12.8 G/DL (ref 12–16)
LDL CHOLESTEROL CALCULATED: 78 MG/DL
LYMPHOCYTES ABSOLUTE: 2.2 K/UL (ref 1–5.1)
LYMPHOCYTES RELATIVE PERCENT: 27 %
MCH RBC QN AUTO: 29.5 PG (ref 26–34)
MCHC RBC AUTO-ENTMCNC: 32.9 G/DL (ref 31–36)
MCV RBC AUTO: 89.6 FL (ref 80–100)
MONOCYTES ABSOLUTE: 0.1 K/UL (ref 0–1.3)
MONOCYTES RELATIVE PERCENT: 1 %
MYELOCYTE PERCENT: 1 %
NEUTROPHILS ABSOLUTE: 5.8 K/UL (ref 1.7–7.7)
NEUTROPHILS RELATIVE PERCENT: 69 %
PDW BLD-RTO: 14 % (ref 12.4–15.4)
PLATELET # BLD: 223 K/UL (ref 135–450)
PLATELET SLIDE REVIEW: ADEQUATE
PMV BLD AUTO: 8.9 FL (ref 5–10.5)
POTASSIUM SERPL-SCNC: 4.5 MMOL/L (ref 3.5–5.1)
RBC # BLD: 4.35 M/UL (ref 4–5.2)
RBC # BLD: NORMAL 10*6/UL
SLIDE REVIEW: ABNORMAL
SODIUM BLD-SCNC: 141 MMOL/L (ref 136–145)
TOTAL PROTEIN: 6.8 G/DL (ref 6.4–8.2)
TRIGL SERPL-MCNC: 163 MG/DL (ref 0–150)
VLDLC SERPL CALC-MCNC: 33 MG/DL
WBC # BLD: 8.3 K/UL (ref 4–11)

## 2022-03-16 DIAGNOSIS — G89.29 CHRONIC BILATERAL LOW BACK PAIN WITHOUT SCIATICA: ICD-10-CM

## 2022-03-16 DIAGNOSIS — M54.50 CHRONIC BILATERAL LOW BACK PAIN WITHOUT SCIATICA: ICD-10-CM

## 2022-03-16 RX ORDER — TIZANIDINE 4 MG/1
TABLET ORAL
Qty: 90 TABLET | Refills: 1 | Status: SHIPPED | OUTPATIENT
Start: 2022-03-16 | End: 2022-10-18

## 2022-04-13 ENCOUNTER — HOSPITAL ENCOUNTER (OUTPATIENT)
Dept: WOMENS IMAGING | Age: 82
Discharge: HOME | End: 2022-04-13
Payer: MEDICARE

## 2022-04-13 DIAGNOSIS — Z12.31 OTHER SCREENING MAMMOGRAM: ICD-10-CM

## 2022-04-13 PROCEDURE — 77063 BREAST TOMOSYNTHESIS BI: CPT

## 2022-05-23 DIAGNOSIS — I48.0 PAROXYSMAL A-FIB (HCC): ICD-10-CM

## 2022-05-23 DIAGNOSIS — I10 ESSENTIAL HYPERTENSION: ICD-10-CM

## 2022-05-23 RX ORDER — PROPRANOLOL HYDROCHLORIDE 160 MG/1
CAPSULE, EXTENDED RELEASE ORAL
Qty: 90 CAPSULE | Refills: 1 | Status: SHIPPED | OUTPATIENT
Start: 2022-05-23

## 2022-06-06 ENCOUNTER — OFFICE VISIT (OUTPATIENT)
Dept: FAMILY MEDICINE CLINIC | Age: 82
End: 2022-06-06
Payer: MEDICARE

## 2022-06-06 VITALS
SYSTOLIC BLOOD PRESSURE: 114 MMHG | OXYGEN SATURATION: 95 % | HEART RATE: 92 BPM | DIASTOLIC BLOOD PRESSURE: 66 MMHG | TEMPERATURE: 98.1 F | RESPIRATION RATE: 16 BRPM

## 2022-06-06 DIAGNOSIS — J06.9 URI WITH COUGH AND CONGESTION: Primary | ICD-10-CM

## 2022-06-06 DIAGNOSIS — J06.9 URI WITH COUGH AND CONGESTION: ICD-10-CM

## 2022-06-06 PROCEDURE — 1123F ACP DISCUSS/DSCN MKR DOCD: CPT | Performed by: NURSE PRACTITIONER

## 2022-06-06 PROCEDURE — 99213 OFFICE O/P EST LOW 20 MIN: CPT | Performed by: NURSE PRACTITIONER

## 2022-06-06 NOTE — PROGRESS NOTES
6/6/2022    This is a 80 y.o. female   Chief Complaint   Patient presents with    Congestion      started last night. congestion. sinus pressure and drainage. scratchy throat. chills. fatigue. was exposed to covid over memorial day weekend   . URI   This is a new problem. The current episode started yesterday. The problem has been gradually worsening. There has been no fever. Associated symptoms include congestion, coughing, rhinorrhea, sinus pain and a sore throat. Pertinent negatives include no abdominal pain, chest pain, diarrhea, ear pain, headaches, nausea, swollen glands, vomiting or wheezing. Treatments tried: dayquil. The treatment provided mild relief. Exposed to COVID-19 on 5/31.  started to feel ill last week. He went to urgent care and he was negative for COVID-19. Has been vaccinated against COVID-19. Reports that she started with sinus congestion last night and chills. Denies fever. Did take tylenol last night. Positive for sore throat, rhinorrhea, PND. Has been taking dayquil with some improvement in symptoms.       Patient Active Problem List   Diagnosis    GERD (gastroesophageal reflux disease)    Urge incontinence    Hyperlipidemia    Chronic back pain    Osteoporosis- last DEXA nl 1/2012    Allergic rhinitis    Edema    Generalized osteoarthritis    Vertigo    Tinnitus    Rotator cuff tear- right    Benign essential tremor    Paroxysmal A-fib ([address]    Essential hypertension    Pleural effusion - rpt CT scan 9/2022    Obstructive apnea    Asthmatic bronchitis    Prediabetes    History of basal cell carcinoma (BCC)    Chronic coronary artery disease    History of total left hip replacement    Anemia- nl iron, B12, folate 2018    History of knee replacement, total, right    Family history of breast cancer in sister    Chronic obstructive pulmonary disease, unspecified COPD type ([address]          Current Outpatient Medications   Medication Sig Dispense Refill    propranolol (INDERAL LA) 160 MG extended release capsule TAKE 1 CAPSULE BY MOUTH EVERY DAY 90 capsule 1    tiZANidine (ZANAFLEX) 4 MG tablet TAKE 1 TABLET BY MOUTH EVERY EVENING AS NEEDED 90 tablet 1    Biotin 3 MG TABS Take 1 capsule by mouth daily       omeprazole (PRILOSEC) 20 MG delayed release capsule Take 1 capsule by mouth 2 times daily (Patient taking differently: Take 20 mg by mouth Daily ) 60 capsule 11    hydroCHLOROthiazide (HYDRODIURIL) 25 MG tablet Take 25 mg by mouth daily       albuterol sulfate  (90 Base) MCG/ACT inhaler INHALE 2 PUFFS INTO THE LUNGS EVERY 4 HOURS AS NEEDED FOR WHEEZING 6.7 g 3    atorvastatin (LIPITOR) 10 MG tablet TK 1 T PO D      meclizine (ANTIVERT) 12.5 MG tablet TAKE 1/2 TO 1 TABLET BY MOUTH THREE TIMES DAILY AS NEEDED FOR DIZZINESS OR NAUSEA 30 tablet 1    rivaroxaban (XARELTO) 20 MG TABS tablet TAKE 1 TABLET BY MOUTH DAILY WITH BREAKFAST 90 tablet 1    docusate sodium (COLACE) 100 MG capsule Take 1 capsule by mouth 2 times daily as needed for Constipation (take while on narcotic pain medication) 60 capsule 0     No current facility-administered medications for this visit. Allergies   Allergen Reactions    Codeine Nausea And Vomiting    Myrbetriq [Mirabegron] Nausea And Vomiting    Vicodin [Hydrocodone-Acetaminophen] Nausea And Vomiting       Review of Systems   Constitutional: Positive for activity change, chills and fatigue. Negative for fever. HENT: Positive for congestion, postnasal drip, rhinorrhea, sinus pain and sore throat. Negative for ear pain. Respiratory: Positive for cough. Negative for wheezing. Cardiovascular: Negative for chest pain, palpitations and leg swelling. Gastrointestinal: Negative for abdominal pain, diarrhea, nausea and vomiting. Neurological: Negative for dizziness, weakness and headaches.        Vitals:    06/06/22 1458   BP: 114/66   Site: Right Upper Arm   Position: Sitting   Cuff Size: Large Adult Pulse: 92   Resp: 16   Temp: 98.1 °F (36.7 °C)   TempSrc: Oral   SpO2: 95%       There is no height or weight on file to calculate BMI. Wt Readings from Last 3 Encounters:   02/16/22 212 lb (96.2 kg)   10/21/21 211 lb (95.7 kg)   10/05/21 212 lb 6.6 oz (96.3 kg)       BP Readings from Last 3 Encounters:   06/06/22 114/66   02/16/22 118/72   10/21/21 117/83       Physical Exam  Vitals and nursing note reviewed. Constitutional:       General: She is not in acute distress. Appearance: She is well-developed. She is obese. HENT:      Head: Normocephalic and atraumatic. Right Ear: Tympanic membrane, ear canal and external ear normal. Tympanic membrane is not erythematous or bulging. Left Ear: Tympanic membrane, ear canal and external ear normal. Tympanic membrane is not erythematous or bulging. Nose:      Right Sinus: Maxillary sinus tenderness present. No frontal sinus tenderness. Left Sinus: Maxillary sinus tenderness present. No frontal sinus tenderness. Mouth/Throat:      Pharynx: Uvula midline. Posterior oropharyngeal erythema present. No oropharyngeal exudate. Cardiovascular:      Rate and Rhythm: Normal rate and regular rhythm. Heart sounds: Normal heart sounds. No murmur heard. No friction rub. No gallop. Pulmonary:      Effort: Pulmonary effort is normal. No respiratory distress. Breath sounds: Normal breath sounds. Musculoskeletal:      Cervical back: Neck supple. Right lower leg: No edema. Left lower leg: No edema. Lymphadenopathy:      Head:      Right side of head: No submandibular adenopathy. Left side of head: No submandibular adenopathy. Cervical:      Right cervical: No superficial cervical adenopathy. Left cervical: No superficial cervical adenopathy. Skin:     General: Skin is warm and dry. Neurological:      Mental Status: She is alert and oriented to person, place, and time.    Psychiatric:         Behavior: Behavior normal.         Ernesto Houser was seen today for congestion. Diagnoses and all orders for this visit:    URI with cough and congestion  -     COVID-19; Future  Advised to rest and hydrated with water. Symptoms are currently being controlled with dayquil. Can continue. Advised to quarantine for at least 5 days or until feeling better. Would need to wear a mask around others for at least 10 days. Patient is to call if symptoms worsen or fail to improve within the week. Return if symptoms worsen or fail to improve.

## 2022-06-07 DIAGNOSIS — U07.1 COVID-19 VIRUS INFECTION: Primary | ICD-10-CM

## 2022-06-07 LAB — SARS-COV-2: DETECTED

## 2022-06-07 ASSESSMENT — ENCOUNTER SYMPTOMS
DIARRHEA: 0
COUGH: 1
SINUS PAIN: 1
RHINORRHEA: 1
NAUSEA: 0
VOMITING: 0
SWOLLEN GLANDS: 0
WHEEZING: 0
SORE THROAT: 1
ABDOMINAL PAIN: 0

## 2022-06-17 ENCOUNTER — TELEPHONE (OUTPATIENT)
Dept: FAMILY MEDICINE CLINIC | Age: 82
End: 2022-06-17

## 2022-06-17 ENCOUNTER — OFFICE VISIT (OUTPATIENT)
Dept: FAMILY MEDICINE CLINIC | Age: 82
End: 2022-06-17
Payer: MEDICARE

## 2022-06-17 VITALS
RESPIRATION RATE: 18 BRPM | SYSTOLIC BLOOD PRESSURE: 120 MMHG | BODY MASS INDEX: 37.55 KG/M2 | DIASTOLIC BLOOD PRESSURE: 80 MMHG | TEMPERATURE: 98.4 F | HEIGHT: 63 IN | HEART RATE: 92 BPM | OXYGEN SATURATION: 96 %

## 2022-06-17 DIAGNOSIS — J01.90 ACUTE BACTERIAL SINUSITIS: Primary | ICD-10-CM

## 2022-06-17 DIAGNOSIS — B96.89 ACUTE BACTERIAL SINUSITIS: Primary | ICD-10-CM

## 2022-06-17 PROCEDURE — 99213 OFFICE O/P EST LOW 20 MIN: CPT

## 2022-06-17 PROCEDURE — 1123F ACP DISCUSS/DSCN MKR DOCD: CPT

## 2022-06-17 RX ORDER — AMOXICILLIN AND CLAVULANATE POTASSIUM 875; 125 MG/1; MG/1
1 TABLET, FILM COATED ORAL 2 TIMES DAILY
Qty: 20 TABLET | Refills: 0 | Status: SHIPPED | OUTPATIENT
Start: 2022-06-17 | End: 2022-06-27

## 2022-06-17 NOTE — TELEPHONE ENCOUNTER
Pt called in and wants to know if an antibiotic can be sent in   Pt said he was diagnosed with covid but it has been over 10 days   Pt said it feels like a sinus infection more now   Please advise

## 2022-06-17 NOTE — PROGRESS NOTES
6/17/2022    This is a 80 y.o. female   Chief Complaint   Patient presents with    Sinus Problem     Pt had covid 06/06/2022. Pt think that it has turned into a sinus infection. Aldo ECHAVARRIA   Suha Ragsdale is here today with complaints of increased sinus pressure and congestion with cough for past 10 days. She denies fever, SOB, or difficulty breathing. She has been taking tylenol with minimal relief. She was diagnosed with COVID 19 infection on 6/6/22 and treated with molnuiravir with no relief in symptoms. Albuterol- has not needed.      Patient Active Problem List   Diagnosis    GERD (gastroesophageal reflux disease)    Urge incontinence    Hyperlipidemia    Chronic back pain    Osteoporosis- last DEXA nl 1/2012    Allergic rhinitis    Edema    Generalized osteoarthritis    Vertigo    Tinnitus    Rotator cuff tear- right    Benign essential tremor    Paroxysmal A-fib (HCC)    Essential hypertension    Pleural effusion - rpt CT scan 9/2022    Obstructive apnea    Asthmatic bronchitis    Prediabetes    History of basal cell carcinoma (BCC)    Chronic coronary artery disease    History of total left hip replacement    Anemia- nl iron, B12, folate 2018    History of knee replacement, total, right    Family history of breast cancer in sister    Chronic obstructive pulmonary disease, unspecified COPD type (HCC)          Current Outpatient Medications   Medication Sig Dispense Refill    amoxicillin-clavulanate (AUGMENTIN) 875-125 MG per tablet Take 1 tablet by mouth 2 times daily for 10 days 20 tablet 0    propranolol (INDERAL LA) 160 MG extended release capsule TAKE 1 CAPSULE BY MOUTH EVERY DAY 90 capsule 1    tiZANidine (ZANAFLEX) 4 MG tablet TAKE 1 TABLET BY MOUTH EVERY EVENING AS NEEDED 90 tablet 1    Biotin 3 MG TABS Take 1 capsule by mouth daily       omeprazole (PRILOSEC) 20 MG delayed release capsule Take 1 capsule by mouth 2 times daily (Patient taking differently: Take 20 mg by mouth Daily ) 60 capsule 11    hydroCHLOROthiazide (HYDRODIURIL) 25 MG tablet Take 25 mg by mouth daily       albuterol sulfate  (90 Base) MCG/ACT inhaler INHALE 2 PUFFS INTO THE LUNGS EVERY 4 HOURS AS NEEDED FOR WHEEZING 6.7 g 3    atorvastatin (LIPITOR) 10 MG tablet TK 1 T PO D      meclizine (ANTIVERT) 12.5 MG tablet TAKE 1/2 TO 1 TABLET BY MOUTH THREE TIMES DAILY AS NEEDED FOR DIZZINESS OR NAUSEA 30 tablet 1    rivaroxaban (XARELTO) 20 MG TABS tablet TAKE 1 TABLET BY MOUTH DAILY WITH BREAKFAST 90 tablet 1     No current facility-administered medications for this visit. Allergies   Allergen Reactions    Codeine Nausea And Vomiting    Myrbetriq [Mirabegron] Nausea And Vomiting    Vicodin [Hydrocodone-Acetaminophen] Nausea And Vomiting       Review of Systems   Constitutional: Positive for activity change and fatigue. Negative for fever. HENT: Positive for congestion, postnasal drip, rhinorrhea and sinus pressure. Respiratory: Positive for cough. Negative for chest tightness and shortness of breath. Cardiovascular: Negative for chest pain, palpitations and leg swelling. Gastrointestinal: Negative for abdominal pain. Neurological: Positive for headaches. Negative for dizziness. Vitals:    06/17/22 1506   BP: 120/80   Site: Right Upper Arm   Position: Sitting   Cuff Size: Large Adult   Pulse: 92   Resp: 18   Temp: 98.4 °F (36.9 °C)   TempSrc: Oral   SpO2: 96%   Height: 5' 3\" (1.6 m)       Body mass index is 37.55 kg/m². Wt Readings from Last 3 Encounters:   02/16/22 212 lb (96.2 kg)   10/21/21 211 lb (95.7 kg)   10/05/21 212 lb 6.6 oz (96.3 kg)       BP Readings from Last 3 Encounters:   06/17/22 120/80   06/06/22 114/66   02/16/22 118/72       Physical Exam  Vitals reviewed. Constitutional:       General: She is not in acute distress. Appearance: Normal appearance. She is obese. HENT:      Head: Normocephalic and atraumatic.       Right Ear: Tympanic membrane normal. Left Ear: Tympanic membrane normal.      Nose: Congestion and rhinorrhea present. Mouth/Throat:      Mouth: Mucous membranes are moist.      Pharynx: Oropharynx is clear. No oropharyngeal exudate or posterior oropharyngeal erythema. Eyes:      General:         Right eye: No discharge. Left eye: No discharge. Cardiovascular:      Rate and Rhythm: Normal rate and regular rhythm. Heart sounds: Normal heart sounds. No murmur heard. No friction rub. No gallop. Pulmonary:      Effort: Pulmonary effort is normal. No respiratory distress. Breath sounds: Normal breath sounds. Musculoskeletal:         General: Normal range of motion. Right lower leg: No edema. Left lower leg: No edema. Lymphadenopathy:      Cervical: No cervical adenopathy. Skin:     General: Skin is warm and dry. Neurological:      Mental Status: She is oriented to person, place, and time. Psychiatric:         Behavior: Behavior normal.         Thought Content: Thought content normal.         Judgment: Judgment normal.         [address] [facility] was seen today for sinus problem. Diagnoses and all orders for this visit:    Acute bacterial sinusitis  Given the extended duration of symptoms, will prescribe augmentin x10 days  Recommended continued supportive care: May take Mucinex (guaifenisen) and Robitussin DM (guafenisen, dextromethorphan) for cough and congestion. May also try Vicks Vaporub. Hydration with water  Rest  -     amoxicillin-clavulanate (AUGMENTIN) 875-125 MG per tablet; Take 1 tablet by mouth 2 times daily for 10 days        Return if symptoms worsen or fail to improve.

## 2022-06-18 ASSESSMENT — ENCOUNTER SYMPTOMS
ABDOMINAL PAIN: 0
COUGH: 1
SHORTNESS OF BREATH: 0
RHINORRHEA: 1
SINUS PRESSURE: 1
CHEST TIGHTNESS: 0

## 2022-07-12 DIAGNOSIS — R42 VERTIGO: ICD-10-CM

## 2022-07-12 RX ORDER — MECLIZINE HCL 12.5 MG/1
TABLET ORAL
Qty: 30 TABLET | Refills: 1 | Status: SHIPPED | OUTPATIENT
Start: 2022-07-12

## 2022-08-17 ENCOUNTER — OFFICE VISIT (OUTPATIENT)
Dept: FAMILY MEDICINE CLINIC | Age: 82
End: 2022-08-17
Payer: MEDICARE

## 2022-08-17 VITALS
DIASTOLIC BLOOD PRESSURE: 70 MMHG | SYSTOLIC BLOOD PRESSURE: 110 MMHG | HEART RATE: 83 BPM | OXYGEN SATURATION: 96 % | BODY MASS INDEX: 37.56 KG/M2 | HEIGHT: 63 IN | WEIGHT: 212 LBS

## 2022-08-17 DIAGNOSIS — E78.2 MIXED HYPERLIPIDEMIA: ICD-10-CM

## 2022-08-17 DIAGNOSIS — I10 ESSENTIAL HYPERTENSION: Primary | Chronic | ICD-10-CM

## 2022-08-17 DIAGNOSIS — I48.0 PAROXYSMAL A-FIB (HCC): ICD-10-CM

## 2022-08-17 DIAGNOSIS — J44.9 CHRONIC OBSTRUCTIVE PULMONARY DISEASE, UNSPECIFIED COPD TYPE (HCC): ICD-10-CM

## 2022-08-17 DIAGNOSIS — R73.03 PREDIABETES: ICD-10-CM

## 2022-08-17 LAB — HBA1C MFR BLD: 5.7 %

## 2022-08-17 PROCEDURE — 83036 HEMOGLOBIN GLYCOSYLATED A1C: CPT | Performed by: FAMILY MEDICINE

## 2022-08-17 PROCEDURE — 1123F ACP DISCUSS/DSCN MKR DOCD: CPT | Performed by: FAMILY MEDICINE

## 2022-08-17 PROCEDURE — 99213 OFFICE O/P EST LOW 20 MIN: CPT | Performed by: FAMILY MEDICINE

## 2022-08-17 SDOH — ECONOMIC STABILITY: FOOD INSECURITY: WITHIN THE PAST 12 MONTHS, YOU WORRIED THAT YOUR FOOD WOULD RUN OUT BEFORE YOU GOT MONEY TO BUY MORE.: NEVER TRUE

## 2022-08-17 SDOH — ECONOMIC STABILITY: FOOD INSECURITY: WITHIN THE PAST 12 MONTHS, THE FOOD YOU BOUGHT JUST DIDN'T LAST AND YOU DIDN'T HAVE MONEY TO GET MORE.: NEVER TRUE

## 2022-08-17 ASSESSMENT — SOCIAL DETERMINANTS OF HEALTH (SDOH): HOW HARD IS IT FOR YOU TO PAY FOR THE VERY BASICS LIKE FOOD, HOUSING, MEDICAL CARE, AND HEATING?: NOT VERY HARD

## 2022-08-17 NOTE — PROGRESS NOTES
CHRONIC CONDITION FOLLOW-UP     Assessment and Plan:      Diagnosis Orders   1. Essential hypertension        2. Mixed hyperlipidemia        3. Paroxysmal A-fib ([facility]        4. Prediabetes  POCT glycosylated hemoglobin (Hb A1C)      5. Chronic obstructive pulmonary disease, unspecified COPD type ([facility]        Stable     Continue current Tx plan. Any changes marked below. INSTRUCTIONS  NEXT APPOINTMENT: Please schedule fasting annual physical (30 minutes) in 6 months. OK to have water, black coffee and medications (except for diabetes medicines)  with Kacy Garcia (Nurse Practitioner). PLEASE TAKE THIS FORM TO CHECK-OUT WINDOW TO SCHEDULE NEXT VISIT. Please get flu vaccine when available in fall. Can get either at this office or at stores such as [employer] and [employer]. In October get COVID booster, new one if available OR the old one. Subjective:      Chief Complaint   Patient presents with    Hypertension     6 mo check BP     Kanika Alcantara is an 80 y.o. female who presents for follow up    Complaints:   none    CHART REVIEW   reports that she quit smoking about 7 years ago. Her smoking use included cigarettes. She has a 5.00 pack-year smoking history.  She has never used smokeless tobacco.  Health Maintenance Due   Topic Date Due    DTaP/Tdap/Td vaccine (2 - Td or Tdap) 04/14/2014    Shingles vaccine (2 of 3) 12/06/2016    COVID-19 Vaccine (4 - Booster for Moderna series) 05/03/2022     Current Outpatient Medications   Medication Instructions    albuterol sulfate  (90 Base) MCG/ACT inhaler INHALE 2 PUFFS INTO THE LUNGS EVERY 4 HOURS AS NEEDED FOR WHEEZING    atorvastatin (LIPITOR) 10 MG tablet TK 1 T PO D    Biotin 3 MG TABS 1 capsule, Oral, DAILY    hydroCHLOROthiazide (HYDRODIURIL) 25 mg, Oral, DAILY    meclizine (ANTIVERT) 12.5 MG tablet TAKE 1/2 TO 1 TABLET BY MOUTH THREE TIMES DAILY AS NEEDED FOR DIZZINESS OR NAUSEA    omeprazole (PRILOSEC) 20 mg, Oral, 2 TIMES DAILY    propranolol (INDERAL LA) 160 MG extended release capsule TAKE 1 CAPSULE BY MOUTH EVERY DAY    rivaroxaban (XARELTO) 20 MG TABS tablet TAKE 1 TABLET BY MOUTH DAILY WITH BREAKFAST    tiZANidine (ZANAFLEX) 4 MG tablet TAKE 1 TABLET BY MOUTH EVERY EVENING AS NEEDED     LAST LABS  Lab Results   Component Value Date    LDLCALC 78 03/01/2022    LDLDIRECT 146 (H) 06/16/2011     Lab Results   Component Value Date    HDL 40 03/01/2022     Lab Results   Component Value Date    TRIG 163 (H) 03/01/2022     Lab Results   Component Value Date     03/01/2022    K 4.5 03/01/2022    CREATININE 0.8 03/01/2022     Lab Results   Component Value Date    WBC 8.3 03/01/2022    HGB 12.8 03/01/2022     03/01/2022     Lab Results   Component Value Date    ALT 16 03/01/2022    AST 20 03/01/2022    ALKPHOS 92 03/01/2022    BILITOT 0.4 03/01/2022     TSH (uIU/mL)   Date Value   09/16/2019 1.13     Lab Results   Component Value Date    GLUCOSE 99 03/01/2022     Lab Results   Component Value Date    LABA1C 5.7 08/17/2022    LABA1C 5.8 03/01/2022    LABA1C 5.8 08/06/2020     Objective:   PHYSICAL EXAM   /70 (Site: Left Upper Arm, Position: Sitting, Cuff Size: Large Adult)   Pulse 83   Ht 5' 3\" (1.6 m)   Wt 212 lb (96.2 kg)   SpO2 96%   BMI 37.55 kg/m²   BP Readings from Last 5 Encounters:   08/17/22 110/70   06/17/22 120/80   06/06/22 114/66   02/16/22 118/72   10/21/21 117/83     Wt Readings from Last 5 Encounters:   08/17/22 212 lb (96.2 kg)   02/16/22 212 lb (96.2 kg)   10/21/21 211 lb (95.7 kg)   10/05/21 212 lb 6.6 oz (96.3 kg)   10/04/21 213 lb (96.6 kg)      GENERAL:   well-developed, well-nourished, alert, no distress.      LUNGS:    Breathing unlabored  clear to auscultation bilaterally and good air movement  CARDIOVASC:   regular rate and rhythm  SKIN: warm and dry

## 2022-08-17 NOTE — PATIENT INSTRUCTIONS
INSTRUCTIONS  NEXT APPOINTMENT: Please schedule fasting annual physical (30 minutes) in 6 months. OK to have water, black coffee and medications (except for diabetes medicines)  with Salbador Armendariz (Nurse Practitioner). PLEASE TAKE THIS FORM TO CHECK-OUT WINDOW TO SCHEDULE NEXT VISIT. Please get flu vaccine when available in fall. Can get either at this office or at stores such as [employer]. In October get COVID booster, new one if available OR the old one.

## 2022-10-13 ENCOUNTER — NURSE ONLY (OUTPATIENT)
Dept: FAMILY MEDICINE CLINIC | Age: 82
End: 2022-10-13
Payer: MEDICARE

## 2022-10-13 DIAGNOSIS — Z23 NEED FOR INFLUENZA VACCINATION: Primary | ICD-10-CM

## 2022-10-13 PROCEDURE — 90694 VACC AIIV4 NO PRSRV 0.5ML IM: CPT | Performed by: FAMILY MEDICINE

## 2022-10-13 PROCEDURE — G0008 ADMIN INFLUENZA VIRUS VAC: HCPCS | Performed by: FAMILY MEDICINE

## 2022-10-13 PROCEDURE — 99999 PR OFFICE/OUTPT VISIT,PROCEDURE ONLY: CPT | Performed by: FAMILY MEDICINE

## 2022-10-14 DIAGNOSIS — G89.29 CHRONIC BILATERAL LOW BACK PAIN WITHOUT SCIATICA: ICD-10-CM

## 2022-10-14 DIAGNOSIS — M54.50 CHRONIC BILATERAL LOW BACK PAIN WITHOUT SCIATICA: ICD-10-CM

## 2022-10-18 RX ORDER — TIZANIDINE 4 MG/1
TABLET ORAL
Qty: 90 TABLET | Refills: 1 | Status: SHIPPED | OUTPATIENT
Start: 2022-10-18

## 2022-11-19 DIAGNOSIS — I10 ESSENTIAL HYPERTENSION: ICD-10-CM

## 2022-11-19 DIAGNOSIS — I48.0 PAROXYSMAL A-FIB (HCC): ICD-10-CM

## 2022-11-21 RX ORDER — PROPRANOLOL HYDROCHLORIDE 160 MG/1
CAPSULE, EXTENDED RELEASE ORAL
Qty: 90 CAPSULE | Refills: 1 | Status: SHIPPED | OUTPATIENT
Start: 2022-11-21

## 2023-02-16 ENCOUNTER — OFFICE VISIT (OUTPATIENT)
Dept: FAMILY MEDICINE CLINIC | Age: 83
End: 2023-02-16

## 2023-02-16 VITALS
SYSTOLIC BLOOD PRESSURE: 112 MMHG | BODY MASS INDEX: 36.86 KG/M2 | OXYGEN SATURATION: 94 % | DIASTOLIC BLOOD PRESSURE: 70 MMHG | HEART RATE: 70 BPM | HEIGHT: 63 IN | WEIGHT: 208 LBS

## 2023-02-16 DIAGNOSIS — I48.0 PAROXYSMAL A-FIB (HCC): ICD-10-CM

## 2023-02-16 DIAGNOSIS — I10 ESSENTIAL HYPERTENSION: Chronic | ICD-10-CM

## 2023-02-16 DIAGNOSIS — E78.2 MIXED HYPERLIPIDEMIA: ICD-10-CM

## 2023-02-16 DIAGNOSIS — Z79.01 CHRONIC ANTICOAGULATION: ICD-10-CM

## 2023-02-16 DIAGNOSIS — Z12.31 OTHER SCREENING MAMMOGRAM: ICD-10-CM

## 2023-02-16 DIAGNOSIS — R73.03 PREDIABETES: ICD-10-CM

## 2023-02-16 DIAGNOSIS — I10 ESSENTIAL HYPERTENSION: Primary | Chronic | ICD-10-CM

## 2023-02-16 DIAGNOSIS — J90 PLEURAL EFFUSION: ICD-10-CM

## 2023-02-16 DIAGNOSIS — Z78.0 POST-MENOPAUSAL: ICD-10-CM

## 2023-02-16 PROBLEM — D64.9 ANEMIA: Status: RESOLVED | Noted: 2018-05-09 | Resolved: 2023-02-16

## 2023-02-16 LAB
A/G RATIO: 1.3 (ref 1.1–2.2)
ALBUMIN SERPL-MCNC: 4 G/DL (ref 3.4–5)
ALP BLD-CCNC: 85 U/L (ref 40–129)
ALT SERPL-CCNC: 15 U/L (ref 10–40)
ANION GAP SERPL CALCULATED.3IONS-SCNC: 16 MMOL/L (ref 3–16)
AST SERPL-CCNC: 19 U/L (ref 15–37)
BASOPHILS ABSOLUTE: 0 K/UL (ref 0–0.2)
BASOPHILS RELATIVE PERCENT: 0 %
BILIRUB SERPL-MCNC: 0.6 MG/DL (ref 0–1)
BUN BLDV-MCNC: 17 MG/DL (ref 7–20)
CALCIUM SERPL-MCNC: 8.9 MG/DL (ref 8.3–10.6)
CHLORIDE BLD-SCNC: 98 MMOL/L (ref 99–110)
CHOLESTEROL, TOTAL: 133 MG/DL (ref 0–199)
CO2: 26 MMOL/L (ref 21–32)
CREAT SERPL-MCNC: 0.8 MG/DL (ref 0.6–1.2)
EOSINOPHILS ABSOLUTE: 0.3 K/UL (ref 0–0.6)
EOSINOPHILS RELATIVE PERCENT: 3 %
GFR SERPL CREATININE-BSD FRML MDRD: >60 ML/MIN/{1.73_M2}
GLUCOSE BLD-MCNC: 103 MG/DL (ref 70–99)
HBA1C MFR BLD: 5.9 %
HCT VFR BLD CALC: 39.8 % (ref 36–48)
HDLC SERPL-MCNC: 41 MG/DL (ref 40–60)
HEMOGLOBIN: 13.1 G/DL (ref 12–16)
LDL CHOLESTEROL CALCULATED: 63 MG/DL
LYMPHOCYTES ABSOLUTE: 2.7 K/UL (ref 1–5.1)
LYMPHOCYTES RELATIVE PERCENT: 27 %
MCH RBC QN AUTO: 28.9 PG (ref 26–34)
MCHC RBC AUTO-ENTMCNC: 32.8 G/DL (ref 31–36)
MCV RBC AUTO: 88.2 FL (ref 80–100)
MONOCYTES ABSOLUTE: 0.9 K/UL (ref 0–1.3)
MONOCYTES RELATIVE PERCENT: 9 %
MYELOCYTE PERCENT: 2 %
NEUTROPHILS ABSOLUTE: 6.2 K/UL (ref 1.7–7.7)
NEUTROPHILS RELATIVE PERCENT: 59 %
OVALOCYTES: ABNORMAL
PDW BLD-RTO: 14.3 % (ref 12.4–15.4)
PLATELET # BLD: 241 K/UL (ref 135–450)
PLATELET SLIDE REVIEW: ADEQUATE
PMV BLD AUTO: 9.5 FL (ref 5–10.5)
POTASSIUM SERPL-SCNC: 4.3 MMOL/L (ref 3.5–5.1)
RBC # BLD: 4.52 M/UL (ref 4–5.2)
SLIDE REVIEW: ABNORMAL
SODIUM BLD-SCNC: 140 MMOL/L (ref 136–145)
TOTAL PROTEIN: 7.2 G/DL (ref 6.4–8.2)
TRIGL SERPL-MCNC: 143 MG/DL (ref 0–150)
VLDLC SERPL CALC-MCNC: 29 MG/DL
WBC # BLD: 10.1 K/UL (ref 4–11)

## 2023-02-16 RX ORDER — ATORVASTATIN CALCIUM 10 MG/1
TABLET, FILM COATED ORAL
Qty: 90 TABLET | Refills: 3 | Status: SHIPPED | OUTPATIENT
Start: 2023-02-16

## 2023-02-16 RX ORDER — HYDROCHLOROTHIAZIDE 25 MG/1
25 TABLET ORAL DAILY
Qty: 90 TABLET | Refills: 1 | Status: SHIPPED | OUTPATIENT
Start: 2023-02-16

## 2023-02-16 RX ORDER — PROPRANOLOL HYDROCHLORIDE 160 MG/1
CAPSULE, EXTENDED RELEASE ORAL
Qty: 90 CAPSULE | Refills: 1 | Status: SHIPPED | OUTPATIENT
Start: 2023-02-16

## 2023-02-16 SDOH — ECONOMIC STABILITY: INCOME INSECURITY: HOW HARD IS IT FOR YOU TO PAY FOR THE VERY BASICS LIKE FOOD, HOUSING, MEDICAL CARE, AND HEATING?: NOT VERY HARD

## 2023-02-16 SDOH — ECONOMIC STABILITY: FOOD INSECURITY: WITHIN THE PAST 12 MONTHS, YOU WORRIED THAT YOUR FOOD WOULD RUN OUT BEFORE YOU GOT MONEY TO BUY MORE.: NEVER TRUE

## 2023-02-16 SDOH — ECONOMIC STABILITY: HOUSING INSECURITY
IN THE LAST 12 MONTHS, WAS THERE A TIME WHEN YOU DID NOT HAVE A STEADY PLACE TO SLEEP OR SLEPT IN A SHELTER (INCLUDING NOW)?: NO

## 2023-02-16 SDOH — ECONOMIC STABILITY: FOOD INSECURITY: WITHIN THE PAST 12 MONTHS, THE FOOD YOU BOUGHT JUST DIDN'T LAST AND YOU DIDN'T HAVE MONEY TO GET MORE.: NEVER TRUE

## 2023-02-16 ASSESSMENT — PATIENT HEALTH QUESTIONNAIRE - PHQ9
SUM OF ALL RESPONSES TO PHQ QUESTIONS 1-9: 0
2. FEELING DOWN, DEPRESSED OR HOPELESS: 0
SUM OF ALL RESPONSES TO PHQ QUESTIONS 1-9: 0
1. LITTLE INTEREST OR PLEASURE IN DOING THINGS: 0
SUM OF ALL RESPONSES TO PHQ9 QUESTIONS 1 & 2: 0
SUM OF ALL RESPONSES TO PHQ QUESTIONS 1-9: 0
SUM OF ALL RESPONSES TO PHQ QUESTIONS 1-9: 0

## 2023-02-16 NOTE — PATIENT INSTRUCTIONS
INSTRUCTIONS  NEXT APPOINTMENT: Please schedule annual complete physical (30 minutes) in 6 months with Dr. Eloisa Zuniga or her NP, Elle Gruber. PLEASE GET BLOODWORK DRAWN TODAY ON FIRST FLOOR in 170. Take orders with you. RESULTS- most blood tests back in couple days. We will call you if any problems. If bloodwork good, you will get letter in mail or notified thru [address] (if signed up) within 2 weeks. If you do not, please call office. Medicare part D patients:  Get Shingrix shingles vaccine at pharmacy (such as [facility] or [facility]). Need second dose in 2-6 months. Medicare starts covering it in 2023. Please get mammogram AFTER 4/13 soon to screen for breast cancer. To schedule at a [address], please call [phone]. Please get DEXA scan for bone density to check on osteoporosis. Would get new bivalent COVID booster soon. Has better coverage for Omicron variant. Separate from other vaccines by at least 2 weeks.

## 2023-02-16 NOTE — PROGRESS NOTES
CHRONIC CONDITION FOLLOW-UP     Assessment and Plan:      Diagnosis Orders   1. Essential hypertension  Comprehensive Metabolic Panel    hydroCHLOROthiazide (HYDRODIURIL) 25 MG tablet    propranolol (INDERAL LA) 160 MG extended release capsule      2. Post-menopausal  DEXA BONE DENSITY AXIAL SKELETON      3. Other screening mammogram  [facility] DIGITAL SCREEN BILATERAL      4. Mixed hyperlipidemia  Comprehensive Metabolic Panel    Lipid Panel    atorvastatin (LIPITOR) 10 MG tablet      5. Pleural effusion - rpt CT scan 9/2022        6. Prediabetes  POCT glycosylated hemoglobin (Hb A1C)      7. Paroxysmal A-fib (HCC)  rivaroxaban (XARELTO) 20 MG TABS tablet    propranolol (INDERAL LA) 160 MG extended release capsule    CBC with Auto Differential      8. Chronic anticoagulation  CBC with Auto Differential      Stable     Continue current Tx plan. Any changes marked below. INSTRUCTIONS  NEXT APPOINTMENT: Please schedule annual complete physical (30 minutes) in 6 months with Dr. Jc Martinez or her NP, Michela Armstrong. PLEASE GET BLOODWORK DRAWN TODAY ON FIRST FLOOR in 170. Take orders with you. RESULTS- most blood tests back in couple days. We will call you if any problems. If bloodwork good, you will get letter in mail or notified thru [address] (if signed up) within 2 weeks. If you do not, please call office. Medicare part D patients:  Get Shingrix shingles vaccine at pharmacy (such as [facility] or [facility]). Need second dose in 2-6 months. Medicare starts covering it in 2023. Please get mammogram AFTER 4/13 soon to screen for breast cancer. To schedule at a [address], please call [phone]. Please get DEXA scan for bone density to check on osteoporosis. Would get new bivalent COVID booster soon. Has better coverage for Omicron variant. Separate from other vaccines by at least 2 weeks.       Subjective:      Chief Complaint   Patient presents with    Hypertension     6 mo f/u BP      Blayne Murray is an 80 y.o. female who presents for follow up    Complaints:   none    CHART REVIEW   reports that she quit smoking about 8 years ago. Her smoking use included cigarettes. She has a 5.00 pack-year smoking history.  She has never used smokeless tobacco.  Health Maintenance Due   Topic Date Due    DTaP/Tdap/Td vaccine (2 - Td or Tdap) 04/14/2014    Shingles vaccine (2 of 3) 12/06/2016    COVID-19 Vaccine (4 - Booster for Moderna series) 02/28/2022    Depression Screen  02/16/2023    Annual Wellness Visit (AWV)  02/17/2023    Lipids  03/01/2023     Current Outpatient Medications   Medication Instructions    albuterol sulfate  (90 Base) MCG/ACT inhaler INHALE 2 PUFFS INTO THE LUNGS EVERY 4 HOURS AS NEEDED FOR WHEEZING    atorvastatin (LIPITOR) 10 MG tablet TK 1 T PO D    Biotin 3 MG TABS 1 capsule, Oral, DAILY    hydroCHLOROthiazide (HYDRODIURIL) 25 mg, Oral, DAILY    meclizine (ANTIVERT) 12.5 MG tablet TAKE 1/2 TO 1 TABLET BY MOUTH THREE TIMES DAILY AS NEEDED FOR DIZZINESS OR NAUSEA    omeprazole (PRILOSEC) 20 mg, Oral, 2 TIMES DAILY    propranolol (INDERAL LA) 160 MG extended release capsule TAKE 1 CAPSULE BY MOUTH EVERY DAY    rivaroxaban (XARELTO) 20 MG TABS tablet TAKE 1 TABLET BY MOUTH DAILY WITH BREAKFAST    tiZANidine (ZANAFLEX) 4 MG tablet TAKE 1 TABLET BY MOUTH EVERY EVENING AS NEEDED     LAST LABS  Lab Results   Component Value Date    LDLCALC 78 03/01/2022    LDLDIRECT 146 (H) 06/16/2011     Lab Results   Component Value Date    HDL 40 03/01/2022     Lab Results   Component Value Date    TRIG 163 (H) 03/01/2022     Lab Results   Component Value Date    ALT 16 03/01/2022    AST 20 03/01/2022    ALKPHOS 92 03/01/2022    BILITOT 0.4 03/01/2022     Lab Results   Component Value Date     03/01/2022    K 4.5 03/01/2022    CREATININE 0.8 03/01/2022     Lab Results   Component Value Date    LABGLOM >60 03/01/2022    LABGLOM >60 09/16/2021    LABGLOM >60 11/16/2020     Lab Results   Component Value Date WBC 8.3 03/01/2022    HGB 12.8 03/01/2022     03/01/2022     TSH (uIU/mL)   Date Value   09/16/2019 1.13     Lab Results   Component Value Date    GLUCOSE 99 03/01/2022     Lab Results   Component Value Date    LABA1C 5.7 08/17/2022    LABA1C 5.8 03/01/2022    LABA1C 5.8 08/06/2020     Objective:   PHYSICAL EXAM   /70 (Site: Left Upper Arm, Position: Sitting, Cuff Size: Large Adult)   Pulse 70   Ht 5' 3\" (1.6 m)   Wt 208 lb (94.3 kg)   SpO2 94%   BMI 36.85 kg/m²   BP Readings from Last 5 Encounters:   02/16/23 112/70   08/17/22 110/70   06/17/22 120/80   06/06/22 114/66   02/16/22 118/72     Wt Readings from Last 5 Encounters:   02/16/23 208 lb (94.3 kg)   08/17/22 212 lb (96.2 kg)   02/16/22 212 lb (96.2 kg)   10/21/21 211 lb (95.7 kg)   10/05/21 212 lb 6.6 oz (96.3 kg)      GENERAL:   well-developed, well-nourished, alert, no distress.      LUNGS:    Breathing unlabored  clear to auscultation bilaterally and good air movement  CARDIOVASC:   regular rate and rhythm  SKIN: warm and dry

## 2023-04-17 ENCOUNTER — HOSPITAL ENCOUNTER (OUTPATIENT)
Dept: WOMENS IMAGING | Age: 83
Discharge: HOME | End: 2023-04-17
Payer: MEDICARE

## 2023-04-17 DIAGNOSIS — R92.8 ABNORMAL MAMMOGRAM OF RIGHT BREAST: Primary | ICD-10-CM

## 2023-04-17 DIAGNOSIS — Z78.0 POST-MENOPAUSAL: ICD-10-CM

## 2023-04-17 DIAGNOSIS — Z12.31 OTHER SCREENING MAMMOGRAM: ICD-10-CM

## 2023-04-17 PROCEDURE — 77063 BREAST TOMOSYNTHESIS BI: CPT

## 2023-04-17 PROCEDURE — 77080 DXA BONE DENSITY AXIAL: CPT

## 2023-05-10 ENCOUNTER — HOSPITAL ENCOUNTER (OUTPATIENT)
Dept: WOMENS IMAGING | Age: 83
Discharge: HOME | End: 2023-05-10
Payer: MEDICARE

## 2023-05-10 ENCOUNTER — HOSPITAL ENCOUNTER (OUTPATIENT)
Dept: ULTRASOUND IMAGING | Age: 83
Discharge: HOME | End: 2023-05-10
Payer: MEDICARE

## 2023-05-10 DIAGNOSIS — R92.8 ABNORMAL MAMMOGRAM: ICD-10-CM

## 2023-05-10 DIAGNOSIS — R92.8 ABNORMAL MAMMOGRAM OF RIGHT BREAST: ICD-10-CM

## 2023-05-10 PROCEDURE — 76642 ULTRASOUND BREAST LIMITED: CPT

## 2023-05-10 PROCEDURE — G0279 TOMOSYNTHESIS, MAMMO: HCPCS

## 2023-08-14 SDOH — HEALTH STABILITY: PHYSICAL HEALTH: ON AVERAGE, HOW MANY MINUTES DO YOU ENGAGE IN EXERCISE AT THIS LEVEL?: 30 MIN

## 2023-08-14 ASSESSMENT — PATIENT HEALTH QUESTIONNAIRE - PHQ9
SUM OF ALL RESPONSES TO PHQ9 QUESTIONS 1 & 2: 0
SUM OF ALL RESPONSES TO PHQ QUESTIONS 1-9: 0
1. LITTLE INTEREST OR PLEASURE IN DOING THINGS: 0
SUM OF ALL RESPONSES TO PHQ QUESTIONS 1-9: 0
2. FEELING DOWN, DEPRESSED OR HOPELESS: 0

## 2023-08-14 ASSESSMENT — LIFESTYLE VARIABLES
HOW OFTEN DO YOU HAVE A DRINK CONTAINING ALCOHOL: 2
HOW OFTEN DO YOU HAVE A DRINK CONTAINING ALCOHOL: MONTHLY OR LESS
HOW MANY STANDARD DRINKS CONTAINING ALCOHOL DO YOU HAVE ON A TYPICAL DAY: 1
HOW MANY STANDARD DRINKS CONTAINING ALCOHOL DO YOU HAVE ON A TYPICAL DAY: 1 OR 2
HOW OFTEN DO YOU HAVE SIX OR MORE DRINKS ON ONE OCCASION: 1

## 2023-08-17 ENCOUNTER — OFFICE VISIT (OUTPATIENT)
Dept: FAMILY MEDICINE CLINIC | Age: 83
End: 2023-08-17
Payer: MEDICARE

## 2023-08-17 VITALS
WEIGHT: 218 LBS | HEIGHT: 63 IN | OXYGEN SATURATION: 95 % | HEART RATE: 73 BPM | SYSTOLIC BLOOD PRESSURE: 116 MMHG | DIASTOLIC BLOOD PRESSURE: 74 MMHG | BODY MASS INDEX: 38.62 KG/M2

## 2023-08-17 DIAGNOSIS — I25.10 CHRONIC CORONARY ARTERY DISEASE: ICD-10-CM

## 2023-08-17 DIAGNOSIS — I10 ESSENTIAL HYPERTENSION: Chronic | ICD-10-CM

## 2023-08-17 DIAGNOSIS — E78.2 MIXED HYPERLIPIDEMIA: ICD-10-CM

## 2023-08-17 DIAGNOSIS — Z79.01 CHRONIC ANTICOAGULATION: ICD-10-CM

## 2023-08-17 DIAGNOSIS — Z00.00 MEDICARE ANNUAL WELLNESS VISIT, SUBSEQUENT: Primary | ICD-10-CM

## 2023-08-17 DIAGNOSIS — R73.03 PREDIABETES: ICD-10-CM

## 2023-08-17 DIAGNOSIS — I48.0 PAROXYSMAL A-FIB (HCC): ICD-10-CM

## 2023-08-17 DIAGNOSIS — Z85.828 HISTORY OF BASAL CELL CARCINOMA (BCC): ICD-10-CM

## 2023-08-17 LAB — HBA1C MFR BLD: 6.2 %

## 2023-08-17 PROCEDURE — 3078F DIAST BP <80 MM HG: CPT | Performed by: FAMILY MEDICINE

## 2023-08-17 PROCEDURE — G0439 PPPS, SUBSEQ VISIT: HCPCS | Performed by: FAMILY MEDICINE

## 2023-08-17 PROCEDURE — 3074F SYST BP LT 130 MM HG: CPT | Performed by: FAMILY MEDICINE

## 2023-08-17 PROCEDURE — 83037 HB GLYCOSYLATED A1C HOME DEV: CPT | Performed by: FAMILY MEDICINE

## 2023-08-17 PROCEDURE — 1123F ACP DISCUSS/DSCN MKR DOCD: CPT | Performed by: FAMILY MEDICINE

## 2023-08-17 NOTE — PATIENT INSTRUCTIONS
INSTRUCTIONS  NEXT APPOINTMENT: Please schedule check-up in 6 months with Dr. Royce Aparicio or her NP, Kalia Salazar. PLEASE TAKE THIS FORM TO CHECK-OUT WINDOW TO SCHEDULE NEXT VISIT. Please get flu vaccine when available in fall. Can get either at this office or at stores such as [employer] and [employer] . Due for tetanus booster which prevents lockjaw from injuries. Medicare only covers for injury, so call to be seen for tetanus booster if you have any cuts, punctures or other open skin injury. Medicare part D patients:  Get Shingrix shingles vaccine at pharmacy (such as [facility] or [facility]). Need second dose in 2-6 months. Medicare starts covering it in 2023. Would get new COVID booster in fall. Separate from other vaccines by at least 2 weeks. Look into making living will and medical POA. Info included in back of this packet. We would like a notarized copy for our records. Keep annual derm exam  Find place to walk indoors for winter. Try 4-5 days a week. Patient Education           Hearing Loss: Care Instructions  Overview     Hearing loss is a sudden or slow decrease in how well you hear. It can range from slight to profound. Permanent hearing loss can occur with aging. It also can happen when you are exposed long-term to loud noise. Examples include listening to loud music, riding motorcycles, or being around other loud machines. Hearing loss can affect your work and home life. It can make you feel lonely or depressed. You may feel that you have lost your independence. But hearing aids and other devices can help you hear better and feel connected to others. Follow-up care is a key part of your treatment and safety. Be sure to make and go to all appointments, and call your doctor if you are having problems. It's also a good idea to know your test results and keep a list of the medicines you take. How can you care for yourself at home? Avoid loud noises whenever possible.  This helps keep your hearing from

## 2023-08-17 NOTE — PROGRESS NOTES
external ear canals both ears  External nose and ears appear normal  Pharynx: normal. Exudates: None  Lips, mucosa, and tongue normal  Hearing grossly normal.    NECK: No adenopathy, supple, symmetrical, trachea midline  Thyroid not enlarged, symmetric, no tenderness/mass/nodules  no cervical nodes, no supraclavicular nodes  LUNGS:  Breathing unlabored  clear to auscultation bilaterally and good air movement  CARDIOVASC: regular rate and rhythm, S1, S2 normal  LEGS:  Lower extremity edema: none    No carotid bruits  ABDOMEN: Soft, non-tender, no masses  No hepatosplenomegaly  No hernias noted. Exam limited by N/A  SKIN: warm and dry  No rashes or suspicious lesions  PSYCH:  Alert and oriented  Normal reasoning, insight good  Facial expressions full, mood appropriate  No memory disturbance noted  MUSCULOSKEL:  No significant finger or nail findings  Spine symmetric, no deformities, no kyphosis   GAIT: UP and Go test: <30 seconds with gait: stiff. Speed Normal.  No significant balance checks. No extra steps on turn around.  Assistive device: none

## 2023-10-30 DIAGNOSIS — I48.0 PAROXYSMAL A-FIB (HCC): ICD-10-CM

## 2023-12-03 DIAGNOSIS — I10 ESSENTIAL HYPERTENSION: Chronic | ICD-10-CM

## 2023-12-03 DIAGNOSIS — I48.0 PAROXYSMAL A-FIB (HCC): ICD-10-CM

## 2023-12-04 RX ORDER — PROPRANOLOL HYDROCHLORIDE 160 MG/1
CAPSULE, EXTENDED RELEASE ORAL
Qty: 90 CAPSULE | Refills: 1 | Status: SHIPPED | OUTPATIENT
Start: 2023-12-04

## 2024-02-21 ENCOUNTER — OFFICE VISIT (OUTPATIENT)
Dept: FAMILY MEDICINE CLINIC | Age: 84
End: 2024-02-21
Payer: MEDICARE

## 2024-02-21 VITALS
DIASTOLIC BLOOD PRESSURE: 70 MMHG | HEIGHT: 63 IN | BODY MASS INDEX: 38.62 KG/M2 | HEART RATE: 81 BPM | SYSTOLIC BLOOD PRESSURE: 118 MMHG | OXYGEN SATURATION: 95 % | WEIGHT: 218 LBS

## 2024-02-21 DIAGNOSIS — R19.7 POSTCHOLECYSTECTOMY DIARRHEA: ICD-10-CM

## 2024-02-21 DIAGNOSIS — K52.9 CHRONIC DIARRHEA: ICD-10-CM

## 2024-02-21 DIAGNOSIS — I48.0 PAROXYSMAL A-FIB (HCC): ICD-10-CM

## 2024-02-21 DIAGNOSIS — R73.03 PREDIABETES: ICD-10-CM

## 2024-02-21 DIAGNOSIS — Z79.01 CHRONIC ANTICOAGULATION: ICD-10-CM

## 2024-02-21 DIAGNOSIS — K91.89 POSTCHOLECYSTECTOMY DIARRHEA: ICD-10-CM

## 2024-02-21 DIAGNOSIS — R15.9 INCONTINENCE OF FECES, UNSPECIFIED FECAL INCONTINENCE TYPE: ICD-10-CM

## 2024-02-21 DIAGNOSIS — I10 ESSENTIAL HYPERTENSION: Primary | Chronic | ICD-10-CM

## 2024-02-21 DIAGNOSIS — E78.2 MIXED HYPERLIPIDEMIA: ICD-10-CM

## 2024-02-21 LAB — HBA1C MFR BLD: 6.3 %

## 2024-02-21 PROCEDURE — 1123F ACP DISCUSS/DSCN MKR DOCD: CPT | Performed by: FAMILY MEDICINE

## 2024-02-21 PROCEDURE — 83036 HEMOGLOBIN GLYCOSYLATED A1C: CPT | Performed by: FAMILY MEDICINE

## 2024-02-21 PROCEDURE — 3074F SYST BP LT 130 MM HG: CPT | Performed by: FAMILY MEDICINE

## 2024-02-21 PROCEDURE — 99213 OFFICE O/P EST LOW 20 MIN: CPT | Performed by: FAMILY MEDICINE

## 2024-02-21 PROCEDURE — 3078F DIAST BP <80 MM HG: CPT | Performed by: FAMILY MEDICINE

## 2024-02-21 RX ORDER — PROPRANOLOL HYDROCHLORIDE 160 MG/1
160 CAPSULE, EXTENDED RELEASE ORAL DAILY
Qty: 90 CAPSULE | Refills: 1 | Status: SHIPPED | OUTPATIENT
Start: 2024-02-21

## 2024-02-21 RX ORDER — ATORVASTATIN CALCIUM 10 MG/1
TABLET, FILM COATED ORAL
Qty: 90 TABLET | Refills: 3 | Status: SHIPPED | OUTPATIENT
Start: 2024-02-21

## 2024-02-21 RX ORDER — HYDROCHLOROTHIAZIDE 25 MG/1
25 TABLET ORAL DAILY
Qty: 90 TABLET | Refills: 1 | Status: SHIPPED | OUTPATIENT
Start: 2024-02-21

## 2024-02-21 RX ORDER — CHOLESTYRAMINE 4 G/9G
POWDER, FOR SUSPENSION ORAL
Qty: 180 EACH | OUTPATIENT
Start: 2024-02-21

## 2024-02-21 RX ORDER — CHOLESTYRAMINE 4 G/9G
1 POWDER, FOR SUSPENSION ORAL 2 TIMES DAILY
Qty: 60 PACKET | Refills: 5 | Status: SHIPPED | OUTPATIENT
Start: 2024-02-21

## 2024-02-21 SDOH — ECONOMIC STABILITY: FOOD INSECURITY: WITHIN THE PAST 12 MONTHS, YOU WORRIED THAT YOUR FOOD WOULD RUN OUT BEFORE YOU GOT MONEY TO BUY MORE.: NEVER TRUE

## 2024-02-21 SDOH — ECONOMIC STABILITY: FOOD INSECURITY: WITHIN THE PAST 12 MONTHS, THE FOOD YOU BOUGHT JUST DIDN'T LAST AND YOU DIDN'T HAVE MONEY TO GET MORE.: NEVER TRUE

## 2024-02-21 SDOH — ECONOMIC STABILITY: INCOME INSECURITY: HOW HARD IS IT FOR YOU TO PAY FOR THE VERY BASICS LIKE FOOD, HOUSING, MEDICAL CARE, AND HEATING?: NOT HARD AT ALL

## 2024-02-21 ASSESSMENT — PATIENT HEALTH QUESTIONNAIRE - PHQ9
2. FEELING DOWN, DEPRESSED OR HOPELESS: 0
SUM OF ALL RESPONSES TO PHQ QUESTIONS 1-9: 0
1. LITTLE INTEREST OR PLEASURE IN DOING THINGS: 0
SUM OF ALL RESPONSES TO PHQ9 QUESTIONS 1 & 2: 0

## 2024-02-21 NOTE — PROGRESS NOTES
CHRONIC CONDITION FOLLOW-UP     Assessment and Plan:      Diagnosis Orders   1. Essential hypertension  propranolol (INDERAL LA) 160 MG extended release capsule    hydroCHLOROthiazide (HYDRODIURIL) 25 MG tablet    Comprehensive Metabolic Panel      2. Paroxysmal A-fib (HCC)  propranolol (INDERAL LA) 160 MG extended release capsule    CBC with Auto Differential      3. Mixed hyperlipidemia  atorvastatin (LIPITOR) 10 MG tablet    Comprehensive Metabolic Panel    Lipid Panel      4. Postcholecystectomy diarrhea  cholestyramine (QUESTRAN) 4 g packet      5. Prediabetes  POCT glycosylated hemoglobin (Hb A1C)      6. Chronic diarrhea        7. Incontinence of feces, unspecified fecal incontinence type        8. Chronic anticoagulation  CBC with Auto Differential      Stable     Continue current Tx plan. Any changes marked below.    INSTRUCTIONS  NEXT APPOINTMENT: Please schedule annual complete physical (30 minutes) in 6 months   PLEASE TAKE THIS FORM TO CHECK-OUT WINDOW TO SCHEDULE NEXT VISIT.   PLEASE GET FASTING BLOODWORK DRAWN SOON.  Lab is on first floor in [address]. Hours Monday to Friday 6:30 AM to 4 PM AND Saturday 8-12.    Get annual COVID vaccine once a year. Can get at the same time as flu shot OR separate from other vaccines by at least 2 weeks.   START cholesyramine for diarrhea.  If not helping in a few weeks, will refer to GI.  [employer] online for glasses.  RSV (Respiratory syncytial virus) causes colds but can also cause viral pneumonia. Those at risk of more severe infection are the very young and those over 60 AND have chronic medical conditions such as heart, lung, kidney, liver, or neuromuscular disease; diabetes, immunocompromise, frail, or live in nursing home. It is a single dose that provides coverage for at least 2 years. Side effects are pain at injection site, fatigue, aches and headache. May be severe enough to interrupt daily activities in 1-3% for a day or 2. Uncertain about Medicare

## 2024-02-26 DIAGNOSIS — Z79.01 CHRONIC ANTICOAGULATION: ICD-10-CM

## 2024-02-26 DIAGNOSIS — I10 ESSENTIAL HYPERTENSION: Chronic | ICD-10-CM

## 2024-02-26 DIAGNOSIS — E78.2 MIXED HYPERLIPIDEMIA: ICD-10-CM

## 2024-02-26 DIAGNOSIS — I48.0 PAROXYSMAL A-FIB (HCC): ICD-10-CM

## 2024-02-26 LAB
ALBUMIN SERPL-MCNC: 4.2 G/DL (ref 3.4–5)
ALBUMIN/GLOB SERPL: 1.6 {RATIO} (ref 1.1–2.2)
ALP SERPL-CCNC: 83 U/L (ref 40–129)
ALT SERPL-CCNC: 16 U/L (ref 10–40)
ANION GAP SERPL CALCULATED.3IONS-SCNC: 15 MMOL/L (ref 3–16)
AST SERPL-CCNC: 23 U/L (ref 15–37)
BASOPHILS # BLD: 0 K/UL (ref 0–0.2)
BASOPHILS NFR BLD: 0.2 %
BILIRUB SERPL-MCNC: 0.5 MG/DL (ref 0–1)
BUN SERPL-MCNC: 13 MG/DL (ref 7–20)
CALCIUM SERPL-MCNC: 9.2 MG/DL (ref 8.3–10.6)
CHLORIDE SERPL-SCNC: 104 MMOL/L (ref 99–110)
CHOLEST SERPL-MCNC: 101 MG/DL (ref 0–199)
CO2 SERPL-SCNC: 24 MMOL/L (ref 21–32)
CREAT SERPL-MCNC: 0.8 MG/DL (ref 0.6–1.2)
DEPRECATED RDW RBC AUTO: 14.5 % (ref 12.4–15.4)
EOSINOPHIL # BLD: 0.3 K/UL (ref 0–0.6)
EOSINOPHIL NFR BLD: 2.6 %
GFR SERPLBLD CREATININE-BSD FMLA CKD-EPI: >60 ML/MIN/{1.73_M2}
GLUCOSE SERPL-MCNC: 105 MG/DL (ref 70–99)
HCT VFR BLD AUTO: 38.6 % (ref 36–48)
HDLC SERPL-MCNC: 42 MG/DL (ref 40–60)
HGB BLD-MCNC: 12.5 G/DL (ref 12–16)
LDLC SERPL CALC-MCNC: 37 MG/DL
LYMPHOCYTES # BLD: 2.6 K/UL (ref 1–5.1)
LYMPHOCYTES NFR BLD: 25.2 %
MCH RBC QN AUTO: 28.3 PG (ref 26–34)
MCHC RBC AUTO-ENTMCNC: 32.3 G/DL (ref 31–36)
MCV RBC AUTO: 87.6 FL (ref 80–100)
MONOCYTES # BLD: 0.6 K/UL (ref 0–1.3)
MONOCYTES NFR BLD: 5.9 %
NEUTROPHILS # BLD: 6.7 K/UL (ref 1.7–7.7)
NEUTROPHILS NFR BLD: 66.1 %
PLATELET # BLD AUTO: 262 K/UL (ref 135–450)
PMV BLD AUTO: 9.4 FL (ref 5–10.5)
POTASSIUM SERPL-SCNC: 4 MMOL/L (ref 3.5–5.1)
PROT SERPL-MCNC: 6.8 G/DL (ref 6.4–8.2)
RBC # BLD AUTO: 4.41 M/UL (ref 4–5.2)
SODIUM SERPL-SCNC: 143 MMOL/L (ref 136–145)
TRIGL SERPL-MCNC: 112 MG/DL (ref 0–150)
VLDLC SERPL CALC-MCNC: 22 MG/DL
WBC # BLD AUTO: 10.1 K/UL (ref 4–11)

## 2024-03-13 ENCOUNTER — OFFICE VISIT (OUTPATIENT)
Dept: FAMILY MEDICINE CLINIC | Age: 84
End: 2024-03-13
Payer: MEDICARE

## 2024-03-13 VITALS
HEIGHT: 63 IN | OXYGEN SATURATION: 95 % | BODY MASS INDEX: 38.38 KG/M2 | WEIGHT: 216.6 LBS | RESPIRATION RATE: 18 BRPM | SYSTOLIC BLOOD PRESSURE: 128 MMHG | DIASTOLIC BLOOD PRESSURE: 79 MMHG | HEART RATE: 96 BPM

## 2024-03-13 DIAGNOSIS — K64.5 THROMBOSED EXTERNAL HEMORRHOID: Primary | ICD-10-CM

## 2024-03-13 PROCEDURE — 3074F SYST BP LT 130 MM HG: CPT | Performed by: FAMILY MEDICINE

## 2024-03-13 PROCEDURE — 1123F ACP DISCUSS/DSCN MKR DOCD: CPT | Performed by: FAMILY MEDICINE

## 2024-03-13 PROCEDURE — 3078F DIAST BP <80 MM HG: CPT | Performed by: FAMILY MEDICINE

## 2024-03-13 PROCEDURE — 99213 OFFICE O/P EST LOW 20 MIN: CPT | Performed by: FAMILY MEDICINE

## 2024-03-13 NOTE — PATIENT INSTRUCTIONS
See handouts  Sitz bath as needed.  OTC hemorrhoid creams as needed.  HOLD cholestyramine powder for a week while healing so stools are loose.  Patient Education      THROMBOSED HEMORRHOID    What Is It?    The anus is that part of the intestinal tract that passes through the muscular canal of the pelvis and anal sphincters. It is the final orifice through which stool passes out of the body. In adults, the anus is 4 to 5 centimeters long. The lower half of the anal canal has sensitive nerve endings. There are blood vessels under the lining, and in its mid portion there are numerous tiny, anal glands.     Hemorrhoids -- Hemorrhoids do not ordinarily cause pain. Nevertheless, sometime the blood vessels in a small hemorrhoid at the edge of the anal orifice can clot off (\"thrombosis\"). This may be triggered by a period of constipation of diarrhea. When thrombosis occurs, the external hemorrhoid becomes swollen, hard, and painful, sometimes with bloody discharge.     Symptoms  For thrombosis of an external hemorrhoid, the signs and symptoms include:  A firm and usually quite painful swelling at the anal orifice   Occasionally bloody discharge, if the surface of the hemorrhoid breaks down.     Expected Duration  Usually the body will slowly reabsorb the clot in such a hemorrhoid, and the pain and swelling will slowly go away over a period of days to a couple of weeks.    Treatment  Usually this will slowly disappear on its own. The process can be hastened by taking a fiber supplement to soften the stool, as well as by taking frequent warm water soaks (\"sitz baths\"). If the hemorrhoid is unusually painful, the doctor may carry out a limited operation under local anesthesia to remove the clotted hemorrhoid.            HEMORRHOIDS    Definition      Hemorrhoids ([id number]), also called piles, are swollen and inflamed veins in your anus and lower rectum. Hemorrhoids may result from straining during bowel ovements or from 
eye vision change

## 2024-03-13 NOTE — PROGRESS NOTES
PROBLEM VISIT NOTE     Subjective:     Chief Complaint   Patient presents with    Hemorrhoids     Pt believes she has hemorrhoids, pt feels like the hemorrhoid busted last night because she's been experiencing some blood.      Erinn Solitario is a 83 y.o. female who presents pain rectal area x 1 week. Felt round bump. Used prep H. Throbbed and painful x 3 d. Tried Sitz baths.  Last night pain suddenly went away but pad was full of blood.    Fenofibrate helped diarrhea a lot.    Objective:   PHYSICAL EXAM   /79 (Site: Right Upper Arm, Position: Sitting, Cuff Size: Large Adult)   Pulse 96   Resp 18   Ht 1.6 m (5' 3\")   Wt 98.2 kg (216 lb 9.6 oz)   SpO2 95%   BMI 38.37 kg/m²   BP Readings from Last 5 Encounters:   03/13/24 128/79   02/21/24 118/70   08/17/23 116/74   02/16/23 112/70   08/17/22 110/70     Wt Readings from Last 5 Encounters:   03/13/24 98.2 kg (216 lb 9.6 oz)   02/21/24 98.9 kg (218 lb)   08/17/23 98.9 kg (218 lb)   02/16/23 94.3 kg (208 lb)   08/17/22 96.2 kg (212 lb)      GENERAL:   well-developed, well-nourished, alert, no distress.     SKIN: warm and dry  RECTAL-R ext hemorrhoid, no bleeding     Assessment and Plan:      Diagnosis Orders   1. Thrombosed external hemorrhoid           See handouts  Sitz bath as needed.  OTC hemorrhoid creams as needed.  HOLD cholestyramine powder for a week while healing so stools are loose.

## 2024-03-20 ENCOUNTER — TELEPHONE (OUTPATIENT)
Dept: FAMILY MEDICINE CLINIC | Age: 84
End: 2024-03-20

## 2024-03-20 DIAGNOSIS — K64.5 THROMBOSED EXTERNAL HEMORRHOID: Primary | ICD-10-CM

## 2024-03-20 NOTE — TELEPHONE ENCOUNTER
----- Message from Erinn Solitario sent at 3/20/2024 11:43 AM EDT -----  Regarding: referral  Contact: [phone]  Dr. Rowell, I need a referral from you to see  Dr. Yue Barbour MD  [address] [place] [address], [address]  [place] [id number] [phone] phone  [phone] fax  for my hemmoroid. it is still sore and bleeding.  Can you fax the referral to her?  Thank you so much,  Zara Solitario

## 2024-04-30 DIAGNOSIS — I48.0 PAROXYSMAL A-FIB (HCC): ICD-10-CM

## 2024-08-15 DIAGNOSIS — I48.0 PAROXYSMAL A-FIB (HCC): ICD-10-CM

## 2024-08-15 DIAGNOSIS — I10 ESSENTIAL HYPERTENSION: Chronic | ICD-10-CM

## 2024-08-15 RX ORDER — PROPRANOLOL HYDROCHLORIDE 160 MG/1
160 CAPSULE, EXTENDED RELEASE ORAL DAILY
Qty: 90 CAPSULE | Refills: 0 | Status: SHIPPED | OUTPATIENT
Start: 2024-08-15

## 2024-08-21 ENCOUNTER — OFFICE VISIT (OUTPATIENT)
Dept: FAMILY MEDICINE CLINIC | Age: 84
End: 2024-08-21
Payer: MEDICARE

## 2024-08-21 VITALS
OXYGEN SATURATION: 94 % | BODY MASS INDEX: 38.62 KG/M2 | SYSTOLIC BLOOD PRESSURE: 132 MMHG | HEIGHT: 63 IN | RESPIRATION RATE: 12 BRPM | HEART RATE: 80 BPM | WEIGHT: 218 LBS | DIASTOLIC BLOOD PRESSURE: 84 MMHG

## 2024-08-21 DIAGNOSIS — R73.03 PREDIABETES: ICD-10-CM

## 2024-08-21 DIAGNOSIS — Z12.31 OTHER SCREENING MAMMOGRAM: ICD-10-CM

## 2024-08-21 DIAGNOSIS — Z79.01 CHRONIC ANTICOAGULATION: ICD-10-CM

## 2024-08-21 DIAGNOSIS — I10 ESSENTIAL HYPERTENSION: ICD-10-CM

## 2024-08-21 DIAGNOSIS — K52.9 CHRONIC DIARRHEA: ICD-10-CM

## 2024-08-21 DIAGNOSIS — I48.0 PAROXYSMAL A-FIB (HCC): ICD-10-CM

## 2024-08-21 DIAGNOSIS — I25.10 CHRONIC CORONARY ARTERY DISEASE: ICD-10-CM

## 2024-08-21 DIAGNOSIS — R19.7 POSTCHOLECYSTECTOMY DIARRHEA: ICD-10-CM

## 2024-08-21 DIAGNOSIS — R15.9 INCONTINENCE OF FECES, UNSPECIFIED FECAL INCONTINENCE TYPE: ICD-10-CM

## 2024-08-21 DIAGNOSIS — J44.9 CHRONIC OBSTRUCTIVE PULMONARY DISEASE, UNSPECIFIED COPD TYPE (HCC): ICD-10-CM

## 2024-08-21 DIAGNOSIS — Z00.00 MEDICARE ANNUAL WELLNESS VISIT, SUBSEQUENT: Primary | ICD-10-CM

## 2024-08-21 DIAGNOSIS — E66.01 SEVERE OBESITY (BMI 35.0-39.9) WITH COMORBIDITY (HCC): ICD-10-CM

## 2024-08-21 DIAGNOSIS — K91.89 POSTCHOLECYSTECTOMY DIARRHEA: ICD-10-CM

## 2024-08-21 DIAGNOSIS — E78.2 MIXED HYPERLIPIDEMIA: ICD-10-CM

## 2024-08-21 LAB — HBA1C MFR BLD: 5.9 %

## 2024-08-21 PROCEDURE — 83036 HEMOGLOBIN GLYCOSYLATED A1C: CPT | Performed by: FAMILY MEDICINE

## 2024-08-21 PROCEDURE — 3079F DIAST BP 80-89 MM HG: CPT | Performed by: FAMILY MEDICINE

## 2024-08-21 PROCEDURE — 3075F SYST BP GE 130 - 139MM HG: CPT | Performed by: FAMILY MEDICINE

## 2024-08-21 PROCEDURE — G0439 PPPS, SUBSEQ VISIT: HCPCS | Performed by: FAMILY MEDICINE

## 2024-08-21 PROCEDURE — 1123F ACP DISCUSS/DSCN MKR DOCD: CPT | Performed by: FAMILY MEDICINE

## 2024-08-21 SDOH — HEALTH STABILITY: PHYSICAL HEALTH: ON AVERAGE, HOW MANY DAYS PER WEEK DO YOU ENGAGE IN MODERATE TO STRENUOUS EXERCISE (LIKE A BRISK WALK)?: 0 DAYS

## 2024-08-21 SDOH — HEALTH STABILITY: PHYSICAL HEALTH: ON AVERAGE, HOW MANY MINUTES DO YOU ENGAGE IN EXERCISE AT THIS LEVEL?: 10 MIN

## 2024-08-21 ASSESSMENT — LIFESTYLE VARIABLES
HOW OFTEN DO YOU HAVE SIX OR MORE DRINKS ON ONE OCCASION: 1
HOW MANY STANDARD DRINKS CONTAINING ALCOHOL DO YOU HAVE ON A TYPICAL DAY: 1 OR 2
HOW OFTEN DO YOU HAVE A DRINK CONTAINING ALCOHOL: MONTHLY OR LESS
HOW OFTEN DO YOU HAVE A DRINK CONTAINING ALCOHOL: 2
HOW MANY STANDARD DRINKS CONTAINING ALCOHOL DO YOU HAVE ON A TYPICAL DAY: 1

## 2024-08-21 ASSESSMENT — PATIENT HEALTH QUESTIONNAIRE - PHQ9
SUM OF ALL RESPONSES TO PHQ QUESTIONS 1-9: 0
SUM OF ALL RESPONSES TO PHQ QUESTIONS 1-9: 0
1. LITTLE INTEREST OR PLEASURE IN DOING THINGS: NOT AT ALL
SUM OF ALL RESPONSES TO PHQ QUESTIONS 1-9: 0
SUM OF ALL RESPONSES TO PHQ9 QUESTIONS 1 & 2: 0
SUM OF ALL RESPONSES TO PHQ QUESTIONS 1-9: 0
2. FEELING DOWN, DEPRESSED OR HOPELESS: NOT AT ALL

## 2024-08-21 NOTE — PATIENT INSTRUCTIONS
INSTRUCTIONS  NEXT APPOINTMENT: Please schedule check-up in 6 months    PLEASE TAKE THIS FORM TO CHECK-OUT WINDOW TO SCHEDULE NEXT VISIT.  Please get annual flu and covid vaccines when available in fall.  Can get at stores such as [employer] and [employer].  Read handouts.  Move a little more, decrease carb portion by 1-2 bites.  Use [employer] or [employer] zero calorie.   Look into making living will and medical POA. Info included in back of this packet. We would like a notarized copy for our records.  Please get mammogram soon to screen for breast cancer. To schedule at a [facility], please call [phone].   See GI for further evaluation of diarrhea.  RSV (Respiratory syncytial virus) causes colds but can also cause viral pneumonia which cannot be treated with antibiotics. Those at risk of more severe infection are the very young and those over 60 that have chronic medical conditions. Medicare coverage improving at this time at the pharmacy. If not covered, check again next January.   Due for tetanus booster which prevents lockjaw from injuries.  Medicare only covers for injury, so call to be seen for tetanus booster if you have any cuts, punctures or other open skin injury.   Medicare part D patients:  Get Shingrix shingles vaccine at pharmacy (such as [facility] or [facility]). Need second dose in 2-6 months. Medicare starts covering it in 2023.   Patient Education           Learning About Mild Cognitive Impairment (MCI)  What is mild cognitive impairment (MCI)?     It's common to forget things sometimes as we get older. But some older people have memory loss that's more than normal aging. It's called mild cognitive impairment, or MCI. It is not the same as dementia.  People with the condition often know that their memory or mental function has changed. Tests may show some loss. But their minds work well overall. They can carry out daily tasks that are normal for them.  People with MCI have a higher chance of one day

## 2024-08-21 NOTE — PROGRESS NOTES
Past    Smokeless tobacco: Never    Tobacco comments:     Advised not to resume   Vaping Use    Vaping status: Never Used   Substance Use Topics    Alcohol use: Yes     Comment: socially    Drug use: Never      Immunization History   Administered Date(s) Administered    COVID-19, MODERNA BLUE border, Primary or Immunocompromised, (age 12y+), IM, 100 mcg/0.5mL 01/21/2021, 02/18/2021, 01/03/2022    Influenza Virus Vaccine 10/06/2011, 11/26/2014, 10/01/2015, 10/11/2016    Influenza Whole 10/01/2015    Influenza, FLUAD, (age 65 y+), IM, Quadv, 0.5mL 09/16/2021, 10/13/2022    Influenza, FLUAD, (age 65 y+), IM, Trivalent PF, 0.5mL 10/28/2019    Influenza, FLUARIX, FLULAVAL, FLUZONE (age 6 mo+) and AFLURIA, (age 3 y+), Quadv PF, 0.5mL 10/21/2015    Influenza, FLUZONE High Dose, (age 65 y+), IM, Trivalent PF, 0.5mL 11/26/2014, 10/11/2016, 11/22/2017, 09/12/2018, 10/15/2020    Influenza, Intradermal, Preservative free 01/11/2013    Pneumococcal, PCV-13, PREVNAR 13, (age 6w+), IM, 0.5mL 05/22/2017    Pneumococcal, PPSV23, PNEUMOVAX 23, (age 2y+), SC/IM, 0.5mL 10/01/2005, 02/06/2012    TDaP, ADACEL (age 10y-64y), BOOSTRIX (age 10y+), IM, 0.5mL 04/14/2004    Tetanus 01/28/2015    Tetanus Toxoid, absorbed 01/28/2015    Zoster Live (Zostavax) 10/11/2016     LAST LABS  Lab Results   Component Value Date    CHOL 101 02/26/2024    TRIG 112 02/26/2024    HDL 42 02/26/2024    LDL 37 02/26/2024     Lab Results   Component Value Date     02/26/2024    K 4.0 02/26/2024    CREATININE 0.8 02/26/2024     Lab Results   Component Value Date    LABGLOM >60 02/26/2024    LABGLOM >60 02/16/2023    LABGLOM >60 03/01/2022    LABGLOM >60 09/16/2021    LABGLOM >60 11/16/2020     Lab Results   Component Value Date    WBC 10.1 02/26/2024    HGB 12.5 02/26/2024    HCT 38.6 02/26/2024    MCV 87.6 02/26/2024     02/26/2024     Lab Results   Component Value Date    ALT 16 02/26/2024    AST 23 02/26/2024    ALKPHOS 83 02/26/2024    BILITOT 0.5

## 2024-09-16 DIAGNOSIS — I10 ESSENTIAL HYPERTENSION: Chronic | ICD-10-CM

## 2024-09-16 RX ORDER — HYDROCHLOROTHIAZIDE 25 MG/1
25 TABLET ORAL DAILY
Qty: 90 TABLET | Refills: 1 | Status: SHIPPED | OUTPATIENT
Start: 2024-09-16

## 2024-09-27 ENCOUNTER — PATIENT MESSAGE (OUTPATIENT)
Dept: FAMILY MEDICINE CLINIC | Age: 84
End: 2024-09-27

## 2024-09-27 RX ORDER — BENZONATATE 100 MG/1
100-200 CAPSULE ORAL 3 TIMES DAILY PRN
Qty: 30 CAPSULE | Refills: 0 | Status: SHIPPED | OUTPATIENT
Start: 2024-09-27 | End: 2024-10-04

## 2024-10-25 DIAGNOSIS — I48.0 PAROXYSMAL A-FIB (HCC): ICD-10-CM

## 2024-11-17 DIAGNOSIS — I48.0 PAROXYSMAL A-FIB (HCC): ICD-10-CM

## 2024-11-17 DIAGNOSIS — I10 ESSENTIAL HYPERTENSION: Chronic | ICD-10-CM

## 2024-11-18 RX ORDER — PROPRANOLOL HYDROCHLORIDE 160 MG/1
160 CAPSULE, EXTENDED RELEASE ORAL DAILY
Qty: 90 CAPSULE | Refills: 1 | Status: SHIPPED | OUTPATIENT
Start: 2024-11-18

## 2025-01-08 DIAGNOSIS — K91.89 POSTCHOLECYSTECTOMY DIARRHEA: ICD-10-CM

## 2025-01-08 DIAGNOSIS — R19.7 POSTCHOLECYSTECTOMY DIARRHEA: ICD-10-CM

## 2025-01-08 RX ORDER — CHOLESTYRAMINE 4 G/9G
POWDER, FOR SUSPENSION ORAL
Qty: 60 EACH | Refills: 5 | Status: SHIPPED | OUTPATIENT
Start: 2025-01-08

## 2025-01-21 ENCOUNTER — OFFICE VISIT (OUTPATIENT)
Dept: FAMILY MEDICINE CLINIC | Age: 85
End: 2025-01-21

## 2025-01-21 ENCOUNTER — HOSPITAL ENCOUNTER (OUTPATIENT)
Dept: CT IMAGING | Age: 85
Discharge: HOME | End: 2025-01-21
Payer: MEDICARE

## 2025-01-21 ENCOUNTER — HOSPITAL ENCOUNTER (OUTPATIENT)
Age: 85
Discharge: HOME | End: 2025-01-21
Payer: MEDICARE

## 2025-01-21 ENCOUNTER — TELEPHONE (OUTPATIENT)
Dept: FAMILY MEDICINE CLINIC | Age: 85
End: 2025-01-21

## 2025-01-21 VITALS
TEMPERATURE: 98.2 F | BODY MASS INDEX: 39.75 KG/M2 | SYSTOLIC BLOOD PRESSURE: 134 MMHG | DIASTOLIC BLOOD PRESSURE: 80 MMHG | HEART RATE: 76 BPM | OXYGEN SATURATION: 97 % | RESPIRATION RATE: 12 BRPM | WEIGHT: 223 LBS

## 2025-01-21 DIAGNOSIS — H92.01 POSTERIOR AURICULAR PAIN OF RIGHT EAR: ICD-10-CM

## 2025-01-21 DIAGNOSIS — R51.9 HEADACHE, WORSENING: ICD-10-CM

## 2025-01-21 DIAGNOSIS — R51.9 HEADACHE, WORSENING: Primary | ICD-10-CM

## 2025-01-21 DIAGNOSIS — H92.01 PAIN OF RIGHT MASTOID: Primary | ICD-10-CM

## 2025-01-21 DIAGNOSIS — H92.01 PAIN OF RIGHT MASTOID: ICD-10-CM

## 2025-01-21 LAB
ALBUMIN SERPL-MCNC: 3.7 G/DL (ref 3.4–5)
ALBUMIN/GLOB SERPL: 1.2 {RATIO} (ref 1.1–2.2)
ALP SERPL-CCNC: 84 U/L (ref 40–129)
ALT SERPL-CCNC: 19 U/L (ref 10–40)
ANION GAP SERPL CALCULATED.3IONS-SCNC: 11 MMOL/L (ref 3–16)
AST SERPL-CCNC: 21 U/L (ref 15–37)
BASOPHILS # BLD: 0 K/UL (ref 0–0.2)
BASOPHILS NFR BLD: 0.3 %
BILIRUB SERPL-MCNC: 0.4 MG/DL (ref 0–1)
BUN SERPL-MCNC: 13 MG/DL (ref 7–20)
CALCIUM SERPL-MCNC: 9.5 MG/DL (ref 8.3–10.6)
CHLORIDE SERPL-SCNC: 103 MMOL/L (ref 99–110)
CO2 SERPL-SCNC: 29 MMOL/L (ref 21–32)
CREAT SERPL-MCNC: 0.7 MG/DL (ref 0.6–1.2)
DEPRECATED RDW RBC AUTO: 14.6 % (ref 12.4–15.4)
EOSINOPHIL # BLD: 0.3 K/UL (ref 0–0.6)
EOSINOPHIL NFR BLD: 2.4 %
GFR SERPLBLD CREATININE-BSD FMLA CKD-EPI: 85 ML/MIN/{1.73_M2}
GLUCOSE SERPL-MCNC: 115 MG/DL (ref 70–99)
HCT VFR BLD AUTO: 35.7 % (ref 36–48)
HGB BLD-MCNC: 11.5 G/DL (ref 12–16)
LYMPHOCYTES # BLD: 2.2 K/UL (ref 1–5.1)
LYMPHOCYTES NFR BLD: 21.5 %
MCH RBC QN AUTO: 28.2 PG (ref 26–34)
MCHC RBC AUTO-ENTMCNC: 32.3 G/DL (ref 31–36)
MCV RBC AUTO: 87.2 FL (ref 80–100)
MONOCYTES # BLD: 0.7 K/UL (ref 0–1.3)
MONOCYTES NFR BLD: 7.1 %
NEUTROPHILS # BLD: 7.1 K/UL (ref 1.7–7.7)
NEUTROPHILS NFR BLD: 68.7 %
PERFORMED ON: NORMAL
PLATELET # BLD AUTO: 190 K/UL (ref 135–450)
PMV BLD AUTO: 8.6 FL (ref 5–10.5)
POC CREATININE: 0.8 MG/DL (ref 0.6–1.2)
POC SAMPLE TYPE: NORMAL
POTASSIUM SERPL-SCNC: 4.3 MMOL/L (ref 3.5–5.1)
PROT SERPL-MCNC: 6.9 G/DL (ref 6.4–8.2)
RBC # BLD AUTO: 4.09 M/UL (ref 4–5.2)
SODIUM SERPL-SCNC: 143 MMOL/L (ref 136–145)
WBC # BLD AUTO: 10.3 K/UL (ref 4–11)

## 2025-01-21 PROCEDURE — 80053 COMPREHEN METABOLIC PANEL: CPT

## 2025-01-21 PROCEDURE — 82565 ASSAY OF CREATININE: CPT

## 2025-01-21 PROCEDURE — 85025 COMPLETE CBC W/AUTO DIFF WBC: CPT

## 2025-01-21 PROCEDURE — 6360000004 HC RX CONTRAST MEDICATION

## 2025-01-21 PROCEDURE — 36415 COLL VENOUS BLD VENIPUNCTURE: CPT

## 2025-01-21 PROCEDURE — 70470 CT HEAD/BRAIN W/O & W/DYE: CPT

## 2025-01-21 RX ORDER — IOPAMIDOL 755 MG/ML
75 INJECTION, SOLUTION INTRAVASCULAR
Status: COMPLETED | OUTPATIENT
Start: 2025-01-21 | End: 2025-01-21

## 2025-01-21 RX ADMIN — IOPAMIDOL 75 ML: 755 INJECTION, SOLUTION INTRAVENOUS at 16:07

## 2025-01-21 SDOH — ECONOMIC STABILITY: FOOD INSECURITY: WITHIN THE PAST 12 MONTHS, THE FOOD YOU BOUGHT JUST DIDN'T LAST AND YOU DIDN'T HAVE MONEY TO GET MORE.: NEVER TRUE

## 2025-01-21 SDOH — ECONOMIC STABILITY: FOOD INSECURITY: WITHIN THE PAST 12 MONTHS, YOU WORRIED THAT YOUR FOOD WOULD RUN OUT BEFORE YOU GOT MONEY TO BUY MORE.: NEVER TRUE

## 2025-01-21 ASSESSMENT — ENCOUNTER SYMPTOMS
SORE THROAT: 0
ABDOMINAL PAIN: 0
SINUS PRESSURE: 0
RHINORRHEA: 0
SHORTNESS OF BREATH: 0
FACIAL SWELLING: 0
SINUS PAIN: 0

## 2025-01-21 ASSESSMENT — PATIENT HEALTH QUESTIONNAIRE - PHQ9
1. LITTLE INTEREST OR PLEASURE IN DOING THINGS: NOT AT ALL
SUM OF ALL RESPONSES TO PHQ QUESTIONS 1-9: 0
2. FEELING DOWN, DEPRESSED OR HOPELESS: NOT AT ALL
SUM OF ALL RESPONSES TO PHQ9 QUESTIONS 1 & 2: 0
SUM OF ALL RESPONSES TO PHQ QUESTIONS 1-9: 0

## 2025-01-21 NOTE — PROGRESS NOTES
1/21/2025    This is a 84 y.o. female   Chief Complaint   Patient presents with    Ear Pain     Rt ear pain. Pain is severe and has been off and on for 2 weeks. Now having bad headache on Rt side with ear pain and bones around ear are sore to the touch   .    HPI    Having right sided headache and ear pain, especially behind her right ear for 2 weeks  Significant tenderness behind the ear  Hurts to turn her head to the right  Has had minor sinus drainage  No weakness, vision changes, slurred speech  Pain is waking her at night  Taking tylenol for pain which has been somewhat helpful    Taking tizanidine for muscle pain- this has not helped with the pain     Patient Active Problem List   Diagnosis    GERD (gastroesophageal reflux disease)    Urge incontinence    Hyperlipidemia    Chronic back pain    Allergic rhinitis    Edema    Generalized osteoarthritis    Vertigo    Tinnitus    Rotator cuff tear- right    Benign essential tremor    Paroxysmal A-fib (HCC)    Essential hypertension    Asthmatic bronchitis    Prediabetes    History of basal cell carcinoma (BCC)    Chronic coronary artery disease    History of total left hip replacement    History of knee replacement, total, right    Family history of breast cancer in sister    Chronic obstructive pulmonary disease, unspecified COPD type (HCC)    Chronic anticoagulation    Postcholecystectomy diarrhea    Chronic diarrhea    Incontinence of feces       Current Outpatient Medications   Medication Sig Dispense Refill    amoxicillin-clavulanate (AUGMENTIN) 875-125 MG per tablet Take 1 tablet by mouth 2 times daily for 10 days 20 tablet 0    cholestyramine (QUESTRAN) 4 g packet MIX AND DRINK 1 PACKET BY MOUTH TWICE DAILY 60 each 5    propranolol (INDERAL LA) 160 MG extended release capsule TAKE 1 CAPSULE BY MOUTH DAILY 90 capsule 1    rivaroxaban (XARELTO) 20 MG TABS tablet TAKE 1 TABLET BY MOUTH DAILY WITH BREAKFAST 90 tablet 1    hydroCHLOROthiazide (HYDRODIURIL) 25 MG

## 2025-01-22 ENCOUNTER — TELEPHONE (OUTPATIENT)
Dept: FAMILY MEDICINE CLINIC | Age: 85
End: 2025-01-22

## 2025-01-22 DIAGNOSIS — H92.01 PAIN OF RIGHT MASTOID: Primary | ICD-10-CM

## 2025-01-22 RX ORDER — TRAMADOL HYDROCHLORIDE 50 MG/1
50 TABLET ORAL EVERY 6 HOURS PRN
Qty: 30 TABLET | Refills: 0 | Status: SHIPPED | OUTPATIENT
Start: 2025-01-22 | End: 2025-02-05

## 2025-01-22 NOTE — TELEPHONE ENCOUNTER
Yes. It is OK to stop tizanidine muscle relaxer if no longer needed.    For pain, take OTC tylenol arthritis (acetaminophen 8 hour) 2 tabs three times per day every day until pain resolved.    May take tramadol in addition to tylenol if needed. Rx sent in.

## 2025-01-22 NOTE — TELEPHONE ENCOUNTER
Patient was called and given info on ENT.  Patient calls back informing office she is unable to be seen until 2/22/25 with .  Advised the patient to call back to see if anyone else in office could see her sooner.  Patient also advised to check with office on a regular to see if anyone cancels.    Patient also asking if she may d/c Zanaflex.  She would like also to ask for something for pain. She requests Tramadol.  Pharmacy [facility] on [facility]

## 2025-01-22 NOTE — TELEPHONE ENCOUNTER
The patient has been notified of this information and all questions answered.   Pt is scheduled to see Dr. Harris 2-19-25 and is wanting a sooner appt if possible, will call office and see if this is possible.

## 2025-01-24 NOTE — TELEPHONE ENCOUNTER
Patient given results of CT scan showing right mastoid effusion.  Advised to complete course of augmentin and referral to ENT placed.

## 2025-01-31 DIAGNOSIS — H92.01 PAIN OF RIGHT MASTOID: ICD-10-CM

## 2025-01-31 DIAGNOSIS — R51.9 HEADACHE, WORSENING: ICD-10-CM

## 2025-01-31 DIAGNOSIS — H92.01 POSTERIOR AURICULAR PAIN OF RIGHT EAR: ICD-10-CM

## 2025-02-03 ENCOUNTER — PATIENT MESSAGE (OUTPATIENT)
Dept: FAMILY MEDICINE CLINIC | Age: 85
End: 2025-02-03

## 2025-02-19 ENCOUNTER — OFFICE VISIT (OUTPATIENT)
Dept: ENT CLINIC | Age: 85
End: 2025-02-19
Payer: MEDICARE

## 2025-02-19 VITALS
OXYGEN SATURATION: 96 % | TEMPERATURE: 97.3 F | SYSTOLIC BLOOD PRESSURE: 122 MMHG | BODY MASS INDEX: 39.34 KG/M2 | DIASTOLIC BLOOD PRESSURE: 79 MMHG | WEIGHT: 222 LBS | HEIGHT: 63 IN | HEART RATE: 85 BPM

## 2025-02-19 DIAGNOSIS — R05.3 CHRONIC COUGH: Primary | ICD-10-CM

## 2025-02-19 DIAGNOSIS — R51.9 ACUTE NONINTRACTABLE HEADACHE, UNSPECIFIED HEADACHE TYPE: ICD-10-CM

## 2025-02-19 PROCEDURE — 1123F ACP DISCUSS/DSCN MKR DOCD: CPT | Performed by: OTOLARYNGOLOGY

## 2025-02-19 PROCEDURE — 3078F DIAST BP <80 MM HG: CPT | Performed by: OTOLARYNGOLOGY

## 2025-02-19 PROCEDURE — 1159F MED LIST DOCD IN RCRD: CPT | Performed by: OTOLARYNGOLOGY

## 2025-02-19 PROCEDURE — 99203 OFFICE O/P NEW LOW 30 MIN: CPT | Performed by: OTOLARYNGOLOGY

## 2025-02-19 PROCEDURE — 3074F SYST BP LT 130 MM HG: CPT | Performed by: OTOLARYNGOLOGY

## 2025-02-19 RX ORDER — FLUTICASONE PROPIONATE 50 MCG
1 SPRAY, SUSPENSION (ML) NASAL DAILY
Qty: 32 G | Refills: 1 | Status: SHIPPED | OUTPATIENT
Start: 2025-02-19

## 2025-02-19 NOTE — PROGRESS NOTES
[facility]  DIVISION OF OTOLARYNGOLOGY- HEAD & NECK SURGERY  CONSULT      Patient Name: Erinn Solitario  Medical Record Number:  [id number]  Primary Care Physician:  Zara Rowell MD  Date of Consultation: 2/19/2025    Chief Complaint: Headache        HISTORY OF PRESENT ILLNESS  Erinn is a(n) 84 y.o. female who presents for evaluation of what sounds like a headache.  She was having what she described as a stiff neck and inability to move her neck.  She said that she had a headache that seem to come from behind her ear and radiate around the entire right side of her head.  She had a CT scan that showed maybe a little bit of fluid in the mastoid.  She was treated the antibiotics.  She actually says she is doing better at this time.    She did have complaint of a chronic cough.  She is already on reflux medication.  She stopped smoking 14 years ago when she had a bad lung infection.  She does sometimes feel like she has some allergy symptoms.  Does not have a lot of anterior rhinorrhea.    Subjectively she does noticed maybe a little worsening hearing on the right compared to the left            REVIEW OF SYSTEMS  As above    PHYSICAL EXAM  GENERAL: No Acute Distress, Alert and Oriented, no Hoarseness, strong voice  EYES: EOMI, Anti-icteric  HENT:   Head: Normocephalic and atraumatic.   Face:  Symmetric, facial nerve intact, no sinus tenderness  Right Ear: Normal external ear, normal external auditory canal, intact tympanic membrane with normal mobility and aerated middle ear  Left Ear: Normal external ear, normal external auditory canal, intact tympanic membrane with normal mobility and aerated middle ear  Mouth/Oral Cavity:  normal lips, Uvula is midline, no mucosal lesions, no trismus  Oropharynx/Larynx:  normal oropharynx  Nose:Normal external nasal appearance.  A  NECK: Normal range of motion, no thyromegaly, trachea is midline, no lymphadenopathy, no neck masses, no crepitus      RADIOLOGY  Summary of

## 2025-02-26 ENCOUNTER — OFFICE VISIT (OUTPATIENT)
Dept: FAMILY MEDICINE CLINIC | Age: 85
End: 2025-02-26

## 2025-02-26 VITALS
WEIGHT: 221 LBS | SYSTOLIC BLOOD PRESSURE: 134 MMHG | HEART RATE: 90 BPM | DIASTOLIC BLOOD PRESSURE: 80 MMHG | BODY MASS INDEX: 39.16 KG/M2 | OXYGEN SATURATION: 94 % | HEIGHT: 63 IN

## 2025-02-26 DIAGNOSIS — I48.0 PAROXYSMAL A-FIB (HCC): ICD-10-CM

## 2025-02-26 DIAGNOSIS — E78.2 MIXED HYPERLIPIDEMIA: ICD-10-CM

## 2025-02-26 DIAGNOSIS — I10 ESSENTIAL HYPERTENSION: Primary | ICD-10-CM

## 2025-02-26 DIAGNOSIS — E66.01 MORBID (SEVERE) OBESITY DUE TO EXCESS CALORIES: ICD-10-CM

## 2025-02-26 DIAGNOSIS — R73.03 PREDIABETES: ICD-10-CM

## 2025-02-26 DIAGNOSIS — J44.9 CHRONIC OBSTRUCTIVE PULMONARY DISEASE, UNSPECIFIED COPD TYPE (HCC): ICD-10-CM

## 2025-02-26 DIAGNOSIS — I25.10 CHRONIC CORONARY ARTERY DISEASE: ICD-10-CM

## 2025-02-26 DIAGNOSIS — D64.9 ANEMIA, UNSPECIFIED TYPE: ICD-10-CM

## 2025-02-26 DIAGNOSIS — Z12.31 OTHER SCREENING MAMMOGRAM: ICD-10-CM

## 2025-02-26 PROBLEM — K52.9 CHRONIC DIARRHEA: Status: RESOLVED | Noted: 2024-02-21 | Resolved: 2025-02-26

## 2025-02-26 LAB
BASOPHILS # BLD: 0.1 K/UL (ref 0–0.2)
BASOPHILS NFR BLD: 0.6 %
CHOLEST SERPL-MCNC: 109 MG/DL (ref 0–199)
DEPRECATED RDW RBC AUTO: 14.9 % (ref 12.4–15.4)
EOSINOPHIL # BLD: 0.2 K/UL (ref 0–0.6)
EOSINOPHIL NFR BLD: 2.6 %
EST. AVERAGE GLUCOSE BLD GHB EST-MCNC: 119.8 MG/DL
FERRITIN SERPL IA-MCNC: 62.2 NG/ML (ref 15–150)
HBA1C MFR BLD: 5.8 %
HCT VFR BLD AUTO: 38.9 % (ref 36–48)
HDLC SERPL-MCNC: 37 MG/DL (ref 40–60)
HGB BLD-MCNC: 12.5 G/DL (ref 12–16)
IRON SATN MFR SERPL: 19 % (ref 15–50)
IRON SERPL-MCNC: 52 UG/DL (ref 37–145)
LDLC SERPL CALC-MCNC: 45 MG/DL
LYMPHOCYTES # BLD: 2 K/UL (ref 1–5.1)
LYMPHOCYTES NFR BLD: 22.5 %
MCH RBC QN AUTO: 28.7 PG (ref 26–34)
MCHC RBC AUTO-ENTMCNC: 32.1 G/DL (ref 31–36)
MCV RBC AUTO: 89.3 FL (ref 80–100)
MONOCYTES # BLD: 0.6 K/UL (ref 0–1.3)
MONOCYTES NFR BLD: 6.3 %
NEUTROPHILS # BLD: 6.2 K/UL (ref 1.7–7.7)
NEUTROPHILS NFR BLD: 68 %
PLATELET # BLD AUTO: 217 K/UL (ref 135–450)
PMV BLD AUTO: 9.7 FL (ref 5–10.5)
RBC # BLD AUTO: 4.36 M/UL (ref 4–5.2)
TIBC SERPL-MCNC: 277 UG/DL (ref 260–445)
TRIGL SERPL-MCNC: 134 MG/DL (ref 0–150)
VLDLC SERPL CALC-MCNC: 27 MG/DL
WBC # BLD AUTO: 9.1 K/UL (ref 4–11)

## 2025-02-26 ASSESSMENT — PATIENT HEALTH QUESTIONNAIRE - PHQ9
SUM OF ALL RESPONSES TO PHQ9 QUESTIONS 1 & 2: 0
SUM OF ALL RESPONSES TO PHQ QUESTIONS 1-9: 0
1. LITTLE INTEREST OR PLEASURE IN DOING THINGS: NOT AT ALL
2. FEELING DOWN, DEPRESSED OR HOPELESS: NOT AT ALL
SUM OF ALL RESPONSES TO PHQ QUESTIONS 1-9: 0

## 2025-02-26 NOTE — PATIENT INSTRUCTIONS
INSTRUCTIONS  NEXT APPOINTMENT: Please schedule annual complete physical (30 minutes) in 6 months   PLEASE TAKE THIS FORM TO CHECK-OUT WINDOW TO SCHEDULE NEXT VISIT.   PLEASE GET BLOODWORK DRAWN TODAY ON FIRST FLOOR in 170.  Take orders with you.  RESULTS- most blood tests back in couple days.  We will call you if any problems.  If bloodwork good, you will get letter in mail or notified thru MyChart (if signed up) within 2 weeks.  If you do not, please call office.   Please get annual flu and covid vaccine when available in fall.  Can get at stores such as [employer].  Due for mammogram in May.    Patient Education   TIPS ON WEIGHT LOSS    Weight loss maintenance is considered successful if you lose at least 10 percent of your body weight and keep that weight off for at least one year.    Ideas for better weight control.  Try implementing one a week.  Drink only sugar free beverages.  Drink at least 8 cups per day.  Do not eat after 7 PM.  Snack every 2 hours during the day on 100 calorie snacks (apple, strawberry, almonds, pistachio, walnuts, cheese cubes, raw veggies like bell peppers, tomato, celery, zucchini, snow peas, broccoli).  At meals, limit portion sizes to what you could hold in your hand of a meat.  Eat all of the raw vegetables and salads that you want with vinegar or low kevin dressing.  Sleep 8 hours at night.  Minimize white starches- bread, pasta, rice potatoes. Try high fiber cereal, breads, granola.  Move more- try walking 15 minutes per day.  Use small plates and bowls to make serving look bigger.  Keeping track of calories and fat grams.  Try cell phone abner called \"Lose-it\"  Planning your meals ahead of time  Eating breakfast every day  Keeping your diet steady.  Eating the same on weekends,vacations and special occasions.  Watching less than 10 hours of television per week    Should I take weight loss medicines?   Taking medicines may work better than diet and exercise alone for some

## 2025-02-26 NOTE — PROGRESS NOTES
packs/day for 20.0 years (5.0 ttl pk-yrs)     Types: Cigarettes     Start date: 9/1/1994     Quit date: 9/1/2014     Years since quitting: 10.4     Passive exposure: Past    Smokeless tobacco: Never    Tobacco comments:     Advised not to resume   Vaping Use    Vaping status: Never Used   Substance Use Topics    Alcohol use: Yes     Comment: socially    Drug use: Never     MEDS  Current Outpatient Medications   Medication Instructions    albuterol sulfate  (90 Base) MCG/ACT inhaler INHALE 2 PUFFS INTO THE LUNGS EVERY 4 HOURS AS NEEDED FOR WHEEZING    atorvastatin (LIPITOR) 10 MG tablet TK 1 T PO D    Biotin 3 MG TABS 1 capsule, Oral, DAILY    cholestyramine (QUESTRAN) 4 g packet MIX AND DRINK 1 PACKET BY MOUTH TWICE DAILY    fluticasone (FLONASE) 50 MCG/ACT nasal spray 1 spray, Each Nostril, DAILY    hydroCHLOROthiazide (HYDRODIURIL) 25 mg, Oral, DAILY    meclizine (ANTIVERT) 12.5 MG tablet TAKE 1/2 TO 1 TABLET BY MOUTH THREE TIMES DAILY AS NEEDED FOR DIZZINESS OR NAUSEA    omeprazole (PRILOSEC) 20 mg, Oral, 2 TIMES DAILY    propranolol (INDERAL LA) 160 mg, Oral, DAILY    rivaroxaban (XARELTO) 20 MG TABS tablet TAKE 1 TABLET BY MOUTH DAILY WITH BREAKFAST    tiZANidine (ZANAFLEX) 4 MG tablet TAKE 1 TABLET BY MOUTH EVERY EVENING AS NEEDED     LAST LABS  Lab Results   Component Value Date    CHOL 101 02/26/2024    TRIG 112 02/26/2024    HDL 42 02/26/2024    LDL 37 02/26/2024     Lab Results   Component Value Date    ALT 19 01/21/2025    AST 21 01/21/2025    ALKPHOS 84 01/21/2025    BILITOT 0.4 01/21/2025     Lab Results   Component Value Date     01/21/2025    K 4.3 01/21/2025    CREATININE 0.7 01/21/2025     Lab Results   Component Value Date    LABGLOM 85 01/21/2025    LABGLOM 73 01/21/2025    LABGLOM >60 02/26/2024     Estimated Creatinine Clearance: 68 mL/min (based on SCr of 0.7 mg/dL).  Lab Results   Component Value Date    WBC 10.3 01/21/2025    HGB 11.5 (L) 01/21/2025     01/21/2025     TSH

## 2025-03-02 DIAGNOSIS — E78.2 MIXED HYPERLIPIDEMIA: ICD-10-CM

## 2025-03-03 RX ORDER — ATORVASTATIN CALCIUM 10 MG/1
TABLET, FILM COATED ORAL
Qty: 90 TABLET | Refills: 3 | Status: SHIPPED | OUTPATIENT
Start: 2025-03-03

## 2025-03-10 DIAGNOSIS — I10 ESSENTIAL HYPERTENSION: Chronic | ICD-10-CM

## 2025-03-10 RX ORDER — HYDROCHLOROTHIAZIDE 25 MG/1
25 TABLET ORAL DAILY
Qty: 90 TABLET | Refills: 1 | Status: SHIPPED | OUTPATIENT
Start: 2025-03-10

## 2025-04-26 DIAGNOSIS — I48.0 PAROXYSMAL A-FIB (HCC): ICD-10-CM

## 2025-04-28 RX ORDER — RIVAROXABAN 20 MG/1
20 TABLET, FILM COATED ORAL DAILY
Qty: 90 TABLET | Refills: 1 | Status: SHIPPED | OUTPATIENT
Start: 2025-04-28

## 2025-05-15 DIAGNOSIS — I10 ESSENTIAL HYPERTENSION: Chronic | ICD-10-CM

## 2025-05-15 DIAGNOSIS — I48.0 PAROXYSMAL A-FIB (HCC): ICD-10-CM

## 2025-05-15 RX ORDER — PROPRANOLOL HYDROCHLORIDE 160 MG/1
160 CAPSULE, EXTENDED RELEASE ORAL DAILY
Qty: 90 CAPSULE | Refills: 1 | Status: SHIPPED | OUTPATIENT
Start: 2025-05-15

## 2025-08-19 DIAGNOSIS — I10 ESSENTIAL HYPERTENSION: Chronic | ICD-10-CM

## 2025-08-20 RX ORDER — HYDROCHLOROTHIAZIDE 25 MG/1
25 TABLET ORAL DAILY
Qty: 90 TABLET | Refills: 1 | Status: SHIPPED | OUTPATIENT
Start: 2025-08-20

## (undated) DEVICE — APPLIER CLP M L L11.4IN DIA10MM ENDOSCP ROT MULT FOR LIG

## (undated) DEVICE — SYRINGE 20ML LL S/C 50

## (undated) DEVICE — ADTEC SINGLE USE HOOK SCISSORS, SHAFT ONLY, MONOPOLAR, STRAIGHT, WORKING LENGTH: 12 1/4", (310 MM), DIAM. 5 MM, BLUNT/BLUNT, INSULATED, SINGLE ACTION, STERILE, DISPOSABLE, PACKAGE OF 10 PIECES: Brand: AESCULAP

## (undated) DEVICE — GENERAL LAPAROSCOPY: Brand: MEDLINE INDUSTRIES, INC.

## (undated) DEVICE — ADHESIVE SKIN CLOSURE TOP 36 CC HI VISC DERMBND MINI

## (undated) DEVICE — TROCAR: Brand: KII SHIELDED BLADED ACCESS SYSTEM

## (undated) DEVICE — SET INSUF TUBE HEAT ISO CONN DISP

## (undated) DEVICE — GARMENT COMPR STD FOR 17IN CALF UNIF THER FLOTRN

## (undated) DEVICE — SYRINGE MED 30ML STD CLR PLAS LUERLOCK TIP N CTRL DISP

## (undated) DEVICE — SOLUTION IV IRRIG POUR BRL 0.9% SODIUM CHL 2F7124

## (undated) DEVICE — Device

## (undated) DEVICE — GOWN,AURORA,NONREINF,RAGLAN,XXL,STERILE: Brand: MEDLINE

## (undated) DEVICE — DBD-DRAPE,LAP,CHOLE,W/TROUGHS,STERILE: Brand: MEDLINE

## (undated) DEVICE — GLOVE SURG SZ 8 L12IN FNGR THK79MIL GRN LTX FREE

## (undated) DEVICE — GLOVE ORANGE PI 7 1/2   MSG9075

## (undated) DEVICE — INSUFFLATION NEEDLE TO ESTABLISH PNEUMOPERITONEUM.: Brand: INSUFFLATION NEEDLE

## (undated) DEVICE — SUTURE VCRL SZ 4-0 L18IN ABSRB UD L19MM PS-2 3/8 CIR PRIM J496H

## (undated) DEVICE — TROCAR: Brand: KII SLEEVE

## (undated) DEVICE — SUTURE SZ 0 27IN 5/8 CIR UR-6  TAPER PT VIOLET ABSRB VICRYL J603H

## (undated) DEVICE — COVER LT HNDL BLU PLAS